# Patient Record
Sex: MALE | Race: WHITE | NOT HISPANIC OR LATINO
[De-identification: names, ages, dates, MRNs, and addresses within clinical notes are randomized per-mention and may not be internally consistent; named-entity substitution may affect disease eponyms.]

---

## 2021-09-08 ENCOUNTER — APPOINTMENT (OUTPATIENT)
Dept: INTERNAL MEDICINE | Facility: CLINIC | Age: 81
End: 2021-09-08
Payer: MEDICARE

## 2021-09-08 VITALS — HEIGHT: 67 IN | WEIGHT: 190 LBS | BODY MASS INDEX: 29.82 KG/M2

## 2021-09-08 VITALS — SYSTOLIC BLOOD PRESSURE: 122 MMHG | RESPIRATION RATE: 14 BRPM | HEART RATE: 72 BPM | DIASTOLIC BLOOD PRESSURE: 76 MMHG

## 2021-09-08 DIAGNOSIS — F32.9 ANXIETY DISORDER, UNSPECIFIED: ICD-10-CM

## 2021-09-08 DIAGNOSIS — Z87.820 PERSONAL HISTORY OF TRAUMATIC BRAIN INJURY: ICD-10-CM

## 2021-09-08 DIAGNOSIS — Z98.61 CORONARY ANGIOPLASTY STATUS: ICD-10-CM

## 2021-09-08 DIAGNOSIS — I25.2 OLD MYOCARDIAL INFARCTION: ICD-10-CM

## 2021-09-08 DIAGNOSIS — K59.09 OTHER CONSTIPATION: ICD-10-CM

## 2021-09-08 DIAGNOSIS — F41.9 ANXIETY DISORDER, UNSPECIFIED: ICD-10-CM

## 2021-09-08 PROBLEM — Z00.00 ENCOUNTER FOR PREVENTIVE HEALTH EXAMINATION: Status: ACTIVE | Noted: 2021-09-08

## 2021-09-08 PROCEDURE — 99204 OFFICE O/P NEW MOD 45 MIN: CPT | Mod: 25

## 2021-09-08 PROCEDURE — 36415 COLL VENOUS BLD VENIPUNCTURE: CPT

## 2021-09-08 RX ORDER — ASPIRIN ENTERIC COATED TABLETS 81 MG 81 MG/1
81 TABLET, DELAYED RELEASE ORAL DAILY
Qty: 30 | Refills: 2 | Status: ACTIVE | COMMUNITY
Start: 2021-09-08

## 2021-09-10 LAB
25(OH)D3 SERPL-MCNC: 26.3 NG/ML
ALBUMIN SERPL ELPH-MCNC: 4.4 G/DL
ALP BLD-CCNC: 79 U/L
ALT SERPL-CCNC: 12 U/L
ANION GAP SERPL CALC-SCNC: 13 MMOL/L
APPEARANCE: CLEAR
AST SERPL-CCNC: 17 U/L
BASOPHILS # BLD AUTO: 0.03 K/UL
BASOPHILS NFR BLD AUTO: 0.7 %
BILIRUB SERPL-MCNC: 0.8 MG/DL
BILIRUBIN URINE: NEGATIVE
BLOOD URINE: NEGATIVE
BUN SERPL-MCNC: 23 MG/DL
CALCIUM SERPL-MCNC: 9.3 MG/DL
CHLORIDE SERPL-SCNC: 106 MMOL/L
CHOLEST SERPL-MCNC: 168 MG/DL
CO2 SERPL-SCNC: 24 MMOL/L
COLOR: YELLOW
COVID-19 SPIKE DOMAIN ANTIBODY INTERPRETATION: POSITIVE
CREAT SERPL-MCNC: 0.93 MG/DL
CREAT SPEC-SCNC: 164 MG/DL
EOSINOPHIL # BLD AUTO: 0.2 K/UL
EOSINOPHIL NFR BLD AUTO: 4.4 %
ESTIMATED AVERAGE GLUCOSE: 100 MG/DL
FOLATE SERPL-MCNC: 8.6 NG/ML
GLUCOSE QUALITATIVE U: NEGATIVE
GLUCOSE SERPL-MCNC: 100 MG/DL
HBA1C MFR BLD HPLC: 5.1 %
HCT VFR BLD CALC: 44 %
HDLC SERPL-MCNC: 41 MG/DL
HGB BLD-MCNC: 15.5 G/DL
IMM GRANULOCYTES NFR BLD AUTO: 0.2 %
KETONES URINE: NEGATIVE
LDLC SERPL CALC-MCNC: 107 MG/DL
LEUKOCYTE ESTERASE URINE: NEGATIVE
LYMPHOCYTES # BLD AUTO: 1.37 K/UL
LYMPHOCYTES NFR BLD AUTO: 29.8 %
MAGNESIUM SERPL-MCNC: 2.1 MG/DL
MAN DIFF?: NORMAL
MCHC RBC-ENTMCNC: 32.4 PG
MCHC RBC-ENTMCNC: 35.2 GM/DL
MCV RBC AUTO: 91.9 FL
MICROALBUMIN 24H UR DL<=1MG/L-MCNC: <1.2 MG/DL
MICROALBUMIN/CREAT 24H UR-RTO: NORMAL MG/G
MONOCYTES # BLD AUTO: 0.42 K/UL
MONOCYTES NFR BLD AUTO: 9.2 %
NEUTROPHILS # BLD AUTO: 2.56 K/UL
NEUTROPHILS NFR BLD AUTO: 55.7 %
NITRITE URINE: NEGATIVE
NONHDLC SERPL-MCNC: 128 MG/DL
PH URINE: 6
PLATELET # BLD AUTO: 177 K/UL
POTASSIUM SERPL-SCNC: 4.6 MMOL/L
PROT SERPL-MCNC: 6.8 G/DL
PROTEIN URINE: NORMAL
PSA SERPL-MCNC: 1.05 NG/ML
RBC # BLD: 4.79 M/UL
RBC # FLD: 12.4 %
SARS-COV-2 AB SERPL IA-ACNC: 97.2 U/ML
SODIUM SERPL-SCNC: 143 MMOL/L
SPECIFIC GRAVITY URINE: >=1.03
T4 FREE SERPL-MCNC: 1.1 NG/DL
T4 SERPL-MCNC: 6.7 UG/DL
TRIGL SERPL-MCNC: 104 MG/DL
TSH SERPL-ACNC: 2.47 UIU/ML
URATE SERPL-MCNC: 4.6 MG/DL
UROBILINOGEN URINE: NORMAL
VIT B12 SERPL-MCNC: 250 PG/ML
WBC # FLD AUTO: 4.59 K/UL

## 2021-09-12 NOTE — ASSESSMENT
[FreeTextEntry1] : Blood work was drawn and sent to the lab today. The patient has been instructed to call the office next week to discuss today's lab work.\par \par Obtain old records\par \par Begin Lexapro 5 mg daily\par Consider therapist / psychologist\par \par Celebrex PRN for shoulder pain\par Orthopedic evaluation\par \par Consider Neurology evaluation for neuropathy / ?early cognitive issues\par \par Cardiology evaluation due\par \par Dermatology eval due\par \par f/u one month
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
## 2021-09-13 ENCOUNTER — APPOINTMENT (OUTPATIENT)
Dept: INTERNAL MEDICINE | Facility: CLINIC | Age: 81
End: 2021-09-13

## 2021-09-14 ENCOUNTER — LABORATORY RESULT (OUTPATIENT)
Age: 81
End: 2021-09-14

## 2021-09-29 ENCOUNTER — APPOINTMENT (OUTPATIENT)
Dept: INTERNAL MEDICINE | Facility: CLINIC | Age: 81
End: 2021-09-29
Payer: MEDICARE

## 2021-09-29 ENCOUNTER — APPOINTMENT (OUTPATIENT)
Dept: ORTHOPEDIC SURGERY | Facility: CLINIC | Age: 81
End: 2021-09-29
Payer: MEDICARE

## 2021-09-29 VITALS
BODY MASS INDEX: 29.82 KG/M2 | DIASTOLIC BLOOD PRESSURE: 80 MMHG | SYSTOLIC BLOOD PRESSURE: 140 MMHG | WEIGHT: 190 LBS | HEIGHT: 67 IN | HEART RATE: 66 BPM | RESPIRATION RATE: 14 BRPM

## 2021-09-29 VITALS
BODY MASS INDEX: 29.19 KG/M2 | DIASTOLIC BLOOD PRESSURE: 79 MMHG | HEART RATE: 60 BPM | SYSTOLIC BLOOD PRESSURE: 138 MMHG | WEIGHT: 186 LBS | HEIGHT: 67 IN

## 2021-09-29 DIAGNOSIS — Z23 ENCOUNTER FOR IMMUNIZATION: ICD-10-CM

## 2021-09-29 PROCEDURE — 73030 X-RAY EXAM OF SHOULDER: CPT | Mod: 50

## 2021-09-29 PROCEDURE — 99214 OFFICE O/P EST MOD 30 MIN: CPT | Mod: 25

## 2021-09-29 PROCEDURE — G0008: CPT

## 2021-09-29 PROCEDURE — 90662 IIV NO PRSV INCREASED AG IM: CPT

## 2021-09-29 PROCEDURE — 99203 OFFICE O/P NEW LOW 30 MIN: CPT

## 2021-09-29 RX ORDER — ESCITALOPRAM OXALATE 5 MG/1
5 TABLET ORAL DAILY
Qty: 30 | Refills: 1 | Status: DISCONTINUED | COMMUNITY
Start: 2021-09-08 | End: 2021-09-29

## 2021-09-29 RX ORDER — CELECOXIB 100 MG/1
100 CAPSULE ORAL
Qty: 60 | Refills: 1 | Status: DISCONTINUED | COMMUNITY
Start: 2021-09-08 | End: 2021-09-29

## 2021-09-30 NOTE — HISTORY OF PRESENT ILLNESS
[de-identified] : Pt presents for evaluation of HTN, CAD, DM, and neuropathy.\par He has been on lexapro with mild improvement in mood - no side effects of medication.\par Saw orthopedics -

## 2021-09-30 NOTE — ASSESSMENT
[FreeTextEntry1] : f.u with neurology\par may need PT - declined today\par meloxicam as needed\par \par Flu vaccine given\par \par Increase lexapro 10mg daily\par \par cardiology evaluation next month \par awaiting old records\par \par F/U 4-6 weeks for CC

## 2021-10-04 ENCOUNTER — NON-APPOINTMENT (OUTPATIENT)
Age: 81
End: 2021-10-04

## 2021-10-15 ENCOUNTER — APPOINTMENT (OUTPATIENT)
Dept: CARDIOLOGY | Facility: CLINIC | Age: 81
End: 2021-10-15
Payer: MEDICARE

## 2021-10-15 ENCOUNTER — NON-APPOINTMENT (OUTPATIENT)
Age: 81
End: 2021-10-15

## 2021-10-15 VITALS
SYSTOLIC BLOOD PRESSURE: 148 MMHG | BODY MASS INDEX: 28.88 KG/M2 | HEART RATE: 98 BPM | DIASTOLIC BLOOD PRESSURE: 80 MMHG | HEIGHT: 67 IN | OXYGEN SATURATION: 96 % | WEIGHT: 184 LBS

## 2021-10-15 VITALS — DIASTOLIC BLOOD PRESSURE: 80 MMHG | SYSTOLIC BLOOD PRESSURE: 140 MMHG

## 2021-10-15 DIAGNOSIS — I20.9 ANGINA PECTORIS, UNSPECIFIED: ICD-10-CM

## 2021-10-15 DIAGNOSIS — Z87.891 PERSONAL HISTORY OF NICOTINE DEPENDENCE: ICD-10-CM

## 2021-10-15 PROCEDURE — 99205 OFFICE O/P NEW HI 60 MIN: CPT

## 2021-10-15 PROCEDURE — 93000 ELECTROCARDIOGRAM COMPLETE: CPT

## 2021-10-15 NOTE — REVIEW OF SYSTEMS
[Feeling Fatigued] : feeling fatigued [Blurry Vision] : blurred vision [Dyspnea on exertion] : dyspnea during exertion [Chest Discomfort] : chest discomfort [Lower Ext Edema] : no extremity edema [Palpitations] : no palpitations [Syncope] : no syncope [Cough] : no cough [Constipation] : constipation [Urinary Frequency] : urinary frequency [Joint Pain] : joint pain [Rash] : no rash [Dizziness] : dizziness [Memory Lapses Or Loss] : memory lapses or loss [Easy Bruising] : a tendency for easy bruising

## 2021-10-15 NOTE — DISCUSSION/SUMMARY
[FreeTextEntry1] : He is a 81-year-old with a history of coronary artery disease status post coronary stenting and myocardial infarction's years ago who now presents with consistent anginal symptoms.  He has no rest chest pain.\par No acute ECG changes are seen, however his blood pressure control is not optimal.\par I have suggested that we exchange amlodipine 5 mg daily for his isosorbide as a better antianginal and antihypertensive medication.\par His metoprolol dose is somewhat lower and I will consider uptitrating this as time goes on.\par Continue lisinopril for presumed LV dysfunction, though I have scheduled him for an echocardiogram to define the extent of his LV dysfunction.\par His exertional angina is worrisome and I have suggested that he undergo nuclear stress testing pharmacologically given his inability to walk on a treadmill.  Further recommendations will be made about reviewing his medications once I have seen all of these test results.\par In the meantime, statin therapy is certainly indicated and I have started him on Crestor 10 mg daily.\par I reviewed all this with his wife.

## 2021-10-15 NOTE — PHYSICAL EXAM
[Normal Venous Pressure] : normal venous pressure [Normal S1, S2] : normal S1, S2 [Clear Lung Fields] : clear lung fields [Good Air Entry] : good air entry [Soft] : abdomen soft [Normal Bowel Sounds] : normal bowel sounds [No Edema] : no edema [No Rash] : no rash [Moves all extremities] : moves all extremities [de-identified] : Older gentleman in no acute distress [de-identified] : No murmurs are heard [de-identified] : A shuffling unsteady gait is noted [de-identified] : Some reduced memory and increased access time.

## 2021-10-15 NOTE — HISTORY OF PRESENT ILLNESS
[FreeTextEntry1] : Dear [place],\lauren Thank you for referring him for cardiovascular evaluation and management.  He is a 81-year-old with a history of coronary artery disease status post coronary stenting as well as myocardial infarctions over 10 years ago.  He is accompanied by his wife.  Mr. Esteves reports some memory deficits and difficulty walking of late including poor balance as well as significant hand and shoulder pains.\par He is unclear regarding the extent of his LV dysfunction, if any.\par He denies any recent cardiovascular work-up.\par He is taking his medications as directed.\par He has a history of marijuana smoking for many years and diabetes mellitus which seems to have resolved.\par He has no significant complaint is exertional chest tightness that is somewhat reminiscent of his anginal pain when he walks his dog.\par This happens after about a quarter of a mile and resolve spontaneously with rest.\par

## 2021-11-01 ENCOUNTER — RX RENEWAL (OUTPATIENT)
Age: 81
End: 2021-11-01

## 2021-11-23 ENCOUNTER — APPOINTMENT (OUTPATIENT)
Dept: CARDIOLOGY | Facility: CLINIC | Age: 81
End: 2021-11-23
Payer: MEDICARE

## 2021-11-23 PROCEDURE — 93306 TTE W/DOPPLER COMPLETE: CPT

## 2021-11-29 ENCOUNTER — APPOINTMENT (OUTPATIENT)
Dept: ORTHOPEDIC SURGERY | Facility: CLINIC | Age: 81
End: 2021-11-29
Payer: MEDICARE

## 2021-11-29 DIAGNOSIS — M19.012 PRIMARY OSTEOARTHRITIS, RIGHT SHOULDER: ICD-10-CM

## 2021-11-29 DIAGNOSIS — M19.011 PRIMARY OSTEOARTHRITIS, RIGHT SHOULDER: ICD-10-CM

## 2021-11-29 PROCEDURE — 99213 OFFICE O/P EST LOW 20 MIN: CPT | Mod: 25

## 2021-11-29 PROCEDURE — 20610 DRAIN/INJ JOINT/BURSA W/O US: CPT | Mod: LT

## 2021-12-06 ENCOUNTER — APPOINTMENT (OUTPATIENT)
Dept: CARDIOLOGY | Facility: CLINIC | Age: 81
End: 2021-12-06
Payer: MEDICARE

## 2021-12-06 PROCEDURE — 78452 HT MUSCLE IMAGE SPECT MULT: CPT

## 2021-12-06 PROCEDURE — 93015 CV STRESS TEST SUPVJ I&R: CPT

## 2021-12-06 PROCEDURE — A9500: CPT

## 2021-12-13 ENCOUNTER — APPOINTMENT (OUTPATIENT)
Dept: CARDIOLOGY | Facility: CLINIC | Age: 81
End: 2021-12-13

## 2021-12-28 ENCOUNTER — APPOINTMENT (OUTPATIENT)
Dept: INTERNAL MEDICINE | Facility: CLINIC | Age: 81
End: 2021-12-28
Payer: MEDICARE

## 2021-12-28 PROCEDURE — 86580 TB INTRADERMAL TEST: CPT

## 2021-12-29 ENCOUNTER — APPOINTMENT (OUTPATIENT)
Dept: INTERNAL MEDICINE | Facility: CLINIC | Age: 81
End: 2021-12-29
Payer: MEDICARE

## 2021-12-29 ENCOUNTER — APPOINTMENT (OUTPATIENT)
Dept: CARDIOLOGY | Facility: CLINIC | Age: 81
End: 2021-12-29

## 2021-12-29 VITALS — SYSTOLIC BLOOD PRESSURE: 132 MMHG | RESPIRATION RATE: 14 BRPM | HEART RATE: 84 BPM | DIASTOLIC BLOOD PRESSURE: 80 MMHG

## 2021-12-29 VITALS — WEIGHT: 190 LBS | HEIGHT: 67 IN | BODY MASS INDEX: 29.82 KG/M2

## 2021-12-29 DIAGNOSIS — G62.9 POLYNEUROPATHY, UNSPECIFIED: ICD-10-CM

## 2021-12-29 DIAGNOSIS — Z23 ENCOUNTER FOR IMMUNIZATION: ICD-10-CM

## 2021-12-29 PROCEDURE — 86580 TB INTRADERMAL TEST: CPT

## 2021-12-29 PROCEDURE — 99214 OFFICE O/P EST MOD 30 MIN: CPT

## 2021-12-29 RX ORDER — ISOSORBIDE MONONITRATE 30 MG/1
30 TABLET, EXTENDED RELEASE ORAL DAILY
Refills: 0 | Status: DISCONTINUED | COMMUNITY
Start: 2021-09-08 | End: 2021-12-29

## 2021-12-30 ENCOUNTER — RX RENEWAL (OUTPATIENT)
Age: 81
End: 2021-12-30

## 2021-12-30 PROBLEM — G62.9 NEUROPATHY: Status: ACTIVE | Noted: 2021-09-08

## 2021-12-30 NOTE — HISTORY OF PRESENT ILLNESS
[de-identified] : Pt presents for evaluation of HTN, CAD, DM, and neuropathy.\par He has been on lexapro with mild improvement in mood - no side effects of medication.\par Saw orthopedics -

## 2022-01-03 RX ORDER — ROSUVASTATIN CALCIUM 10 MG/1
10 TABLET, FILM COATED ORAL DAILY
Qty: 90 | Refills: 0 | Status: ACTIVE | COMMUNITY
Start: 2021-10-15 | End: 1900-01-01

## 2022-01-27 ENCOUNTER — NON-APPOINTMENT (OUTPATIENT)
Age: 82
End: 2022-01-27

## 2022-02-24 ENCOUNTER — RX RENEWAL (OUTPATIENT)
Age: 82
End: 2022-02-24

## 2022-02-24 RX ORDER — MELOXICAM 15 MG/1
15 TABLET ORAL DAILY
Qty: 30 | Refills: 0 | Status: ACTIVE | COMMUNITY
Start: 2021-09-29 | End: 1900-01-01

## 2022-04-21 ENCOUNTER — RX RENEWAL (OUTPATIENT)
Age: 82
End: 2022-04-21

## 2022-07-19 ENCOUNTER — NON-APPOINTMENT (OUTPATIENT)
Age: 82
End: 2022-07-19

## 2022-07-19 ENCOUNTER — APPOINTMENT (OUTPATIENT)
Dept: CARDIOLOGY | Facility: CLINIC | Age: 82
End: 2022-07-19

## 2022-07-19 VITALS
WEIGHT: 215 LBS | HEART RATE: 87 BPM | DIASTOLIC BLOOD PRESSURE: 80 MMHG | OXYGEN SATURATION: 94 % | HEIGHT: 67 IN | SYSTOLIC BLOOD PRESSURE: 120 MMHG | BODY MASS INDEX: 33.74 KG/M2

## 2022-07-19 PROCEDURE — 99214 OFFICE O/P EST MOD 30 MIN: CPT | Mod: 25

## 2022-07-19 PROCEDURE — 93000 ELECTROCARDIOGRAM COMPLETE: CPT

## 2022-07-19 RX ORDER — METOPROLOL SUCCINATE 25 MG/1
25 TABLET, EXTENDED RELEASE ORAL
Qty: 90 | Refills: 1 | Status: ACTIVE | OUTPATIENT
Start: 2021-09-08

## 2022-07-19 RX ORDER — CLOPIDOGREL BISULFATE 75 MG/1
75 TABLET, FILM COATED ORAL
Qty: 30 | Refills: 0 | Status: ACTIVE | COMMUNITY
Start: 2022-07-13

## 2022-07-19 NOTE — HISTORY OF PRESENT ILLNESS
[FreeTextEntry1] : Ate an MJ laced cookie one day prior. dizzy, slurred words. Went to ER. No new issues on workup there.\par Some bradycardia at night. Metoprolol stopped.\par No new symptoms. now.\par \par \par Prior:\par Dear [place],\par Thank you for referring him for cardiovascular evaluation and management.  He is a 81-year-old with a history of coronary artery disease status post coronary stenting as well as myocardial infarctions over 10 years ago.  He is accompanied by his wife.  Mr. Esteves reports some memory deficits and difficulty walking of late including poor balance as well as significant hand and shoulder pains.\par He is unclear regarding the extent of his LV dysfunction, if any.\par He denies any recent cardiovascular work-up.\par He is taking his medications as directed.\par He has a history of marijuana smoking for many years and diabetes mellitus which seems to have resolved.\par He has no significant complaint is exertional chest tightness that is somewhat reminiscent of his anginal pain when he walks his dog.\par This happens after about a quarter of a mile and resolve spontaneously with rest.\par

## 2022-07-19 NOTE — PHYSICAL EXAM
[Normal Venous Pressure] : normal venous pressure [Normal S1, S2] : normal S1, S2 [Clear Lung Fields] : clear lung fields [Good Air Entry] : good air entry [Soft] : abdomen soft [Normal Bowel Sounds] : normal bowel sounds [No Edema] : no edema [No Rash] : no rash [Moves all extremities] : moves all extremities [de-identified] : Older gentleman in no acute distress [de-identified] : No murmurs are heard [de-identified] : A shuffling unsteady gait is noted [de-identified] : Some reduced memory and increased access time.

## 2022-07-19 NOTE — DISCUSSION/SUMMARY
[FreeTextEntry1] : He is a 82-year-old with a history of coronary artery disease status post coronary stenting and myocardial infarction's years ago who now presents with consistent anginal symptoms.   \par No acute ECG changes are seen, however his HR and blood pressure control are not optimal.\par Restart Metoprolol 25 mg qd and continue amlodipine 5 mg daily.\par CAD/LV dysfunction: Continue lisinopril for presumed LV dysfunction. Continue Crestor 10 mg daily.\par TIA symptoms likely related to cookie laced with Marajuana.\par I reviewed all this with his wife.

## 2022-07-20 ENCOUNTER — NON-APPOINTMENT (OUTPATIENT)
Age: 82
End: 2022-07-20

## 2022-09-27 ENCOUNTER — RX RENEWAL (OUTPATIENT)
Age: 82
End: 2022-09-27

## 2022-09-27 RX ORDER — LISINOPRIL 5 MG/1
5 TABLET ORAL
Qty: 1 | Refills: 2 | Status: ACTIVE | COMMUNITY
Start: 2021-09-08 | End: 1900-01-01

## 2023-01-17 ENCOUNTER — APPOINTMENT (OUTPATIENT)
Dept: CARDIOLOGY | Facility: CLINIC | Age: 83
End: 2023-01-17
Payer: MEDICARE

## 2023-01-17 VITALS
WEIGHT: 218 LBS | OXYGEN SATURATION: 96 % | HEIGHT: 67 IN | BODY MASS INDEX: 34.21 KG/M2 | HEART RATE: 62 BPM | DIASTOLIC BLOOD PRESSURE: 78 MMHG | RESPIRATION RATE: 16 BRPM | SYSTOLIC BLOOD PRESSURE: 122 MMHG

## 2023-01-17 DIAGNOSIS — G45.9 TRANSIENT CEREBRAL ISCHEMIC ATTACK, UNSPECIFIED: ICD-10-CM

## 2023-01-17 PROCEDURE — 99214 OFFICE O/P EST MOD 30 MIN: CPT | Mod: 25

## 2023-01-17 PROCEDURE — 93000 ELECTROCARDIOGRAM COMPLETE: CPT

## 2023-01-17 NOTE — DISCUSSION/SUMMARY
[FreeTextEntry1] : He is a 82-year-old with a history of coronary artery disease status post coronary stenting and myocardial infarction's years ago who now presents with stable symptoms but weight gain.\par No acute ECG changes are seen.\par HTN: Optimized. Continue amlodipine 5 mg daily, continue metoprolol and lisinopril.\par CAD: continue crestor. \par Exercise and lose weight.\par I reviewed all this with his wife. [EKG obtained to assist in diagnosis and management of assessed problem(s)] : EKG obtained to assist in diagnosis and management of assessed problem(s)

## 2023-01-17 NOTE — HISTORY OF PRESENT ILLNESS
[FreeTextEntry1] : Accompanied by his wife.\par He has been feeling okay, notes some occasional wheezing. \par Not much exercise. No change in medications.\par \par pRIOR:\par Dear [place],\par Thank you for referring him for cardiovascular evaluation and management.  He is a 81-year-old with a history of coronary artery disease status post coronary stenting as well as myocardial infarctions over 10 years ago.  He is accompanied by his wife.  Mr. Esteves reports some memory deficits and difficulty walking of late including poor balance as well as significant hand and shoulder pains.\par He is unclear regarding the extent of his LV dysfunction, if any.\par He denies any recent cardiovascular work-up.\par He is taking his medications as directed.\par He has a history of marijuana smoking for many years and diabetes mellitus which seems to have resolved.\par He has no significant complaint is exertional chest tightness that is somewhat reminiscent of his anginal pain when he walks his dog.\par This happens after about a quarter of a mile and resolve spontaneously with rest.\par

## 2023-01-17 NOTE — PHYSICAL EXAM
[Normal Venous Pressure] : normal venous pressure [Normal S1, S2] : normal S1, S2 [Clear Lung Fields] : clear lung fields [Good Air Entry] : good air entry [Soft] : abdomen soft [Normal Bowel Sounds] : normal bowel sounds [No Edema] : no edema [No Rash] : no rash [Moves all extremities] : moves all extremities [de-identified] : Older gentleman in no acute distress [de-identified] : No murmurs are heard [de-identified] : A shuffling unsteady gait is noted [de-identified] : Some reduced memory and increased access time.

## 2023-02-02 ENCOUNTER — RX RENEWAL (OUTPATIENT)
Age: 83
End: 2023-02-02

## 2023-02-02 RX ORDER — AMLODIPINE BESYLATE 5 MG/1
5 TABLET ORAL DAILY
Qty: 90 | Refills: 0 | Status: ACTIVE | COMMUNITY
Start: 2021-10-15 | End: 1900-01-01

## 2023-02-28 ENCOUNTER — OFFICE (OUTPATIENT)
Dept: URBAN - METROPOLITAN AREA CLINIC 34 | Facility: CLINIC | Age: 83
Setting detail: OPHTHALMOLOGY
End: 2023-02-28
Payer: MEDICARE

## 2023-02-28 DIAGNOSIS — H35.40: ICD-10-CM

## 2023-02-28 DIAGNOSIS — E11.9: ICD-10-CM

## 2023-02-28 DIAGNOSIS — H25.13: ICD-10-CM

## 2023-02-28 DIAGNOSIS — H43.811: ICD-10-CM

## 2023-02-28 DIAGNOSIS — H01.001: ICD-10-CM

## 2023-02-28 DIAGNOSIS — H01.004: ICD-10-CM

## 2023-02-28 PROCEDURE — 92004 COMPRE OPH EXAM NEW PT 1/>: CPT | Performed by: OPHTHALMOLOGY

## 2023-02-28 ASSESSMENT — LID EXAM ASSESSMENTS
OD_BLEPHARITIS: T
OS_BLEPHARITIS: T

## 2023-02-28 ASSESSMENT — REFRACTION_AUTOREFRACTION
OD_AXIS: 159
OD_CYLINDER: +0.75
OS_CYLINDER: +1.50
OS_AXIS: 170
OS_SPHERE: -1.25
OD_SPHERE: -0.50

## 2023-02-28 ASSESSMENT — REFRACTION_CURRENTRX
OD_CYLINDER: SPH
OS_OVR_VA: 20/
OD_OVR_VA: 20/
OD_ADD: +2.50
OS_SPHERE: +1.00
OS_VPRISM_DIRECTION: BF
OD_VPRISM_DIRECTION: BF
OS_CYLINDER: SPH
OD_SPHERE: +1.00
OS_ADD: +2.50

## 2023-02-28 ASSESSMENT — SPHEQUIV_DERIVED
OD_SPHEQUIV: -0.125
OS_SPHEQUIV: -0.5

## 2023-02-28 ASSESSMENT — VISUAL ACUITY
OD_BCVA: 20/40-1
OS_BCVA: 20/30-1

## 2023-02-28 ASSESSMENT — CONFRONTATIONAL VISUAL FIELD TEST (CVF)
OS_FINDINGS: FULL
OD_FINDINGS: FULL

## 2023-03-07 ENCOUNTER — OFFICE (OUTPATIENT)
Dept: URBAN - METROPOLITAN AREA CLINIC 34 | Facility: CLINIC | Age: 83
Setting detail: OPHTHALMOLOGY
End: 2023-03-07
Payer: MEDICARE

## 2023-03-07 DIAGNOSIS — H52.223: ICD-10-CM

## 2023-03-07 DIAGNOSIS — H52.4: ICD-10-CM

## 2023-03-07 PROBLEM — H43.811 POSTERIOR VITREOUS DETACHMENT; RIGHT EYE: Status: ACTIVE | Noted: 2023-02-28

## 2023-03-07 PROBLEM — H01.004 BLEPHARITIS; RIGHT UPPER LID, LEFT UPPER LID: Status: ACTIVE | Noted: 2023-02-28

## 2023-03-07 PROBLEM — E11.9 DIABETES TYPE 2 NO RETINOPATHY ; BOTH EYES: Status: ACTIVE | Noted: 2023-02-28

## 2023-03-07 PROBLEM — H01.001 BLEPHARITIS; RIGHT UPPER LID, LEFT UPPER LID: Status: ACTIVE | Noted: 2023-02-28

## 2023-03-07 PROBLEM — H35.40 PERIPAPILLARY ATROPHY ; LEFT EYE: Status: ACTIVE | Noted: 2023-02-28

## 2023-03-07 PROBLEM — H25.13 CATARACT SENILE NUCLEAR SCLEROSIS; BOTH EYES: Status: ACTIVE | Noted: 2023-02-28

## 2023-03-07 PROCEDURE — 92015 DETERMINE REFRACTIVE STATE: CPT | Performed by: OPHTHALMOLOGY

## 2023-03-07 PROCEDURE — RX/CHECK RX/CHECK: Performed by: OPHTHALMOLOGY

## 2023-03-07 ASSESSMENT — REFRACTION_MANIFEST
OD_AXIS: 120
OS_AXIS: 170
OS_CYLINDER: +1.00
OS_VA1: 20/25-1
OD_SPHERE: PLANO
OD_VA1: 20/25
OS_SPHERE: -0.75
OS_VA1: 20/20
OS_CYLINDER: +1.75
OD_VA1: 20/25
OD_CYLINDER: +0.75
OD_CYLINDER: +0.75
OD_SPHERE: -0.50
OS_AXIS: 175
OS_SPHERE: -1.50
OS_ADD: +2.50
OD_AXIS: 165
OD_ADD: +2.50

## 2023-03-07 ASSESSMENT — REFRACTION_AUTOREFRACTION
OD_SPHERE: -0.50
OS_SPHERE: -1.50
OD_AXIS: 164
OD_CYLINDER: +0.75
OS_AXIS: 168
OS_CYLINDER: +1.75

## 2023-03-07 ASSESSMENT — REFRACTION_CURRENTRX
OS_VPRISM_DIRECTION: BF
OS_CYLINDER: SPH
OD_SPHERE: +1.00
OS_OVR_VA: 20/
OS_ADD: +2.50
OS_SPHERE: +1.00
OD_OVR_VA: 20/
OD_CYLINDER: SPH
OD_VPRISM_DIRECTION: BF
OD_ADD: +2.50

## 2023-03-07 ASSESSMENT — SPHEQUIV_DERIVED
OS_SPHEQUIV: -0.625
OD_SPHEQUIV: -0.125
OS_SPHEQUIV: -0.625
OD_SPHEQUIV: -0.125
OS_SPHEQUIV: -0.25

## 2023-03-07 ASSESSMENT — AXIALLENGTH_DERIVED
OS_AL: 23.7446
OD_AL: 23.5489
OS_AL: 23.5975
OD_AL: 23.5489
OS_AL: 23.7446

## 2023-03-07 ASSESSMENT — KERATOMETRY
OS_AXISANGLE_DEGREES: 094
OD_K1POWER_DIOPTERS: 43.75
OD_AXISANGLE_DEGREES: 000
OS_K2POWER_DIOPTERS: 43.75
OS_K1POWER_DIOPTERS: 43.75
OD_K2POWER_DIOPTERS: 43.75

## 2023-03-07 ASSESSMENT — CONFRONTATIONAL VISUAL FIELD TEST (CVF)
OD_FINDINGS: FULL
OS_FINDINGS: FULL

## 2023-03-07 ASSESSMENT — VISUAL ACUITY
OD_BCVA: 20/25
OS_BCVA: 20/20

## 2023-07-20 ENCOUNTER — APPOINTMENT (OUTPATIENT)
Dept: CARDIOLOGY | Facility: CLINIC | Age: 83
End: 2023-07-20
Payer: MEDICARE

## 2023-07-20 ENCOUNTER — NON-APPOINTMENT (OUTPATIENT)
Age: 83
End: 2023-07-20

## 2023-07-20 VITALS
DIASTOLIC BLOOD PRESSURE: 78 MMHG | HEIGHT: 67 IN | HEART RATE: 63 BPM | OXYGEN SATURATION: 95 % | WEIGHT: 221 LBS | SYSTOLIC BLOOD PRESSURE: 132 MMHG | BODY MASS INDEX: 34.61 KG/M2

## 2023-07-20 PROCEDURE — 99214 OFFICE O/P EST MOD 30 MIN: CPT | Mod: 25

## 2023-07-20 PROCEDURE — 93000 ELECTROCARDIOGRAM COMPLETE: CPT

## 2023-07-20 RX ORDER — ESCITALOPRAM OXALATE 10 MG/1
10 TABLET ORAL
Qty: 30 | Refills: 1 | Status: DISCONTINUED | COMMUNITY
Start: 2021-09-29 | End: 2023-07-20

## 2023-07-20 NOTE — PHYSICAL EXAM
[Normal Venous Pressure] : normal venous pressure [Normal S1, S2] : normal S1, S2 [Clear Lung Fields] : clear lung fields [Good Air Entry] : good air entry [Soft] : abdomen soft [Normal Bowel Sounds] : normal bowel sounds [No Edema] : no edema [No Rash] : no rash [Moves all extremities] : moves all extremities [de-identified] : Older gentleman in no acute distress [de-identified] : No murmurs are heard [de-identified] : A shuffling unsteady gait is noted [de-identified] : Some reduced memory and increased access time.

## 2023-07-20 NOTE — HISTORY OF PRESENT ILLNESS
[FreeTextEntry1] : Accompanied by his wife.\par nerve pain in his feet and poor balance are his biggest concern.\par No orthopnea or PND.\par Walking is limited by neuropathy.\par \par \par Prior:\par Accompanied by his wife.\par He has been feeling okay, notes some occasional wheezing. \par Not much exercise. No change in medications.\par \par pRIOR:\par Dear [place],\par Thank you for referring him for cardiovascular evaluation and management.  He is a 81-year-old with a history of coronary artery disease status post coronary stenting as well as myocardial infarctions over 10 years ago.  He is accompanied by his wife.  Mr. Esteves reports some memory deficits and difficulty walking of late including poor balance as well as significant hand and shoulder pains.\par He is unclear regarding the extent of his LV dysfunction, if any.\par He denies any recent cardiovascular work-up.\par He is taking his medications as directed.\par He has a history of marijuana smoking for many years and diabetes mellitus which seems to have resolved.\par He has no significant complaint is exertional chest tightness that is somewhat reminiscent of his anginal pain when he walks his dog.\par This happens after about a quarter of a mile and resolve spontaneously with rest.\par

## 2023-07-20 NOTE — DISCUSSION/SUMMARY
[FreeTextEntry1] : He is a 83-year-old with a history of coronary artery disease status post coronary stenting and myocardial infarction's years ago who now presents with stable symptoms but weight gain.\par No acute ECG changes are seen.\par HTN: Optimized now. Continue amlodipine 5 mg daily, continue metoprolol and lisinopril.\par CAD: continue crestor. \par Weight has increased and I suggested that he Exercise more, possibly on a stationary bicycle and lose weight.\par I reviewed all this with his wife.\par semiannual followup. [EKG obtained to assist in diagnosis and management of assessed problem(s)] : EKG obtained to assist in diagnosis and management of assessed problem(s)

## 2024-02-07 ENCOUNTER — APPOINTMENT (OUTPATIENT)
Dept: CARDIOLOGY | Facility: CLINIC | Age: 84
End: 2024-02-07
Payer: MEDICARE

## 2024-02-07 ENCOUNTER — NON-APPOINTMENT (OUTPATIENT)
Age: 84
End: 2024-02-07

## 2024-02-07 VITALS
DIASTOLIC BLOOD PRESSURE: 70 MMHG | WEIGHT: 226 LBS | SYSTOLIC BLOOD PRESSURE: 100 MMHG | OXYGEN SATURATION: 94 % | HEART RATE: 69 BPM | BODY MASS INDEX: 35.4 KG/M2

## 2024-02-07 DIAGNOSIS — I10 ESSENTIAL (PRIMARY) HYPERTENSION: ICD-10-CM

## 2024-02-07 DIAGNOSIS — R06.09 OTHER FORMS OF DYSPNEA: ICD-10-CM

## 2024-02-07 DIAGNOSIS — E11.21 TYPE 2 DIABETES MELLITUS WITH DIABETIC NEPHROPATHY: ICD-10-CM

## 2024-02-07 DIAGNOSIS — I25.10 ATHEROSCLEROTIC HEART DISEASE OF NATIVE CORONARY ARTERY W/OUT ANGINA PECTORIS: ICD-10-CM

## 2024-02-07 PROCEDURE — 93000 ELECTROCARDIOGRAM COMPLETE: CPT

## 2024-02-07 PROCEDURE — G2211 COMPLEX E/M VISIT ADD ON: CPT

## 2024-02-07 PROCEDURE — 99214 OFFICE O/P EST MOD 30 MIN: CPT

## 2024-02-07 NOTE — DISCUSSION/SUMMARY
[FreeTextEntry1] : He is a 83-year-old with a history of coronary artery disease status post coronary stenting and myocardial infarction's years ago who now presents with stable symptoms but weight gain. No acute ECG changes are seen. HTN: Optimized now. Continue amlodipine 5 mg daily, continue metoprolol and lisinopril. CAD: Down anginal symptoms. I have scheduled him for an echocardiogram to exclude worsening LV function or valvular disease as a source of his dyspnea. continue crestor.  Weight has increased and I suggested that he Exercise more, possibly on a stationary bicycle and lose weight. I reviewed all this with his wife. semiannual followup.

## 2024-02-07 NOTE — REVIEW OF SYSTEMS
[Feeling Fatigued] : feeling fatigued [Blurry Vision] : blurred vision [Dyspnea on exertion] : dyspnea during exertion [Chest Discomfort] : chest discomfort [Constipation] : constipation [Urinary Frequency] : urinary frequency [Joint Pain] : joint pain [Dizziness] : dizziness [Memory Lapses Or Loss] : memory lapses or loss [Easy Bruising] : a tendency for easy bruising [Lower Ext Edema] : no extremity edema [Palpitations] : no palpitations [Syncope] : no syncope [Cough] : no cough [Rash] : no rash

## 2024-02-07 NOTE — HISTORY OF PRESENT ILLNESS
[FreeTextEntry1] : Noted MATTA with exertion. Accompanied by his wife. Possible COVID over the November December time. He denies any orthopnea, PND but does get short of breath after walking about 100 feet. Edema. He is take his medications as directed.ing Prior: Accompanied by his wife. nerve pain in his feet and poor balance are his biggest concern. No orthopnea or PND. Walking is limited by neuropathy.  Prior: Accompanied by his wife. He has been feeling okay, notes some occasional wheezing.  Not much exercise. No change in medications.  pRIOR: Dear [place], Thank you for referring him for cardiovascular evaluation and management.  He is a 81-year-old with a history of coronary artery disease status post coronary stenting as well as myocardial infarctions over 10 years ago.  He is accompanied by his wife.  Mr. Esteves reports some memory deficits and difficulty walking of late including poor balance as well as significant hand and shoulder pains. He is unclear regarding the extent of his LV dysfunction, if any. He denies any recent cardiovascular work-up. He is taking his medications as directed. He has a history of marijuana smoking for many years and diabetes mellitus which seems to have resolved. He has no significant complaint is exertional chest tightness that is somewhat reminiscent of his anginal pain when he walks his dog. This happens after about a quarter of a mile and resolve spontaneously with rest.

## 2024-02-07 NOTE — PHYSICAL EXAM
[Normal Venous Pressure] : normal venous pressure [Normal S1, S2] : normal S1, S2 [Clear Lung Fields] : clear lung fields [Good Air Entry] : good air entry [Soft] : abdomen soft [Normal Bowel Sounds] : normal bowel sounds [No Edema] : no edema [No Rash] : no rash [Moves all extremities] : moves all extremities [de-identified] : Older gentleman in no acute distress [de-identified] : No murmurs are heard [de-identified] : A shuffling unsteady gait is noted [de-identified] : Some reduced memory and increased access time.

## 2024-02-12 LAB
ALBUMIN SERPL ELPH-MCNC: 4.6 G/DL
ALP BLD-CCNC: 72 U/L
ALT SERPL-CCNC: 24 U/L
ANION GAP SERPL CALC-SCNC: 14 MMOL/L
AST SERPL-CCNC: 23 U/L
BILIRUB SERPL-MCNC: 0.4 MG/DL
BUN SERPL-MCNC: 21 MG/DL
CALCIUM SERPL-MCNC: 9.4 MG/DL
CHLORIDE SERPL-SCNC: 95 MMOL/L
CHOLEST SERPL-MCNC: 148 MG/DL
CO2 SERPL-SCNC: 28 MMOL/L
CREAT SERPL-MCNC: 1.07 MG/DL
EGFR: 70 ML/MIN/1.73M2
ESTIMATED AVERAGE GLUCOSE: 120 MG/DL
GLUCOSE SERPL-MCNC: 96 MG/DL
HBA1C MFR BLD HPLC: 5.8 %
HCT VFR BLD CALC: 45.2 %
HDLC SERPL-MCNC: 43 MG/DL
HGB BLD-MCNC: 15.6 G/DL
LDLC SERPL CALC-MCNC: 72 MG/DL
MCHC RBC-ENTMCNC: 31.8 PG
MCHC RBC-ENTMCNC: 34.5 GM/DL
MCV RBC AUTO: 92.1 FL
NONHDLC SERPL-MCNC: 106 MG/DL
NT-PROBNP SERPL-MCNC: 169 PG/ML
PLATELET # BLD AUTO: 184 K/UL
POTASSIUM SERPL-SCNC: 3.5 MMOL/L
PROT SERPL-MCNC: 7.4 G/DL
RBC # BLD: 4.91 M/UL
RBC # FLD: 13 %
SODIUM SERPL-SCNC: 138 MMOL/L
TRIGL SERPL-MCNC: 204 MG/DL
TSH SERPL-ACNC: 1.89 UIU/ML
WBC # FLD AUTO: 6.21 K/UL

## 2024-03-05 ENCOUNTER — APPOINTMENT (OUTPATIENT)
Dept: CARDIOLOGY | Facility: CLINIC | Age: 84
End: 2024-03-05

## 2024-06-27 ENCOUNTER — NON-APPOINTMENT (OUTPATIENT)
Age: 84
End: 2024-06-27

## 2024-06-27 ENCOUNTER — APPOINTMENT (OUTPATIENT)
Dept: CARDIOLOGY | Facility: CLINIC | Age: 84
End: 2024-06-27
Payer: MEDICARE

## 2024-06-27 VITALS
BODY MASS INDEX: 39.09 KG/M2 | SYSTOLIC BLOOD PRESSURE: 110 MMHG | HEART RATE: 67 BPM | HEIGHT: 64 IN | DIASTOLIC BLOOD PRESSURE: 70 MMHG | WEIGHT: 229 LBS | OXYGEN SATURATION: 94 %

## 2024-06-27 DIAGNOSIS — R60.0 LOCALIZED EDEMA: ICD-10-CM

## 2024-06-27 PROCEDURE — 99214 OFFICE O/P EST MOD 30 MIN: CPT | Mod: 25

## 2024-06-27 PROCEDURE — 93000 ELECTROCARDIOGRAM COMPLETE: CPT

## 2024-07-29 ENCOUNTER — APPOINTMENT (OUTPATIENT)
Dept: CARDIOLOGY | Facility: CLINIC | Age: 84
End: 2024-07-29
Payer: MEDICARE

## 2024-07-29 PROCEDURE — 93306 TTE W/DOPPLER COMPLETE: CPT

## 2024-09-18 ENCOUNTER — APPOINTMENT (OUTPATIENT)
Dept: CARDIOLOGY | Facility: CLINIC | Age: 84
End: 2024-09-18

## 2024-09-18 ENCOUNTER — APPOINTMENT (OUTPATIENT)
Dept: CARDIOLOGY | Facility: CLINIC | Age: 84
End: 2024-09-18
Payer: MEDICARE

## 2024-09-18 ENCOUNTER — NON-APPOINTMENT (OUTPATIENT)
Age: 84
End: 2024-09-18

## 2024-09-18 VITALS
OXYGEN SATURATION: 93 % | DIASTOLIC BLOOD PRESSURE: 72 MMHG | BODY MASS INDEX: 40.29 KG/M2 | HEIGHT: 64 IN | SYSTOLIC BLOOD PRESSURE: 110 MMHG | HEART RATE: 72 BPM | WEIGHT: 236 LBS

## 2024-09-18 PROCEDURE — 99215 OFFICE O/P EST HI 40 MIN: CPT

## 2024-09-18 PROCEDURE — 93000 ELECTROCARDIOGRAM COMPLETE: CPT

## 2024-09-18 PROCEDURE — G2211 COMPLEX E/M VISIT ADD ON: CPT

## 2024-09-18 RX ORDER — POTASSIUM CHLORIDE 10 MEQ
10 CAPSULE, EXTENDED RELEASE ORAL
Refills: 0 | Status: ACTIVE | COMMUNITY

## 2024-09-18 RX ORDER — TORSEMIDE 20 MG/1
20 TABLET ORAL
Qty: 60 | Refills: 5 | Status: ACTIVE | COMMUNITY

## 2024-09-18 RX ORDER — SPIRONOLACTONE 25 MG/1
25 TABLET ORAL
Refills: 0 | Status: ACTIVE | COMMUNITY

## 2024-09-18 NOTE — HISTORY OF PRESENT ILLNESS
[FreeTextEntry1] : Furred back today for evaluation of his right leg which has been swelling more and has been losing as well from a local site.  He denies any shortness of breath when lying down and he does continue to get short of breath and fatigued with minimal walking. He is take his medications including diuretics. He is followed by an internist and has blood work done fairly consistently. We reviewed his medications in detail.  Prior: leg edema for a month. R>L deaf daughter with him. started on torsemide yesterday. And went ultrasound of his leg which showed no evidence of DVT. He has no orthopnea, PND or unusual shortness of breath.  He is fairly sedentary.  Prior: Noted MATTA with exertion. Accompanied by his wife. Possible COVID over the November December time. He denies any orthopnea, PND but does get short of breath after walking about 100 feet. Edema. He is take his medications as directed.ing Prior: Accompanied by his wife. nerve pain in his feet and poor balance are his biggest concern. No orthopnea or PND. Walking is limited by neuropathy.  Prior: Accompanied by his wife. He has been feeling okay, notes some occasional wheezing.  Not much exercise. No change in medications.  pRIOR: Dear [place], Thank you for referring him for cardiovascular evaluation and management.  He is a 81-year-old with a history of coronary artery disease status post coronary stenting as well as myocardial infarctions over 10 years ago.  He is accompanied by his wife.  Mr. Esteves reports some memory deficits and difficulty walking of late including poor balance as well as significant hand and shoulder pains. He is unclear regarding the extent of his LV dysfunction, if any. He denies any recent cardiovascular work-up. He is taking his medications as directed. He has a history of marijuana smoking for many years and diabetes mellitus which seems to have resolved. He has no significant complaint is exertional chest tightness that is somewhat reminiscent of his anginal pain when he walks his dog. This happens after about a quarter of a mile and resolve spontaneously with rest.

## 2024-09-18 NOTE — PHYSICAL EXAM
[Normal Venous Pressure] : normal venous pressure [Normal S1, S2] : normal S1, S2 [Clear Lung Fields] : clear lung fields [Good Air Entry] : good air entry [Soft] : abdomen soft [Normal Bowel Sounds] : normal bowel sounds [No Edema] : no edema [No Rash] : no rash [Moves all extremities] : moves all extremities [de-identified] : Older gentleman in no acute distress [de-identified] : No murmurs are heard [de-identified] : A shuffling unsteady gait is noted [de-identified] : Some reduced memory and increased access time.

## 2024-09-18 NOTE — DISCUSSION/SUMMARY
[EKG obtained to assist in diagnosis and management of assessed problem(s)] : EKG obtained to assist in diagnosis and management of assessed problem(s) [FreeTextEntry1] : He is a 84-year-old with a history of coronary artery disease status post coronary stenting and myocardial infarction's years ago who now presents with worsening leg edema without any other signs of heart failure. ECG: unchanged. SR. Leg edema: I agree with diuretic therapy and have encouraged him to continue the torsemide 40 mg daily and spirinolactone. BP is low. would stop Norvasc. Okay with compression stockings. HTN: His blood pressure is low. okay to stop norvasc. continue metoprolol   CAD: No anginal symptoms. continue Crestor. Continue DAPT. CHF: no sign of CHF by history except leg edema.  Plan reviewed with his wife. I have given him a copy to bring to the wellness center at the [facility].

## 2024-11-19 ENCOUNTER — APPOINTMENT (OUTPATIENT)
Dept: CARDIOLOGY | Facility: CLINIC | Age: 84
End: 2024-11-19
Payer: MEDICARE

## 2024-11-19 ENCOUNTER — NON-APPOINTMENT (OUTPATIENT)
Age: 84
End: 2024-11-19

## 2024-11-19 VITALS
BODY MASS INDEX: 39.82 KG/M2 | HEART RATE: 84 BPM | OXYGEN SATURATION: 93 % | SYSTOLIC BLOOD PRESSURE: 136 MMHG | WEIGHT: 232 LBS | DIASTOLIC BLOOD PRESSURE: 88 MMHG

## 2024-11-19 DIAGNOSIS — I10 ESSENTIAL (PRIMARY) HYPERTENSION: ICD-10-CM

## 2024-11-19 DIAGNOSIS — R60.0 LOCALIZED EDEMA: ICD-10-CM

## 2024-11-19 DIAGNOSIS — I25.10 ATHEROSCLEROTIC HEART DISEASE OF NATIVE CORONARY ARTERY W/OUT ANGINA PECTORIS: ICD-10-CM

## 2024-11-19 PROCEDURE — G2211 COMPLEX E/M VISIT ADD ON: CPT

## 2024-11-19 PROCEDURE — 99214 OFFICE O/P EST MOD 30 MIN: CPT

## 2024-11-19 PROCEDURE — 93000 ELECTROCARDIOGRAM COMPLETE: CPT

## 2024-11-20 LAB
ALBUMIN SERPL ELPH-MCNC: 4.6 G/DL
ALP BLD-CCNC: 73 U/L
ALT SERPL-CCNC: 26 U/L
ANION GAP SERPL CALC-SCNC: 16 MMOL/L
AST SERPL-CCNC: 25 U/L
BILIRUB SERPL-MCNC: 0.7 MG/DL
BUN SERPL-MCNC: 27 MG/DL
CALCIUM SERPL-MCNC: 9.7 MG/DL
CHLORIDE SERPL-SCNC: 96 MMOL/L
CHOLEST SERPL-MCNC: 166 MG/DL
CO2 SERPL-SCNC: 26 MMOL/L
CREAT SERPL-MCNC: 1.21 MG/DL
EGFR: 60 ML/MIN/1.73M2
GLUCOSE SERPL-MCNC: 94 MG/DL
HCT VFR BLD CALC: 48.2 %
HDLC SERPL-MCNC: 43 MG/DL
HGB BLD-MCNC: 16.3 G/DL
LDLC SERPL CALC-MCNC: 86 MG/DL
MCHC RBC-ENTMCNC: 33.3 PG
MCHC RBC-ENTMCNC: 33.8 G/DL
MCV RBC AUTO: 98.6 FL
NONHDLC SERPL-MCNC: 123 MG/DL
PLATELET # BLD AUTO: 189 K/UL
POTASSIUM SERPL-SCNC: 3.8 MMOL/L
PROT SERPL-MCNC: 7.8 G/DL
RBC # BLD: 4.89 M/UL
RBC # FLD: 13.1 %
SODIUM SERPL-SCNC: 138 MMOL/L
TRIGL SERPL-MCNC: 220 MG/DL
WBC # FLD AUTO: 6.53 K/UL

## 2024-12-13 ENCOUNTER — NON-APPOINTMENT (OUTPATIENT)
Age: 84
End: 2024-12-13

## 2025-05-20 ENCOUNTER — APPOINTMENT (OUTPATIENT)
Dept: CARDIOLOGY | Facility: CLINIC | Age: 85
End: 2025-05-20
Payer: MEDICARE

## 2025-05-20 ENCOUNTER — NON-APPOINTMENT (OUTPATIENT)
Age: 85
End: 2025-05-20

## 2025-05-20 VITALS — HEART RATE: 81 BPM | DIASTOLIC BLOOD PRESSURE: 72 MMHG | SYSTOLIC BLOOD PRESSURE: 110 MMHG | OXYGEN SATURATION: 94 %

## 2025-05-20 DIAGNOSIS — I25.10 ATHEROSCLEROTIC HEART DISEASE OF NATIVE CORONARY ARTERY W/OUT ANGINA PECTORIS: ICD-10-CM

## 2025-05-20 DIAGNOSIS — E11.21 TYPE 2 DIABETES MELLITUS WITH DIABETIC NEPHROPATHY: ICD-10-CM

## 2025-05-20 DIAGNOSIS — I10 ESSENTIAL (PRIMARY) HYPERTENSION: ICD-10-CM

## 2025-05-20 PROCEDURE — 93000 ELECTROCARDIOGRAM COMPLETE: CPT

## 2025-05-20 PROCEDURE — G2211 COMPLEX E/M VISIT ADD ON: CPT

## 2025-05-20 PROCEDURE — 99214 OFFICE O/P EST MOD 30 MIN: CPT

## 2025-05-30 ENCOUNTER — INPATIENT (INPATIENT)
Facility: HOSPITAL | Age: 85
LOS: 19 days | Discharge: NURSING, REHAB OR LONG-TERM CARE | DRG: 299 | End: 2025-06-19
Attending: STUDENT IN AN ORGANIZED HEALTH CARE EDUCATION/TRAINING PROGRAM | Admitting: HOSPITALIST
Payer: MEDICARE

## 2025-05-30 VITALS
WEIGHT: 229.94 LBS | RESPIRATION RATE: 76 BRPM | OXYGEN SATURATION: 90 % | TEMPERATURE: 98 F | HEIGHT: 69 IN | DIASTOLIC BLOOD PRESSURE: 70 MMHG | SYSTOLIC BLOOD PRESSURE: 120 MMHG | HEART RATE: 90 BPM

## 2025-05-30 PROCEDURE — 99285 EMERGENCY DEPT VISIT HI MDM: CPT | Mod: GC

## 2025-05-30 NOTE — ED ADULT TRIAGE NOTE - NS ED TRIAGE AVPU SCALE
Alert-The patient is alert, awake and responds to voice. The patient is oriented to time, place, and person. The triage nurse is able to obtain subjective information. Shortness of breath with minimal ambulation

## 2025-05-30 NOTE — ED ADULT NURSE NOTE - OBJECTIVE STATEMENT
Pt is an 83 yo male coming in from the [facility] by EMS for a fall. Pt had a mechanical fall while ambulating. Pt denies LOC or hitting his head from what he remembers. Upon assessment, pt noted to have tiny laceration on back of head. Pt breathing comfortably on RA. Pt appears hemodynamically stable, no signs of cardiac distress. Skin normal for race, no diaphoresis. 3+ pitting edema noted on lower extremities. Patient denies headache, dizziness, chest pain, palpitations, cough, SOB, abdominal pain, n/v/d, urinary symptoms, fevers, chills, at this time. Safety maintained. Pt is an 83 yo male coming in from the [facility] by EMS for a fall. Pt had multiple mechanical falls while ambulating. Pt denies LOC or hitting his head from what he remembers. Patient A&Ox2, has history of dementia. Upon assessment, pt noted to have tiny laceration on back of head. Pt breathing comfortably on RA. Pt appears hemodynamically stable, no signs of cardiac distress. Skin normal for race, no diaphoresis. 3+ pitting edema noted on right lower extremity. Patient denies headache, dizziness, chest pain, palpitations, cough, SOB, abdominal pain, n/v/d, urinary symptoms, fevers, chills, at this time. Safety maintained.

## 2025-05-30 NOTE — ED ADULT NURSE NOTE - NSFALLRISKINTERV_ED_ALL_ED

## 2025-05-30 NOTE — ED ADULT TRIAGE NOTE - PAIN RATING/NUMBER SCALE (0-10): ACTIVITY
[facility] ED Provider Note   Patient: Valarie Talavera  MRN #:  [id number]  Date of Visit: December 11, 2024    CC:     Chief Complaint   Patient presents with    Abdominal Pain    Nausea & Vomiting     HPI:  Valarei Talavera is a 35 year old female who presented to the emergency department with approximately 8-day history of left upper quadrant abdominal pain with a sharp quality.  Patient reports onset of a respiratory and flulike illness about 2 weeks ago.  She had 2 ER visits, and chest x-ray, and multiplex PCR tests were negative.  She continues to have some intermittent coughing but her fevers have subsided.  Patient still has a cough that is aggravating her left sided pain.  However she is having more left upper quadrant abdominal pain, triggered by eating.  She is still throwing up occasionally but without any hematemesis or coffee-ground emesis.  She is having some ongoing watery stools.  She thinks that she is lost some weight during this time.  Patient had been taking regular doses of Tylenol, ibuprofen and oxycodone.  She is concerned about the possibility of gastritis.  She has no previous abdominal surgeries, and does not recall ever being diagnosed with peptic ulcer disease, gallbladder disease, or pancreatitis.  No new or recent medications.  Patient works at a , no previous upper endoscopies or colonoscopies.    Problem List:  Patient Active Problem List    Diagnosis Date Noted    Hirsutism 02/19/2024     Priority: Medium    Moderate episode of recurrent major depressive disorder (H) 02/19/2024     Priority: Medium    Tenosynovitis, de Quervain (right hand) 06/12/2019     Priority: Medium    Class 3 severe obesity due to excess calories without serious comorbidity with body mass index (BMI) of 40.0 to 44.9 in adult (H) 03/15/2019     Priority: Medium    Hyperlipidemia LDL goal <160 03/15/2019     Priority: Medium    Bilateral  carpal tunnel syndrome 06/15/2018     Priority: Medium    Cubital tunnel syndrome of both upper extremities 06/15/2018     Priority: Medium    Term pregnancy 10/14/2017     Priority: Medium    Gastroesophageal reflux disease with esophagitis 10/04/2017     Priority: Medium    Back pain 08/23/2017     Priority: Medium    Hyperglycemia 07/13/2017     Priority: Medium    Nuchal cord, 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 Plan colp  2/11/16 [facility].  HSIL. Age 26  Plan: per tele enc on 2/18/16 [facility] and cytology in August.  Will have pathologist differentiate between WESLY 2 and 3 if possible.  If lesion is worse, then LEEP.  If it is the same or better, then colp and cytology in 6 months.    8/30/16 HSIL pap/+ HR HPV (not 16 or 18). [facility] not completed.  Per Dr. Luna, have pt. Schedule colp bef 11/30/16.  9/9/16 Pt. Advised.  9/22/16 [facility]. NO SHOW.  10/6/16 [facility] visit. NO SHOW.  1/11/17 [facility] not done. Tracking updated for 6 mo colp/pap.   3/10/17 ASCUS pap/+ HR HPV (not 16 or 18). approx 9 weeks pregnant. Plan: colp around 20 weeks gestation, approx 5/26/17  10/14/17 delivered baby. (6 wk pp due 11/25/17)  3/25/19 [facility] bx: normal, per provider. Plan: Cotest in 1 due 3/25/20    3/3/21 Reminder letter  4/5/21 Reminder call - spoke to pt.   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 2/2/2016    Low blood pressure     Vitamin D deficiency        MEDS: cefdinir (OMNICEF) 300 MG capsule  omeprazole (PRILOSEC) 20 MG DR capsule  ondansetron (ZOFRAN ODT) 4 MG ODT tab  oxyCODONE (ROXICODONE) 5 MG  "tablet        ALLERGIES:    Allergies   Allergen Reactions    Other Drug Allergy (See Comments) Hives     Essential Oils    Zithromax [Azithromycin] Other (See Comments)     hives       Past Surgical History:   Procedure Laterality Date    NO HISTORY OF SURGERY         Social History     Tobacco Use    Smoking status: Former     Types: Cigarettes    Smokeless tobacco: Never    Tobacco comments:     very rare   Substance Use Topics    Alcohol use: No     Alcohol/week: 0.0 standard drinks of alcohol    Drug use: No         Review of Systems   Except as noted in HPI, all other systems were reviewed and are negative    Physical Exam   Vitals were reviewed  Patient Vitals for the past 12 hrs:   BP Temp Temp src Pulse Resp SpO2 Height Weight   12/11/24 1738 (!) 143/83 97.9  F (36.6  C) Temporal 96 24 95 % 1.727 m (5' 8\") 115.6 kg (254 lb 12.8 oz)     GENERAL APPEARANCE: Alert and oriented x 3, intermittent coughing  FACE: normal facies  EYES: Pupils are equal; sclera no scleral icterus  HENT: normal external exam; oropharynx is noninjected, mucous membranes are moist  NECK: no adenopathy or asymmetry  RESP: normal respiratory effort; clear breath sounds bilaterally  CV: regular rate and rhythm; no significant murmurs, gallops or rubs  ABD: soft, obese, left upper quadrant tenderness; no rebound or guarding; bowel sounds are normal  MS: no gross deformities noted; normal muscle tone.  EXT: No calf tenderness or pitting edema  SKIN: no worrisome rash  NEURO: no facial droop; no focal deficits, speech is normal  PSYCH: normal mood and affect      Available Lab/Imaging Results     Results for orders placed or performed during the hospital encounter of 12/11/24 (from the past 24 hours)   CBC with platelets differential    Narrative    The following orders were created for panel order CBC with platelets differential.  Procedure                               Abnormality         Status                     ---------                "                -----------         ------                     CBC with platelets and d...[[id number]]  Abnormal            Final result                 Please view results for these tests on the individual orders.   Comprehensive metabolic panel   Result Value Ref Range    Sodium 138 135 - 145 mmol/L    Potassium 3.7 3.4 - 5.3 mmol/L    Carbon Dioxide (CO2) 22 22 - 29 mmol/L    Anion Gap 16 (H) 7 - 15 mmol/L    Urea Nitrogen 10.3 6.0 - 20.0 mg/dL    Creatinine 0.62 0.51 - 0.95 mg/dL    GFR Estimate >90 >60 mL/min/1.73m2    Calcium 9.7 8.8 - 10.4 mg/dL    Chloride 100 98 - 107 mmol/L    Glucose 88 70 - 99 mg/dL    Alkaline Phosphatase 80 40 - 150 U/L    AST 31 0 - 45 U/L    ALT 46 0 - 50 U/L    Protein Total 8.1 6.4 - 8.3 g/dL    Albumin 4.0 3.5 - 5.2 g/dL    Bilirubin Total 0.4 <=1.2 mg/dL   Lipase   Result Value Ref Range    Lipase 45 13 - 60 U/L   CBC with platelets and differential   Result Value Ref Range    WBC Count 10.2 4.0 - 11.0 10e3/uL    RBC Count 4.35 3.80 - 5.20 10e6/uL    Hemoglobin 13.4 11.7 - 15.7 g/dL    Hematocrit 37.4 35.0 - 47.0 %    MCV 86 78 - 100 fL    MCH 30.8 26.5 - 33.0 pg    MCHC 35.8 31.5 - 36.5 g/dL    RDW 13.1 10.0 - 15.0 %    Platelet Count 478 (H) 150 - 450 10e3/uL    % Neutrophils 66 %    % Lymphocytes 23 %    % Monocytes 7 %    % Eosinophils 3 %    % Basophils 1 %    % Immature Granulocytes 1 %    NRBCs per 100 WBC 0 <1 /100    Absolute Neutrophils 6.8 1.6 - 8.3 10e3/uL    Absolute Lymphocytes 2.3 0.8 - 5.3 10e3/uL    Absolute Monocytes 0.7 0.0 - 1.3 10e3/uL    Absolute Eosinophils 0.3 0.0 - 0.7 10e3/uL    Absolute Basophils 0.1 0.0 - 0.2 10e3/uL    Absolute Immature Granulocytes 0.1 <=0.4 10e3/uL    Absolute NRBCs 0.0 10e3/uL   UA with Microscopic reflex to Culture    Specimen: Urine, Clean Catch   Result Value Ref Range    Color Urine Yellow Colorless, Straw, Light Yellow, Yellow    Appearance Urine Clear Clear    Glucose Urine Negative Negative mg/dL    Bilirubin Urine Negative  Negative    Ketones Urine Negative Negative mg/dL    Specific Gravity Urine 1.029 1.003 - 1.035    Blood Urine Negative Negative    pH Urine 5.5 5.0 - 7.0    Protein Albumin Urine 10 (A) Negative mg/dL    Urobilinogen Urine Normal Normal, 2.0 mg/dL    Nitrite Urine Negative Negative    Leukocyte Esterase Urine Negative Negative    Mucus Urine Present (A) None Seen /LPF    RBC Urine 2 <=2 /HPF    WBC Urine 2 <=5 /HPF    Squamous Epithelials Urine 2 (H) <=1 /HPF    Narrative    Urine Culture not indicated   XR Chest 2 Views    Narrative    EXAM: XR CHEST 2 VIEWS  LOCATION: [facility]  DATE: 12/11/2024    INDICATION: Persistent cough, left upper quadrant abdominal and lower left lower quadrant chest pain.  COMPARISON: 12/2/2024      Impression    IMPRESSION: Decreasing small left lower lobe consolidation which most likely represents pneumonia. Small pleural effusions. The cardiac silhouette and pulmonary vasculature are normal.              Impression     Final diagnoses:   Abdominal pain, left upper quadrant   Pneumonia of left lower lobe due to infectious organism         ED Course & Medical Decision Making   Valarie Talavera is a 35 year old female who presented to the emergency department with approximately 8-day history of left upper quadrant abdominal pain in the setting of recent febrile respiratory and GI illness.  She developed a cough, body aches, and fever, was seen a couple of times in the ED.  Her workup including a multiplex PCR panel and chest x-rays did not reveal any specific cause for her illness.  She was subsequently diagnosed as acute viral syndrome, and upper respiratory infection.  Since then, she has developed more constant left upper quadrant abdominal pain, worsened by eating.  Pain is worse in the afternoon and into the evenings.  She is having some vomiting but this may be triggered by coughing.  She reports that after eating or drinking, she has abdominal pain  that comes on about 15 minutes later.  There has been no melena or bloody emesis.    Vital signs reveal a temp of 97.9, blood pressure 143/83, heart rate of 96, respiration 24, 95% oxygen saturation.  Patient is in no acute respiratory distress.  She has some intermittent coughing.  This seems to be triggering some left-sided chest discomfort.  She is also has tenderness in the left upper quadrant of the abdomen.  No obvious hepatosplenomegaly although exam is limited due to body habitus.  No rebound or guarding in the lower abdomen and no localized tenderness to right upper quadrant.    Patient's workup reveals a normal white blood count and hemoglobin, comprehensive metabolic panel was completely normal, lipase levels 45.  Urinalysis reveals 2 red blood cells, 2 white blood cells and 2 squamous epithelial cells but otherwise negative.  Chest x-ray reveals a decreasing small left lower lobe consolidation which most likely represents pneumonia.  Small pleural effusions.  Cardiac silhouette and pulmonary vasculature are normal.  I reviewed the x-ray personally and compared it with previous x-ray from December 2.  The left lateral diaphragm is obscured on today's chest x-ray, and this consolidation could not be clearly seen on the previous x-ray.  Patient was informed of her workup.  It is possible that she may be developing gastritis from taking NSAIDs for 2 weeks, and that she actually had a left lower lobe pneumonia..  She still has a cough but no reported fever.  We will cover her with an antibiotic to see if this will help clear her residual infection.  Patient will also begin omeprazole for the next 2 to 4 weeks.  Patient expressed understanding and agreement with discharge instructions below.        Written after-visit summary and instructions were given at the time of discharge.    Follow up Plan:   Luis Crisostomo MD  [address] [place] [id number]  [phone]    In 1 week  if not  improving      Discharge Instructions:   Your blood work were reassuring.  The x-ray of your chest reveals a resolving left lower lobe pneumonia.  Take Omnicef 300 mg twice a day for 7-10 days to treat the pneumonia.  Add omeprazole 20 mg up to twice a day for 2 weeks.  Avoid ibuprofen medications for now.  Follow-up with your primary care provider in 7-10 days if not improving.       Disclaimer: This note consists of words and symbols derived from keyboarding and dictation using voice recognition software.  As a result, there may be errors that have gone undetected.  Please consider this when interpreting information found in this note.       Harini Alexander MD  12/12/24 0036     0 (no pain/absence of nonverbal indicators of pain)
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
## 2025-05-31 DIAGNOSIS — I25.10 ATHEROSCLEROTIC HEART DISEASE OF NATIVE CORONARY ARTERY WITHOUT ANGINA PECTORIS: ICD-10-CM

## 2025-05-31 DIAGNOSIS — I82.409 ACUTE EMBOLISM AND THROMBOSIS OF UNSPECIFIED DEEP VEINS OF UNSPECIFIED LOWER EXTREMITY: ICD-10-CM

## 2025-05-31 DIAGNOSIS — N17.9 ACUTE KIDNEY FAILURE, UNSPECIFIED: ICD-10-CM

## 2025-05-31 DIAGNOSIS — I50.32 CHRONIC DIASTOLIC (CONGESTIVE) HEART FAILURE: ICD-10-CM

## 2025-05-31 DIAGNOSIS — Z29.9 ENCOUNTER FOR PROPHYLACTIC MEASURES, UNSPECIFIED: ICD-10-CM

## 2025-05-31 DIAGNOSIS — I82.401 ACUTE EMBOLISM AND THROMBOSIS OF UNSPECIFIED DEEP VEINS OF RIGHT LOWER EXTREMITY: ICD-10-CM

## 2025-05-31 LAB
ADD ON TEST-SPECIMEN IN LAB: SIGNIFICANT CHANGE UP
ALBUMIN SERPL ELPH-MCNC: 3.9 G/DL — SIGNIFICANT CHANGE UP (ref 3.3–5)
ALP SERPL-CCNC: 71 U/L — SIGNIFICANT CHANGE UP (ref 40–120)
ALT FLD-CCNC: 14 U/L — SIGNIFICANT CHANGE UP (ref 10–45)
ANION GAP SERPL CALC-SCNC: 16 MMOL/L — SIGNIFICANT CHANGE UP (ref 5–17)
APPEARANCE UR: CLEAR — SIGNIFICANT CHANGE UP
APTT BLD: 147.3 SEC — CRITICAL HIGH (ref 26.1–36.8)
APTT BLD: 25.1 SEC — LOW (ref 26.1–36.8)
APTT BLD: 65.3 SEC — HIGH (ref 26.1–36.8)
AST SERPL-CCNC: 25 U/L — SIGNIFICANT CHANGE UP (ref 10–40)
BACTERIA # UR AUTO: NEGATIVE /HPF — SIGNIFICANT CHANGE UP
BASOPHILS # BLD AUTO: 0 K/UL — SIGNIFICANT CHANGE UP (ref 0–0.2)
BASOPHILS NFR BLD AUTO: 0 % — SIGNIFICANT CHANGE UP (ref 0–2)
BILIRUB SERPL-MCNC: 0.8 MG/DL — SIGNIFICANT CHANGE UP (ref 0.2–1.2)
BILIRUB UR-MCNC: NEGATIVE — SIGNIFICANT CHANGE UP
BUN SERPL-MCNC: 34 MG/DL — HIGH (ref 7–23)
CALCIUM SERPL-MCNC: 9.3 MG/DL — SIGNIFICANT CHANGE UP (ref 8.4–10.5)
CAST: 27 /LPF — HIGH (ref 0–4)
CHLORIDE SERPL-SCNC: 97 MMOL/L — SIGNIFICANT CHANGE UP (ref 96–108)
CO2 SERPL-SCNC: 23 MMOL/L — SIGNIFICANT CHANGE UP (ref 22–31)
COLOR SPEC: YELLOW — SIGNIFICANT CHANGE UP
CREAT SERPL-MCNC: 1.81 MG/DL — HIGH (ref 0.5–1.3)
DIFF PNL FLD: NEGATIVE — SIGNIFICANT CHANGE UP
EGFR: 37 ML/MIN/1.73M2 — LOW
EGFR: 37 ML/MIN/1.73M2 — LOW
EOSINOPHIL # BLD AUTO: 0.08 K/UL — SIGNIFICANT CHANGE UP (ref 0–0.5)
EOSINOPHIL NFR BLD AUTO: 0.9 % — SIGNIFICANT CHANGE UP (ref 0–6)
FLUAV AG NPH QL: SIGNIFICANT CHANGE UP
FLUBV AG NPH QL: SIGNIFICANT CHANGE UP
GAS PNL BLDA: SIGNIFICANT CHANGE UP
GLUCOSE SERPL-MCNC: 112 MG/DL — HIGH (ref 70–99)
GLUCOSE UR QL: NEGATIVE MG/DL — SIGNIFICANT CHANGE UP
HCT VFR BLD CALC: 41.4 % — SIGNIFICANT CHANGE UP (ref 39–50)
HCT VFR BLD CALC: 41.5 % — SIGNIFICANT CHANGE UP (ref 39–50)
HGB BLD-MCNC: 14.1 G/DL — SIGNIFICANT CHANGE UP (ref 13–17)
HGB BLD-MCNC: 14.3 G/DL — SIGNIFICANT CHANGE UP (ref 13–17)
HYALINE CASTS # UR AUTO: PRESENT
INR BLD: 1.12 RATIO — SIGNIFICANT CHANGE UP (ref 0.85–1.16)
KETONES UR QL: NEGATIVE MG/DL — SIGNIFICANT CHANGE UP
LEUKOCYTE ESTERASE UR-ACNC: ABNORMAL
LYMPHOCYTES # BLD AUTO: 1.77 K/UL — SIGNIFICANT CHANGE UP (ref 1–3.3)
LYMPHOCYTES # BLD AUTO: 18.8 % — SIGNIFICANT CHANGE UP (ref 13–44)
MANUAL SMEAR VERIFICATION: SIGNIFICANT CHANGE UP
MCHC RBC-ENTMCNC: 32.5 PG — SIGNIFICANT CHANGE UP (ref 27–34)
MCHC RBC-ENTMCNC: 32.7 PG — SIGNIFICANT CHANGE UP (ref 27–34)
MCHC RBC-ENTMCNC: 34.1 G/DL — SIGNIFICANT CHANGE UP (ref 32–36)
MCHC RBC-ENTMCNC: 34.5 G/DL — SIGNIFICANT CHANGE UP (ref 32–36)
MCV RBC AUTO: 95 FL — SIGNIFICANT CHANGE UP (ref 80–100)
MCV RBC AUTO: 95.4 FL — SIGNIFICANT CHANGE UP (ref 80–100)
MONOCYTES # BLD AUTO: 0.48 K/UL — SIGNIFICANT CHANGE UP (ref 0–0.9)
MONOCYTES NFR BLD AUTO: 5.1 % — SIGNIFICANT CHANGE UP (ref 2–14)
NEUTROPHILS # BLD AUTO: 6.99 K/UL — SIGNIFICANT CHANGE UP (ref 1.8–7.4)
NEUTROPHILS NFR BLD AUTO: 67.5 % — SIGNIFICANT CHANGE UP (ref 43–77)
NEUTS BAND # BLD: 6.8 % — SIGNIFICANT CHANGE UP (ref 0–8)
NEUTS BAND NFR BLD: 6.8 % — SIGNIFICANT CHANGE UP (ref 0–8)
NITRITE UR-MCNC: NEGATIVE — SIGNIFICANT CHANGE UP
NRBC BLD AUTO-RTO: 0 /100 WBCS — SIGNIFICANT CHANGE UP (ref 0–0)
PH UR: 5 — SIGNIFICANT CHANGE UP (ref 5–8)
PLAT MORPH BLD: NORMAL — SIGNIFICANT CHANGE UP
PLATELET # BLD AUTO: 127 K/UL — LOW (ref 150–400)
PLATELET # BLD AUTO: 145 K/UL — LOW (ref 150–400)
POTASSIUM SERPL-MCNC: 3.7 MMOL/L — SIGNIFICANT CHANGE UP (ref 3.5–5.3)
POTASSIUM SERPL-SCNC: 3.7 MMOL/L — SIGNIFICANT CHANGE UP (ref 3.5–5.3)
PROT SERPL-MCNC: 6.9 G/DL — SIGNIFICANT CHANGE UP (ref 6–8.3)
PROT UR-MCNC: NEGATIVE MG/DL — SIGNIFICANT CHANGE UP
PROTHROM AB SERPL-ACNC: 12.8 SEC — SIGNIFICANT CHANGE UP (ref 9.9–13.4)
RBC # BLD: 4.34 M/UL — SIGNIFICANT CHANGE UP (ref 4.2–5.8)
RBC # BLD: 4.37 M/UL — SIGNIFICANT CHANGE UP (ref 4.2–5.8)
RBC # FLD: 12.9 % — SIGNIFICANT CHANGE UP (ref 10.3–14.5)
RBC # FLD: 13 % — SIGNIFICANT CHANGE UP (ref 10.3–14.5)
RBC BLD AUTO: SIGNIFICANT CHANGE UP
RBC CASTS # UR COMP ASSIST: 2 /HPF — SIGNIFICANT CHANGE UP (ref 0–4)
REVIEW: SIGNIFICANT CHANGE UP
RSV RNA NPH QL NAA+NON-PROBE: SIGNIFICANT CHANGE UP
SARS-COV-2 RNA SPEC QL NAA+PROBE: SIGNIFICANT CHANGE UP
SODIUM SERPL-SCNC: 136 MMOL/L — SIGNIFICANT CHANGE UP (ref 135–145)
SOURCE RESPIRATORY: SIGNIFICANT CHANGE UP
SP GR SPEC: 1.01 — SIGNIFICANT CHANGE UP (ref 1–1.03)
SQUAMOUS # UR AUTO: 2 /HPF — SIGNIFICANT CHANGE UP (ref 0–5)
TROPONIN T, HIGH SENSITIVITY RESULT: 96 NG/L — HIGH (ref 0–51)
UROBILINOGEN FLD QL: 1 MG/DL — SIGNIFICANT CHANGE UP (ref 0.2–1)
VARIANT LYMPHS # BLD: 0.9 % — SIGNIFICANT CHANGE UP (ref 0–6)
VARIANT LYMPHS NFR BLD MANUAL: 0.9 % — SIGNIFICANT CHANGE UP (ref 0–6)
WBC # BLD: 7.05 K/UL — SIGNIFICANT CHANGE UP (ref 3.8–10.5)
WBC # BLD: 9.41 K/UL — SIGNIFICANT CHANGE UP (ref 3.8–10.5)
WBC # FLD AUTO: 7.05 K/UL — SIGNIFICANT CHANGE UP (ref 3.8–10.5)
WBC # FLD AUTO: 9.41 K/UL — SIGNIFICANT CHANGE UP (ref 3.8–10.5)
WBC UR QL: 1 /HPF — SIGNIFICANT CHANGE UP (ref 0–5)

## 2025-05-31 PROCEDURE — 70450 CT HEAD/BRAIN W/O DYE: CPT | Mod: 26

## 2025-05-31 PROCEDURE — 99223 1ST HOSP IP/OBS HIGH 75: CPT

## 2025-05-31 PROCEDURE — 93971 EXTREMITY STUDY: CPT | Mod: 26,RT

## 2025-05-31 PROCEDURE — 99233 SBSQ HOSP IP/OBS HIGH 50: CPT

## 2025-05-31 PROCEDURE — 93010 ELECTROCARDIOGRAM REPORT: CPT | Mod: 77

## 2025-05-31 PROCEDURE — 71045 X-RAY EXAM CHEST 1 VIEW: CPT | Mod: 26

## 2025-05-31 PROCEDURE — 72170 X-RAY EXAM OF PELVIS: CPT | Mod: 26

## 2025-05-31 PROCEDURE — 93010 ELECTROCARDIOGRAM REPORT: CPT

## 2025-05-31 PROCEDURE — 72125 CT NECK SPINE W/O DYE: CPT | Mod: 26

## 2025-05-31 RX ORDER — CEFTRIAXONE 500 MG/1
INJECTION, POWDER, FOR SOLUTION INTRAMUSCULAR; INTRAVENOUS
Refills: 0 | Status: DISCONTINUED | OUTPATIENT
Start: 2025-05-31 | End: 2025-06-03

## 2025-05-31 RX ORDER — CEFTRIAXONE 500 MG/1
1000 INJECTION, POWDER, FOR SOLUTION INTRAMUSCULAR; INTRAVENOUS EVERY 24 HOURS
Refills: 0 | Status: DISCONTINUED | OUTPATIENT
Start: 2025-06-01 | End: 2025-06-03

## 2025-05-31 RX ORDER — ACETAMINOPHEN 500 MG/5ML
1000 LIQUID (ML) ORAL ONCE
Refills: 0 | Status: COMPLETED | OUTPATIENT
Start: 2025-05-31 | End: 2025-05-31

## 2025-05-31 RX ORDER — AZITHROMYCIN 250 MG
500 CAPSULE ORAL ONCE
Refills: 0 | Status: DISCONTINUED | OUTPATIENT
Start: 2025-05-31 | End: 2025-06-01

## 2025-05-31 RX ORDER — AZITHROMYCIN 250 MG
CAPSULE ORAL
Refills: 0 | Status: DISCONTINUED | OUTPATIENT
Start: 2025-05-31 | End: 2025-06-01

## 2025-05-31 RX ORDER — HEPARIN SODIUM 1000 [USP'U]/ML
INJECTION INTRAVENOUS; SUBCUTANEOUS
Qty: 25000 | Refills: 0 | Status: DISCONTINUED | OUTPATIENT
Start: 2025-05-31 | End: 2025-06-06

## 2025-05-31 RX ORDER — LINACLOTIDE 290 UG/1
72 CAPSULE, GELATIN COATED ORAL
Refills: 0 | Status: DISCONTINUED | OUTPATIENT
Start: 2025-06-01 | End: 2025-06-19

## 2025-05-31 RX ORDER — CLOPIDOGREL BISULFATE 75 MG/1
75 TABLET, FILM COATED ORAL DAILY
Refills: 0 | Status: DISCONTINUED | OUTPATIENT
Start: 2025-05-31 | End: 2025-06-06

## 2025-05-31 RX ORDER — CEFTRIAXONE 500 MG/1
1000 INJECTION, POWDER, FOR SOLUTION INTRAMUSCULAR; INTRAVENOUS ONCE
Refills: 0 | Status: COMPLETED | OUTPATIENT
Start: 2025-05-31 | End: 2025-05-31

## 2025-05-31 RX ORDER — FUROSEMIDE 10 MG/ML
40 INJECTION INTRAMUSCULAR; INTRAVENOUS ONCE
Refills: 0 | Status: COMPLETED | OUTPATIENT
Start: 2025-05-31 | End: 2025-05-31

## 2025-05-31 RX ORDER — AZITHROMYCIN 250 MG
500 CAPSULE ORAL EVERY 24 HOURS
Refills: 0 | Status: DISCONTINUED | OUTPATIENT
Start: 2025-06-01 | End: 2025-06-01

## 2025-05-31 RX ORDER — ROSUVASTATIN CALCIUM 20 MG/1
10 TABLET, FILM COATED ORAL AT BEDTIME
Refills: 0 | Status: DISCONTINUED | OUTPATIENT
Start: 2025-05-31 | End: 2025-06-19

## 2025-05-31 RX ORDER — HEPARIN SODIUM 1000 [USP'U]/ML
8000 INJECTION INTRAVENOUS; SUBCUTANEOUS EVERY 6 HOURS
Refills: 0 | Status: DISCONTINUED | OUTPATIENT
Start: 2025-05-31 | End: 2025-06-06

## 2025-05-31 RX ORDER — METOPROLOL SUCCINATE 50 MG/1
25 TABLET, EXTENDED RELEASE ORAL DAILY
Refills: 0 | Status: DISCONTINUED | OUTPATIENT
Start: 2025-05-31 | End: 2025-06-19

## 2025-05-31 RX ORDER — LEVALBUTEROL HYDROCHLORIDE 1.25 MG/3ML
0.63 SOLUTION RESPIRATORY (INHALATION) ONCE
Refills: 0 | Status: COMPLETED | OUTPATIENT
Start: 2025-05-31 | End: 2025-05-31

## 2025-05-31 RX ORDER — LEVALBUTEROL HYDROCHLORIDE 1.25 MG/3ML
0.63 SOLUTION RESPIRATORY (INHALATION) EVERY 6 HOURS
Refills: 0 | Status: DISCONTINUED | OUTPATIENT
Start: 2025-05-31 | End: 2025-06-04

## 2025-05-31 RX ORDER — SPIRONOLACTONE 25 MG
25 TABLET ORAL DAILY
Refills: 0 | Status: DISCONTINUED | OUTPATIENT
Start: 2025-05-31 | End: 2025-06-19

## 2025-05-31 RX ORDER — IPRATROPIUM BROMIDE AND ALBUTEROL SULFATE .5; 2.5 MG/3ML; MG/3ML
3 SOLUTION RESPIRATORY (INHALATION) ONCE
Refills: 0 | Status: COMPLETED | OUTPATIENT
Start: 2025-05-31 | End: 2025-05-31

## 2025-05-31 RX ORDER — HEPARIN SODIUM 1000 [USP'U]/ML
4000 INJECTION INTRAVENOUS; SUBCUTANEOUS EVERY 6 HOURS
Refills: 0 | Status: DISCONTINUED | OUTPATIENT
Start: 2025-05-31 | End: 2025-06-06

## 2025-05-31 RX ORDER — ESCITALOPRAM OXALATE 20 MG/1
10 TABLET ORAL DAILY
Refills: 0 | Status: DISCONTINUED | OUTPATIENT
Start: 2025-05-31 | End: 2025-06-19

## 2025-05-31 RX ORDER — MIRABEGRON 50 MG/1
25 TABLET, FILM COATED, EXTENDED RELEASE ORAL DAILY
Refills: 0 | Status: DISCONTINUED | OUTPATIENT
Start: 2025-06-01 | End: 2025-06-19

## 2025-05-31 RX ORDER — HEPARIN SODIUM 1000 [USP'U]/ML
8000 INJECTION INTRAVENOUS; SUBCUTANEOUS ONCE
Refills: 0 | Status: COMPLETED | OUTPATIENT
Start: 2025-05-31 | End: 2025-05-31

## 2025-05-31 RX ADMIN — Medication 400 MILLIGRAM(S): at 23:02

## 2025-05-31 RX ADMIN — HEPARIN SODIUM 1800 UNIT(S)/HR: 1000 INJECTION INTRAVENOUS; SUBCUTANEOUS at 05:01

## 2025-05-31 RX ADMIN — HEPARIN SODIUM 1500 UNIT(S)/HR: 1000 INJECTION INTRAVENOUS; SUBCUTANEOUS at 20:32

## 2025-05-31 RX ADMIN — HEPARIN SODIUM 8000 UNIT(S): 1000 INJECTION INTRAVENOUS; SUBCUTANEOUS at 05:01

## 2025-05-31 RX ADMIN — IPRATROPIUM BROMIDE AND ALBUTEROL SULFATE 3 MILLILITER(S): .5; 2.5 SOLUTION RESPIRATORY (INHALATION) at 21:18

## 2025-05-31 RX ADMIN — METOPROLOL SUCCINATE 25 MILLIGRAM(S): 50 TABLET, EXTENDED RELEASE ORAL at 17:27

## 2025-05-31 RX ADMIN — ESCITALOPRAM OXALATE 10 MILLIGRAM(S): 20 TABLET ORAL at 17:28

## 2025-05-31 RX ADMIN — Medication 400 MILLIGRAM(S): at 05:02

## 2025-05-31 RX ADMIN — HEPARIN SODIUM 1800 UNIT(S)/HR: 1000 INJECTION INTRAVENOUS; SUBCUTANEOUS at 06:19

## 2025-05-31 RX ADMIN — HEPARIN SODIUM 1500 UNIT(S)/HR: 1000 INJECTION INTRAVENOUS; SUBCUTANEOUS at 19:15

## 2025-05-31 RX ADMIN — Medication 1000 MILLIGRAM(S): at 07:32

## 2025-05-31 RX ADMIN — Medication 100 MILLIGRAM(S): at 17:28

## 2025-05-31 RX ADMIN — HEPARIN SODIUM 0 UNIT(S)/HR: 1000 INJECTION INTRAVENOUS; SUBCUTANEOUS at 13:01

## 2025-05-31 RX ADMIN — HEPARIN SODIUM 1500 UNIT(S)/HR: 1000 INJECTION INTRAVENOUS; SUBCUTANEOUS at 14:00

## 2025-05-31 RX ADMIN — CEFTRIAXONE 100 MILLIGRAM(S): 500 INJECTION, POWDER, FOR SOLUTION INTRAMUSCULAR; INTRAVENOUS at 23:53

## 2025-05-31 RX ADMIN — CLOPIDOGREL BISULFATE 75 MILLIGRAM(S): 75 TABLET, FILM COATED ORAL at 17:27

## 2025-05-31 RX ADMIN — ROSUVASTATIN CALCIUM 10 MILLIGRAM(S): 20 TABLET, FILM COATED ORAL at 21:17

## 2025-05-31 RX ADMIN — LEVALBUTEROL HYDROCHLORIDE 0.63 MILLIGRAM(S): 1.25 SOLUTION RESPIRATORY (INHALATION) at 23:00

## 2025-05-31 RX ADMIN — Medication 25 MILLIGRAM(S): at 17:27

## 2025-05-31 RX ADMIN — FUROSEMIDE 40 MILLIGRAM(S): 10 INJECTION INTRAMUSCULAR; INTRAVENOUS at 17:28

## 2025-05-31 NOTE — PHYSICAL THERAPY INITIAL EVALUATION ADULT - NSPTDISCHREC_GEN_A_CORE
If pt d/c home would require home PT, assist with all mobility, & DME: RW, polyfly w/c, & 3:1 commode./[facility]

## 2025-05-31 NOTE — PHYSICAL THERAPY INITIAL EVALUATION ADULT - ADDITIONAL COMMENTS
PTA pt required some assist with functional mobility and ADLs with RW. pt lives at the [facility]

## 2025-05-31 NOTE — ED PROVIDER NOTE - CLINICAL SUMMARY MEDICAL DECISION MAKING FREE TEXT BOX
ANDREE Frausto PGY2- patient here with 3 days generalized weakness, unable to walk. today with 3 falls due to weakness, no obvious injury, with old appearing laceration to posterior scalp. denies pain. afebrile here. 3+ pitting edema bilateral, RLE appears larger than LLE. will obtain labs, ekg, cxr, UA, RLE US to eval for DVT. ANDREE Frausto PGY2- patient here with 3 days generalized weakness, unable to walk. today with 3 falls due to weakness, no obvious injury, with old appearing laceration to posterior scalp. denies pain. afebrile here. 3+ pitting edema bilateral, RLE appears larger than LLE. will obtain labs, ekg, cxr, UA, RLE US to eval for DVT.    DANIEL Galvez MD: Agree with resident/ACP MDM, assessment and plan as above.

## 2025-05-31 NOTE — CHART NOTE - NSCHARTNOTEFT_GEN_A_CORE
MEDICINE NP    JASMEET VALLADARES  84y Male    Patient is a 84y old  Male who presents with a chief complaint of weakness and DVT (31 May 2025 12:52)       > Event Summary:  Notified by RN, Patient with         -Vital Signs Last 24 Hrs  T(C): 38.6 (31 May 2025 22:51), Max: 38.6 (31 May 2025 22:51)  T(F): 101.5 (31 May 2025 22:51), Max: 101.5 (31 May 2025 22:51)  HR: 123 (31 May 2025 22:10) (56 - 123)  BP: 116/72 (31 May 2025 22:10) (96/54 - 163/76)  BP(mean): 71 (31 May 2025 03:38) (71 - 71)  RR: 24 (31 May 2025 22:10) (18 - 24)  SpO2: 94% (31 May 2025 22:10) (92% - 95%)    Parameters below as of 31 May 2025 22:10  Patient On (Oxygen Delivery Method): nasal cannula  O2 Flow (L/min): 6      > PHYSICAL EXAM:   General: Awake, A&Ox3, nonfocal  CV: +S1S2, RRR.  +Tachycardia.  + 2 Peripheral edema  Respiratory: Even, unlabored.  +Tachypnea RR 24.  Lungs diminished right side.    Abdomen:  +BS.  Soft, NT, ND.  No palpable mass  MSK: BALLESTEROS x4.   Skin: Face flushed.  +Feverish to touch.             > Assessment & Plan:  HPI:  83 y/o M with past medical history of HTN, HLD, CAD with Stents on DAPT, CHF, BIB EMS from the [facility] for evaluation s/p fall.  Per the granddaughter, patient has had multiple falls over the past few months (usually from sliding out of bed). Over the course of the past week, patient has had a new cough and resultant weakness and unsteadiness. The day prior to presentation, patient had a fall while ambulating with a walker. Family saw the patient that day and noted no new symptoms (f/chills/SOB/dysuria/worsened LE edema). After they left he had 2 more falls so presented to the ED.   In the ED afeb hds, labs notable for Cr 1.8 (unclear baseline) proBNP 2933. UA negative. CXR without infiltrate. Duplex with RLE DVT. started on hep gtt.  (31 May 2025 12:52).  Admitted with RLE DVT on Heparin gtt.  Now with Fever, Hypoxia      1.  AHRF - r/o Infection given fever vs PE +/- HF  -Duonebs x1 with some improvement of sob.    -f/u STAT CXR  -O2 NC - Keep SpO2 > 92 %  -F/u ABG  -c/w Heparin gtt  -Patient s/p Lasix 40mg iv @ 5:28PM - voiding.  Monitor I&O   -TTE - Pending     2.  Fever - r/o Infection  -Tylenol iv now and Cooling measures prn  -BCX x2 ordered  -Rpt CXR  -Trend Wbc and fever curve  -FluA/FluB/RSV/COVID PCR (05.31.25 @ 14:15) NotDetec          3. Tachycardia - Likely iso Fever vs  -ECG :  bpm.    -STAT BMP / Lytes   -Telemetry  -Will treat fever and monitor         DAJUAN Guerrero-BC  Medicine Department MEDICINE NP    JASMEET VALLADARES  84y Male    Patient is a 84y old  Male who presents with a chief complaint of weakness and DVT (31 May 2025 12:52)       > Event Summary:  Notified by RN, Patient with c/o SOB, SpO2 88% on RA and placed on 2LNC.  Patient seen at bedside, reports SOB, denies chest pain, palpitations, abdominal pain, fever or chills.  Resp tachypnic - RR 24, SpO2 92%.  O2 inc to 4LNC   Face appears flushed and skin feverish to touch -  Rectal temp 101.5       -Vital Signs Last 24 Hrs  T(C): 38.6 (31 May 2025 22:51), Max: 38.6 (31 May 2025 22:51)  T(F): 101.5 (31 May 2025 22:51), Max: 101.5 (31 May 2025 22:51)  HR: 123 (31 May 2025 22:10) (56 - 123)  BP: 116/72 (31 May 2025 22:10) (96/54 - 163/76)  BP(mean): 71 (31 May 2025 03:38) (71 - 71)  RR: 24 (31 May 2025 22:10) (18 - 24)  SpO2: 94% (31 May 2025 22:10) (92% - 95%)    Parameters below as of 31 May 2025 22:10  Patient On (Oxygen Delivery Method): nasal cannula  O2 Flow (L/min): 6      > PHYSICAL EXAM:   General: Awake, A&Ox3, nonfocal  CV: +S1S2, RRR.  +Tachycardia.  + 2 Peripheral edema  Respiratory: Even, unlabored.  +Tachypnea RR 24.  Lungs diminished right side.    Abdomen:  +BS.  Soft, NT, ND.  No palpable mass  MSK: BALLESTEROS x4.   Skin: Face flushed.  +Feverish to touch.             > Assessment & Plan:  HPI:  85 y/o M with past medical history of HTN, HLD, CAD with Stents on DAPT, CHF, BIB EMS from the [facility] for evaluation s/p fall.  Per the granddaughter, patient has had multiple falls over the past few months (usually from sliding out of bed). Over the course of the past week, patient has had a new cough and resultant weakness and unsteadiness. The day prior to presentation, patient had a fall while ambulating with a walker. Family saw the patient that day and noted no new symptoms (f/chills/SOB/dysuria/worsened LE edema). After they left he had 2 more falls so presented to the ED.   In the ED afeb hds, labs notable for Cr 1.8 (unclear baseline) proBNP 2933. UA negative. CXR without infiltrate. Duplex with RLE DVT. started on hep gtt.  (31 May 2025 12:52).  Admitted with RLE DVT on Heparin gtt.  Now with Fever, Hypoxia, and Tachycardia.        1.  AHRF - r/o Infection given fever vs PE +/- HF  -Duonebs x1 with some improvement of sob.  Will c/w Xopenex given Tachycardia  -f/u STAT CXR  -O2 NC - Keep SpO2 > 92 %. Continuous pulse ox.   -F/u ABG  -c/w Heparin gtt for RLE DVT  -Patient s/p Lasix 40mg iv @ 5:28PM - voiding.  Monitor I&O   -TTE - Pending     2.  Fever - r/o Infection  -Tylenol iv now and Cooling measures prn  -BCX x2 ordered  -Rpt CXR as above  -Trend Wbc and fever curve  -FluA/FluB/RSV/COVID PCR (05.31.25 @ 14:15) NotDetec          3. Tachycardia - Likely iso Fever vs ?PE   -ECG :  bpm.    -STAT BMP / Lytes   -Telemetry  -Will treat fever and monitor     -D/w [facility], Dr. Quezada, CXR reviewed, concern for RLL consolidation, recommends CTX and Azithromycin, c/w Hep gtt, VQ Scan r/o PE given MATILDA, and c/w Heparin gtt.  PERT Team cs if decompensate.      Sylvie Woods, DAJUAN-BC  Medicine Department  #[id number]

## 2025-05-31 NOTE — ED PROVIDER NOTE - PHYSICAL EXAMINATION
General: no acute distress  Psych: mood appropriate  Head: normocephalic; small superficial laceration to posterior scalp, appears old and healing  Eyes: conjunctivae clear bilaterally, sclerae anicteric  ENT: no nasal flaring, patent nares  Cardio: regular rate and rhythm; normal heart sounds  Resp: clear to auscultation bilaterally  GI: abdomen soft, nontender, nondistended  Neuro: strength equal in all extremities, sensation intact  Skin: no rashes or bruising noted  MSK: bilateral LE swelling, RLE>LLE  Lymph/Vasc: 3+ bilateral pitting edema

## 2025-05-31 NOTE — PROVIDER CONTACT NOTE (SEPSIS SCREENING) - SEPSIS CRITERIA TO COINCIDE WITH MEWS
Heart Rate greater than 90/Respiratory Rate greater than 20/Temperature less than 96.8 or greater than 100.4

## 2025-05-31 NOTE — H&P ADULT - PROBLEM SELECTOR PLAN 3
acute popliteal, started on hep gtt  -continue hep for now, once Cr stable, will try to transition to DOAC, discussed risk/benefits with family  -aspirin d/gloria

## 2025-05-31 NOTE — PATIENT PROFILE ADULT - FALL HARM RISK - HARM RISK INTERVENTIONS

## 2025-05-31 NOTE — H&P ADULT - NSICDXPASTMEDICALHX_GEN_ALL_CORE_FT
PAST MEDICAL HISTORY:  CAD (coronary artery disease)     Chronic diastolic congestive heart failure

## 2025-05-31 NOTE — H&P ADULT - PROBLEM SELECTOR PLAN 1
Recurrent over the past few months, although appears to have worsened this week. c/f infectious process. UA clean. Has had ongoing cough c/f viral illness  -f/u RVP  -PT  -holding fadi gabapentin (600mg TID), pending mental status tomorrow consider restarting at lower dose Recurrent over the past few months, although appears to have worsened this week. c/f infectious process. UA clean. Has had ongoing cough c/f viral illness  -f/u RVP  -PT  -holding home gabapentin (600mg TID), pending mental status tomorrow consider restarting at lower dose

## 2025-05-31 NOTE — H&P ADULT - HISTORY OF PRESENT ILLNESS
85 y/o M with past medical history of HTN, HLD, CAD with stents on DAPT, CHF presenting from the [facility] with falls. Per the granddaughter, patient has had multiple falls over the past few months (usually from sliding out of bed). Over the course of the past week, patient has had a new cough and resultant weakness and unsteadiness. The day prior to presentation, patient had a fall while ambulating with a walker. Family saw the patient that day and noted no new symptoms (f/chills/SOB/dysuria/worsened LE edema). After they left he had 2 more falls so presented to the ED.     In the ED afeb hds, labs notable for Cr 1.8 (unclear baseline) proBNP 2933. UA negative. CXR 83 y/o M with past medical history of HTN, HLD, CAD with stents on DAPT, CHF presenting from the [facility] with falls. Per the granddaughter, patient has had multiple falls over the past few months (usually from sliding out of bed). Over the course of the past week, patient has had a new cough and resultant weakness and unsteadiness. The day prior to presentation, patient had a fall while ambulating with a walker. Family saw the patient that day and noted no new symptoms (f/chills/SOB/dysuria/worsened LE edema). After they left he had 2 more falls so presented to the ED.     In the ED afeb hds, labs notable for Cr 1.8 (unclear baseline) proBNP 2933. UA negative. CXR without infiltrate. Duplex with RLE DVT. started on hep gtt.

## 2025-05-31 NOTE — PHYSICAL THERAPY INITIAL EVALUATION ADULT - PERTINENT HX OF CURRENT PROBLEM, REHAB EVAL
83 y/o M with past medical history of HTN, HLD, CAD with stents on DAPT, CHF, BIB EMS from the [facility] for evaluation s/p fall. Per EMS, staff at [facility] said patient fell three times today, was more confused than usual, though still A&Ox2-3 which is baseline. Patient endorses falling 3 times today, states the first time he fell out of bed, the other two times tried to stand up but fell onto his buttocks because he was too weak. Notes has not been able to walk as normal for the past 3 days because he has felt weak. Admit  Dx  Left  Leg  DVT  - Started  on  heparin  gtt  S/P  Fall  : CTH- Neg

## 2025-05-31 NOTE — ED PROVIDER NOTE - DIFFERENTIAL DIAGNOSIS
Differential Diagnosis Ddx includes, however, is not limited to: ICH, infectious process, metabolic process, other

## 2025-05-31 NOTE — H&P ADULT - PROBLEM SELECTOR PLAN 4
f/u with [facility] outpatient. Unclear if reduced or preserved EF. Has struggled with chronic LE edema, unclear how much is 2/2 to HF vs Chronic venous insufficieny (per outpatient notes appears more likely the later)   -Lasix 40 IV *1 today  -on torsemide 40mg outpatient, pending cr tomorrow, will restart  -continue spironolactone 25mg daily  -continue metop 25mg daily  -previously on ace, off currently  -TTE

## 2025-05-31 NOTE — ED PROVIDER NOTE - OBJECTIVE STATEMENT
The patient is an 85 y/o M with past medical history of HTN, HLD, CAD with stents on DAPT, CHF, BIB EMS from the [facility] for evaluation s/p fall. Per EMS, staff at [facility] said patient fell three times today, was more confused than usual, though still A&Ox2-3 which is baseline. Patient endorses falling 3 times today, states the first time he fell out of bed, the other two times tried to stand up but fell onto his buttocks because he was too weak. Notes has not been able to walk as normal for the past 3 days because he has felt weak. Denies any head strike or LOC. No fever, chest pain, shortness of breath, abd pain, nausea, vomiting, diarrhea, dysuria, or any other symptoms.

## 2025-06-01 DIAGNOSIS — J96.01 ACUTE RESPIRATORY FAILURE WITH HYPOXIA: ICD-10-CM

## 2025-06-01 DIAGNOSIS — R50.9 FEVER, UNSPECIFIED: ICD-10-CM

## 2025-06-01 LAB
ADD ON TEST-SPECIMEN IN LAB: SIGNIFICANT CHANGE UP
ANION GAP SERPL CALC-SCNC: 14 MMOL/L — SIGNIFICANT CHANGE UP (ref 5–17)
ANION GAP SERPL CALC-SCNC: 19 MMOL/L — HIGH (ref 5–17)
APTT BLD: 60.2 SEC — HIGH (ref 26.1–36.8)
BASE EXCESS BLDA CALC-SCNC: 6.2 MMOL/L — HIGH (ref -2–3)
BUN SERPL-MCNC: 29 MG/DL — HIGH (ref 7–23)
BUN SERPL-MCNC: 30 MG/DL — HIGH (ref 7–23)
CALCIUM SERPL-MCNC: 9.1 MG/DL — SIGNIFICANT CHANGE UP (ref 8.4–10.5)
CALCIUM SERPL-MCNC: 9.4 MG/DL — SIGNIFICANT CHANGE UP (ref 8.4–10.5)
CHLORIDE SERPL-SCNC: 94 MMOL/L — LOW (ref 96–108)
CHLORIDE SERPL-SCNC: 97 MMOL/L — SIGNIFICANT CHANGE UP (ref 96–108)
CO2 BLDA-SCNC: 31 MMOL/L — HIGH (ref 19–24)
CO2 SERPL-SCNC: 22 MMOL/L — SIGNIFICANT CHANGE UP (ref 22–31)
CO2 SERPL-SCNC: 26 MMOL/L — SIGNIFICANT CHANGE UP (ref 22–31)
CREAT SERPL-MCNC: 1.26 MG/DL — SIGNIFICANT CHANGE UP (ref 0.5–1.3)
CREAT SERPL-MCNC: 1.33 MG/DL — HIGH (ref 0.5–1.3)
CULTURE RESULTS: SIGNIFICANT CHANGE UP
EGFR: 53 ML/MIN/1.73M2 — LOW
EGFR: 53 ML/MIN/1.73M2 — LOW
EGFR: 57 ML/MIN/1.73M2 — LOW
EGFR: 57 ML/MIN/1.73M2 — LOW
GAS PNL BLDA: SIGNIFICANT CHANGE UP
GLUCOSE SERPL-MCNC: 153 MG/DL — HIGH (ref 70–99)
GLUCOSE SERPL-MCNC: 182 MG/DL — HIGH (ref 70–99)
HCO3 BLDA-SCNC: 30 MMOL/L — HIGH (ref 21–28)
HCT VFR BLD CALC: 41.4 % — SIGNIFICANT CHANGE UP (ref 39–50)
HCT VFR BLD CALC: 43.4 % — SIGNIFICANT CHANGE UP (ref 39–50)
HGB BLD-MCNC: 14.2 G/DL — SIGNIFICANT CHANGE UP (ref 13–17)
HGB BLD-MCNC: 14.5 G/DL — SIGNIFICANT CHANGE UP (ref 13–17)
HOROWITZ INDEX BLDA+IHG-RTO: 45 — SIGNIFICANT CHANGE UP
MAGNESIUM SERPL-MCNC: 2.1 MG/DL — SIGNIFICANT CHANGE UP (ref 1.6–2.6)
MCHC RBC-ENTMCNC: 31.9 PG — SIGNIFICANT CHANGE UP (ref 27–34)
MCHC RBC-ENTMCNC: 32.6 PG — SIGNIFICANT CHANGE UP (ref 27–34)
MCHC RBC-ENTMCNC: 33.4 G/DL — SIGNIFICANT CHANGE UP (ref 32–36)
MCHC RBC-ENTMCNC: 34.3 G/DL — SIGNIFICANT CHANGE UP (ref 32–36)
MCV RBC AUTO: 95 FL — SIGNIFICANT CHANGE UP (ref 80–100)
MCV RBC AUTO: 95.4 FL — SIGNIFICANT CHANGE UP (ref 80–100)
NRBC BLD AUTO-RTO: 0 /100 WBCS — SIGNIFICANT CHANGE UP (ref 0–0)
NRBC BLD AUTO-RTO: 0 /100 WBCS — SIGNIFICANT CHANGE UP (ref 0–0)
PCO2 BLDA: 37 MMHG — SIGNIFICANT CHANGE UP (ref 35–48)
PH BLDA: 7.51 — HIGH (ref 7.35–7.45)
PHOSPHATE SERPL-MCNC: 2.9 MG/DL — SIGNIFICANT CHANGE UP (ref 2.5–4.5)
PLATELET # BLD AUTO: 119 K/UL — LOW (ref 150–400)
PLATELET # BLD AUTO: 167 K/UL — SIGNIFICANT CHANGE UP (ref 150–400)
PO2 BLDA: 94 MMHG — SIGNIFICANT CHANGE UP (ref 83–108)
POTASSIUM SERPL-MCNC: 3.4 MMOL/L — LOW (ref 3.5–5.3)
POTASSIUM SERPL-MCNC: 3.4 MMOL/L — LOW (ref 3.5–5.3)
POTASSIUM SERPL-SCNC: 3.4 MMOL/L — LOW (ref 3.5–5.3)
POTASSIUM SERPL-SCNC: 3.4 MMOL/L — LOW (ref 3.5–5.3)
RBC # BLD: 4.36 M/UL — SIGNIFICANT CHANGE UP (ref 4.2–5.8)
RBC # BLD: 4.55 M/UL — SIGNIFICANT CHANGE UP (ref 4.2–5.8)
RBC # FLD: 12.9 % — SIGNIFICANT CHANGE UP (ref 10.3–14.5)
RBC # FLD: 12.9 % — SIGNIFICANT CHANGE UP (ref 10.3–14.5)
SAO2 % BLDA: 98.8 % — HIGH (ref 94–98)
SODIUM SERPL-SCNC: 135 MMOL/L — SIGNIFICANT CHANGE UP (ref 135–145)
SODIUM SERPL-SCNC: 137 MMOL/L — SIGNIFICANT CHANGE UP (ref 135–145)
SPECIMEN SOURCE: SIGNIFICANT CHANGE UP
WBC # BLD: 4.69 K/UL — SIGNIFICANT CHANGE UP (ref 3.8–10.5)
WBC # BLD: 5.97 K/UL — SIGNIFICANT CHANGE UP (ref 3.8–10.5)
WBC # FLD AUTO: 4.69 K/UL — SIGNIFICANT CHANGE UP (ref 3.8–10.5)
WBC # FLD AUTO: 5.97 K/UL — SIGNIFICANT CHANGE UP (ref 3.8–10.5)

## 2025-06-01 PROCEDURE — 99233 SBSQ HOSP IP/OBS HIGH 50: CPT

## 2025-06-01 RX ORDER — AZITHROMYCIN 250 MG
500 CAPSULE ORAL
Refills: 0 | Status: DISCONTINUED | OUTPATIENT
Start: 2025-06-01 | End: 2025-06-03

## 2025-06-01 RX ORDER — TORSEMIDE 10 MG
40 TABLET ORAL DAILY
Refills: 0 | Status: DISCONTINUED | OUTPATIENT
Start: 2025-06-01 | End: 2025-06-06

## 2025-06-01 RX ORDER — DEXTROMETHORPHAN HBR, GUAIFENESIN 200 MG/10ML
200 LIQUID ORAL ONCE
Refills: 0 | Status: COMPLETED | OUTPATIENT
Start: 2025-06-01 | End: 2025-06-01

## 2025-06-01 RX ORDER — AZITHROMYCIN 250 MG
500 CAPSULE ORAL ONCE
Refills: 0 | Status: COMPLETED | OUTPATIENT
Start: 2025-06-01 | End: 2025-06-01

## 2025-06-01 RX ORDER — AZITHROMYCIN 250 MG
500 CAPSULE ORAL EVERY 24 HOURS
Refills: 0 | Status: DISCONTINUED | OUTPATIENT
Start: 2025-06-02 | End: 2025-06-03

## 2025-06-01 RX ADMIN — ESCITALOPRAM OXALATE 10 MILLIGRAM(S): 20 TABLET ORAL at 13:09

## 2025-06-01 RX ADMIN — CEFTRIAXONE 100 MILLIGRAM(S): 500 INJECTION, POWDER, FOR SOLUTION INTRAMUSCULAR; INTRAVENOUS at 22:55

## 2025-06-01 RX ADMIN — MIRABEGRON 25 MILLIGRAM(S): 50 TABLET, FILM COATED, EXTENDED RELEASE ORAL at 18:33

## 2025-06-01 RX ADMIN — LEVALBUTEROL HYDROCHLORIDE 0.63 MILLIGRAM(S): 1.25 SOLUTION RESPIRATORY (INHALATION) at 05:26

## 2025-06-01 RX ADMIN — Medication 250 MILLIGRAM(S): at 04:24

## 2025-06-01 RX ADMIN — Medication 25 MILLIGRAM(S): at 05:26

## 2025-06-01 RX ADMIN — ROSUVASTATIN CALCIUM 10 MILLIGRAM(S): 20 TABLET, FILM COATED ORAL at 22:55

## 2025-06-01 RX ADMIN — Medication 100 MILLIGRAM(S): at 13:09

## 2025-06-01 RX ADMIN — HEPARIN SODIUM 1500 UNIT(S)/HR: 1000 INJECTION INTRAVENOUS; SUBCUTANEOUS at 19:32

## 2025-06-01 RX ADMIN — HEPARIN SODIUM 1500 UNIT(S)/HR: 1000 INJECTION INTRAVENOUS; SUBCUTANEOUS at 10:48

## 2025-06-01 RX ADMIN — LEVALBUTEROL HYDROCHLORIDE 0.63 MILLIGRAM(S): 1.25 SOLUTION RESPIRATORY (INHALATION) at 18:33

## 2025-06-01 RX ADMIN — HEPARIN SODIUM 1500 UNIT(S)/HR: 1000 INJECTION INTRAVENOUS; SUBCUTANEOUS at 07:19

## 2025-06-01 RX ADMIN — LEVALBUTEROL HYDROCHLORIDE 0.63 MILLIGRAM(S): 1.25 SOLUTION RESPIRATORY (INHALATION) at 13:10

## 2025-06-01 RX ADMIN — Medication 20 MILLIEQUIVALENT(S): at 02:44

## 2025-06-01 RX ADMIN — DEXTROMETHORPHAN HBR, GUAIFENESIN 200 MILLIGRAM(S): 200 LIQUID ORAL at 05:26

## 2025-06-01 RX ADMIN — METOPROLOL SUCCINATE 25 MILLIGRAM(S): 50 TABLET, EXTENDED RELEASE ORAL at 05:26

## 2025-06-01 RX ADMIN — CLOPIDOGREL BISULFATE 75 MILLIGRAM(S): 75 TABLET, FILM COATED ORAL at 13:09

## 2025-06-01 RX ADMIN — Medication 20 MILLIEQUIVALENT(S): at 04:37

## 2025-06-01 RX ADMIN — LINACLOTIDE 72 MICROGRAM(S): 290 CAPSULE, GELATIN COATED ORAL at 05:26

## 2025-06-01 RX ADMIN — Medication 40 MILLIGRAM(S): at 09:33

## 2025-06-01 RX ADMIN — HEPARIN SODIUM 1500 UNIT(S)/HR: 1000 INJECTION INTRAVENOUS; SUBCUTANEOUS at 04:41

## 2025-06-01 NOTE — PROGRESS NOTE ADULT - SUBJECTIVE AND OBJECTIVE BOX
[facility] Division of Hospital Medicine  Ingrid Bone DO  Available on Teams Tyesha    Patient is a 84y old  Male who presents with a chief complaint of weakness and DVT (31 May 2025 12:52)      SUBJECTIVE / OVERNIGHT EVENTS: Febrile overnight. Denies shortness of breath but was hypoxic and is currently requiring 6L NC. Denies cough, phlegm production. Wants to go home.  ADDITIONAL REVIEW OF SYSTEMS: negative    MEDICATIONS  (STANDING):  allopurinol 100 milliGRAM(s) Oral daily  azithromycin  IVPB 500     cefTRIAXone   IVPB      cefTRIAXone   IVPB 1000 milliGRAM(s) IV Intermittent every 24 hours  clopidogrel Tablet 75 milliGRAM(s) Oral daily  escitalopram 10 milliGRAM(s) Oral daily  heparin  Infusion.  Unit(s)/Hr (18 mL/Hr) IV Continuous <Continuous>  levalbuterol Inhalation 0.63 milliGRAM(s) Inhalation every 6 hours  linaclotide 72 MICROGram(s) Oral before breakfast  metoprolol succinate ER 25 milliGRAM(s) Oral daily  mirabegron ER 25 milliGRAM(s) Oral daily  rosuvastatin 10 milliGRAM(s) Oral at bedtime  spironolactone 25 milliGRAM(s) Oral daily  torsemide 40 milliGRAM(s) Oral daily    MEDICATIONS  (PRN):  heparin   Injectable 8000 Unit(s) IV Push every 6 hours PRN For aPTT less than 40  heparin   Injectable 4000 Unit(s) IV Push every 6 hours PRN For aPTT between 40 - 57      CAPILLARY BLOOD GLUCOSE        I&O's Summary    31 May 2025 07:01  -  01 Jun 2025 07:00  --------------------------------------------------------  IN: 0 mL / OUT: 1650 mL / NET: -1650 mL        PHYSICAL EXAM:  Vital Signs Last 24 Hrs  T(C): 37.2 (01 Jun 2025 11:53), Max: 38.6 (31 May 2025 22:51)  T(F): 98.9 (01 Jun 2025 11:53), Max: 101.5 (31 May 2025 22:51)  HR: 80 (01 Jun 2025 11:53) (63 - 123)  BP: 138/82 (01 Jun 2025 11:53) (101/65 - 163/76)  BP(mean): --  RR: 16 (01 Jun 2025 11:53) (16 - 24)  SpO2: 94% (01 Jun 2025 11:53) (91% - 95%)    Parameters below as of 01 Jun 2025 11:53  Patient On (Oxygen Delivery Method): nasal cannula        CONSTITUTIONAL: Well-groomed, in no apparent distress, elderly  EYES: No conjunctival or scleral injection, non-icteric; PERRLA and symmetric  ENMT: No external nasal lesions; no pharyngeal injection or exudates, oral mucosa with moist membranes  RESPIRATORY: Breathing comfortably; lungs w/decreased breath sounds  CARDIOVASCULAR: +S1S2, RRR, no M/G/R; pedal pulses full and symmetric; no lower extremity edema  GASTROINTESTINAL: No palpable masses or tenderness, +BS throughout, no rebound/guarding  NEUROLOGIC: CN II-XII intact; sensation intact in LEs b/l to light touch  PSYCHIATRIC: A+O x 2-3; mood and affect appropriate; appropriate insight and judgment. Knows that it's 2025, lives at [facility].     LABS:                        14.2   4.69  )-----------( 119      ( 01 Jun 2025 04:24 )             41.4     06-01    137  |  97  |  29[H]  ----------------------------<  153[H]  3.4[L]   |  26  |  1.26    Ca    9.1      01 Jun 2025 04:24  Phos  2.9     06-01  Mg     2.1     06-01    TPro  6.9  /  Alb  3.9  /  TBili  0.8  /  DBili  x   /  AST  25  /  ALT  14  /  AlkPhos  71  05-30    PT/INR - ( 31 May 2025 04:57 )   PT: 12.8 sec;   INR: 1.12 ratio         PTT - ( 01 Jun 2025 04:24 )  PTT:60.2 sec      Urinalysis Basic - ( 01 Jun 2025 04:24 )    Color: x / Appearance: x / SG: x / pH: x  Gluc: 153 mg/dL / Ketone: x  / Bili: x / Urobili: x   Blood: x / Protein: x / Nitrite: x   Leuk Esterase: x / RBC: x / WBC x   Sq Epi: x / Non Sq Epi: x / Bacteria: x   [facility] Division of Hospital Medicine  Ingrid Bone DO  Available on Teams Tyesha    Patient is a 84y old  Male who presents with a chief complaint of weakness and DVT (31 May 2025 12:52)    telemetry - no events    SUBJECTIVE / OVERNIGHT EVENTS: Febrile overnight. Denies shortness of breath but was hypoxic and is currently requiring 6L NC. Denies cough, phlegm production. Wants to go home.  ADDITIONAL REVIEW OF SYSTEMS: negative    MEDICATIONS  (STANDING):  allopurinol 100 milliGRAM(s) Oral daily  azithromycin  IVPB 500     cefTRIAXone   IVPB      cefTRIAXone   IVPB 1000 milliGRAM(s) IV Intermittent every 24 hours  clopidogrel Tablet 75 milliGRAM(s) Oral daily  escitalopram 10 milliGRAM(s) Oral daily  heparin  Infusion.  Unit(s)/Hr (18 mL/Hr) IV Continuous <Continuous>  levalbuterol Inhalation 0.63 milliGRAM(s) Inhalation every 6 hours  linaclotide 72 MICROGram(s) Oral before breakfast  metoprolol succinate ER 25 milliGRAM(s) Oral daily  mirabegron ER 25 milliGRAM(s) Oral daily  rosuvastatin 10 milliGRAM(s) Oral at bedtime  spironolactone 25 milliGRAM(s) Oral daily  torsemide 40 milliGRAM(s) Oral daily    MEDICATIONS  (PRN):  heparin   Injectable 8000 Unit(s) IV Push every 6 hours PRN For aPTT less than 40  heparin   Injectable 4000 Unit(s) IV Push every 6 hours PRN For aPTT between 40 - 57      CAPILLARY BLOOD GLUCOSE        I&O's Summary    31 May 2025 07:01  -  01 Jun 2025 07:00  --------------------------------------------------------  IN: 0 mL / OUT: 1650 mL / NET: -1650 mL        PHYSICAL EXAM:  Vital Signs Last 24 Hrs  T(C): 37.2 (01 Jun 2025 11:53), Max: 38.6 (31 May 2025 22:51)  T(F): 98.9 (01 Jun 2025 11:53), Max: 101.5 (31 May 2025 22:51)  HR: 80 (01 Jun 2025 11:53) (63 - 123)  BP: 138/82 (01 Jun 2025 11:53) (101/65 - 163/76)  BP(mean): --  RR: 16 (01 Jun 2025 11:53) (16 - 24)  SpO2: 94% (01 Jun 2025 11:53) (91% - 95%)    Parameters below as of 01 Jun 2025 11:53  Patient On (Oxygen Delivery Method): nasal cannula        CONSTITUTIONAL: Well-groomed, in no apparent distress, elderly  EYES: No conjunctival or scleral injection, non-icteric; PERRLA and symmetric  ENMT: No external nasal lesions; no pharyngeal injection or exudates, oral mucosa with moist membranes  RESPIRATORY: Breathing comfortably; lungs w/decreased breath sounds  CARDIOVASCULAR: +S1S2, RRR, no M/G/R; pedal pulses full and symmetric; no lower extremity edema  GASTROINTESTINAL: No palpable masses or tenderness, +BS throughout, no rebound/guarding  NEUROLOGIC: CN II-XII intact; sensation intact in LEs b/l to light touch  PSYCHIATRIC: A+O x 2-3; mood and affect appropriate; appropriate insight and judgment. Knows that it's 2025, lives at [facility].     LABS:                        14.2   4.69  )-----------( 119      ( 01 Jun 2025 04:24 )             41.4     06-01    137  |  97  |  29[H]  ----------------------------<  153[H]  3.4[L]   |  26  |  1.26    Ca    9.1      01 Jun 2025 04:24  Phos  2.9     06-01  Mg     2.1     06-01    TPro  6.9  /  Alb  3.9  /  TBili  0.8  /  DBili  x   /  AST  25  /  ALT  14  /  AlkPhos  71  05-30    PT/INR - ( 31 May 2025 04:57 )   PT: 12.8 sec;   INR: 1.12 ratio         PTT - ( 01 Jun 2025 04:24 )  PTT:60.2 sec      Urinalysis Basic - ( 01 Jun 2025 04:24 )    Color: x / Appearance: x / SG: x / pH: x  Gluc: 153 mg/dL / Ketone: x  / Bili: x / Urobili: x   Blood: x / Protein: x / Nitrite: x   Leuk Esterase: x / RBC: x / WBC x   Sq Epi: x / Non Sq Epi: x / Bacteria: x

## 2025-06-02 LAB
ANION GAP SERPL CALC-SCNC: 15 MMOL/L — SIGNIFICANT CHANGE UP (ref 5–17)
APTT BLD: 51.3 SEC — HIGH (ref 26.1–36.8)
APTT BLD: 61.8 SEC — HIGH (ref 26.1–36.8)
APTT BLD: 64.8 SEC — HIGH (ref 26.1–36.8)
BUN SERPL-MCNC: 29 MG/DL — HIGH (ref 7–23)
CALCIUM SERPL-MCNC: 9.2 MG/DL — SIGNIFICANT CHANGE UP (ref 8.4–10.5)
CHLORIDE SERPL-SCNC: 96 MMOL/L — SIGNIFICANT CHANGE UP (ref 96–108)
CO2 SERPL-SCNC: 27 MMOL/L — SIGNIFICANT CHANGE UP (ref 22–31)
CREAT SERPL-MCNC: 1.16 MG/DL — SIGNIFICANT CHANGE UP (ref 0.5–1.3)
EGFR: 63 ML/MIN/1.73M2 — SIGNIFICANT CHANGE UP
EGFR: 63 ML/MIN/1.73M2 — SIGNIFICANT CHANGE UP
GLUCOSE SERPL-MCNC: 134 MG/DL — HIGH (ref 70–99)
HCT VFR BLD CALC: 40.2 % — SIGNIFICANT CHANGE UP (ref 39–50)
HGB BLD-MCNC: 13.5 G/DL — SIGNIFICANT CHANGE UP (ref 13–17)
MAGNESIUM SERPL-MCNC: 2.3 MG/DL — SIGNIFICANT CHANGE UP (ref 1.6–2.6)
MCHC RBC-ENTMCNC: 32.3 PG — SIGNIFICANT CHANGE UP (ref 27–34)
MCHC RBC-ENTMCNC: 33.6 G/DL — SIGNIFICANT CHANGE UP (ref 32–36)
MCV RBC AUTO: 96.2 FL — SIGNIFICANT CHANGE UP (ref 80–100)
NRBC BLD AUTO-RTO: 0 /100 WBCS — SIGNIFICANT CHANGE UP (ref 0–0)
PHOSPHATE SERPL-MCNC: 2.4 MG/DL — LOW (ref 2.5–4.5)
PLATELET # BLD AUTO: 162 K/UL — SIGNIFICANT CHANGE UP (ref 150–400)
POTASSIUM SERPL-MCNC: 3.6 MMOL/L — SIGNIFICANT CHANGE UP (ref 3.5–5.3)
POTASSIUM SERPL-SCNC: 3.6 MMOL/L — SIGNIFICANT CHANGE UP (ref 3.5–5.3)
RBC # BLD: 4.18 M/UL — LOW (ref 4.2–5.8)
RBC # FLD: 12.9 % — SIGNIFICANT CHANGE UP (ref 10.3–14.5)
SODIUM SERPL-SCNC: 138 MMOL/L — SIGNIFICANT CHANGE UP (ref 135–145)
WBC # BLD: 5.87 K/UL — SIGNIFICANT CHANGE UP (ref 3.8–10.5)
WBC # FLD AUTO: 5.87 K/UL — SIGNIFICANT CHANGE UP (ref 3.8–10.5)

## 2025-06-02 PROCEDURE — 99233 SBSQ HOSP IP/OBS HIGH 50: CPT

## 2025-06-02 PROCEDURE — 71275 CT ANGIOGRAPHY CHEST: CPT | Mod: 26

## 2025-06-02 RX ORDER — SOD PHOS DI, MONO/K PHOS MONO 250 MG
1 TABLET ORAL EVERY 6 HOURS
Refills: 0 | Status: COMPLETED | OUTPATIENT
Start: 2025-06-02 | End: 2025-06-02

## 2025-06-02 RX ADMIN — ROSUVASTATIN CALCIUM 10 MILLIGRAM(S): 20 TABLET, FILM COATED ORAL at 23:27

## 2025-06-02 RX ADMIN — Medication 4 MILLILITER(S): at 19:11

## 2025-06-02 RX ADMIN — HEPARIN SODIUM 1700 UNIT(S)/HR: 1000 INJECTION INTRAVENOUS; SUBCUTANEOUS at 19:38

## 2025-06-02 RX ADMIN — LEVALBUTEROL HYDROCHLORIDE 0.63 MILLIGRAM(S): 1.25 SOLUTION RESPIRATORY (INHALATION) at 23:26

## 2025-06-02 RX ADMIN — LEVALBUTEROL HYDROCHLORIDE 0.63 MILLIGRAM(S): 1.25 SOLUTION RESPIRATORY (INHALATION) at 01:27

## 2025-06-02 RX ADMIN — CEFTRIAXONE 100 MILLIGRAM(S): 500 INJECTION, POWDER, FOR SOLUTION INTRAMUSCULAR; INTRAVENOUS at 23:26

## 2025-06-02 RX ADMIN — LEVALBUTEROL HYDROCHLORIDE 0.63 MILLIGRAM(S): 1.25 SOLUTION RESPIRATORY (INHALATION) at 17:24

## 2025-06-02 RX ADMIN — ESCITALOPRAM OXALATE 10 MILLIGRAM(S): 20 TABLET ORAL at 11:35

## 2025-06-02 RX ADMIN — Medication 40 MILLIEQUIVALENT(S): at 19:34

## 2025-06-02 RX ADMIN — LEVALBUTEROL HYDROCHLORIDE 0.63 MILLIGRAM(S): 1.25 SOLUTION RESPIRATORY (INHALATION) at 06:48

## 2025-06-02 RX ADMIN — Medication 25 MILLIGRAM(S): at 06:47

## 2025-06-02 RX ADMIN — HEPARIN SODIUM 1500 UNIT(S)/HR: 1000 INJECTION INTRAVENOUS; SUBCUTANEOUS at 07:14

## 2025-06-02 RX ADMIN — CLOPIDOGREL BISULFATE 75 MILLIGRAM(S): 75 TABLET, FILM COATED ORAL at 11:35

## 2025-06-02 RX ADMIN — HEPARIN SODIUM 1700 UNIT(S)/HR: 1000 INJECTION INTRAVENOUS; SUBCUTANEOUS at 17:24

## 2025-06-02 RX ADMIN — LINACLOTIDE 72 MICROGRAM(S): 290 CAPSULE, GELATIN COATED ORAL at 06:47

## 2025-06-02 RX ADMIN — HEPARIN SODIUM 1700 UNIT(S)/HR: 1000 INJECTION INTRAVENOUS; SUBCUTANEOUS at 22:27

## 2025-06-02 RX ADMIN — Medication 1 PACKET(S): at 17:24

## 2025-06-02 RX ADMIN — HEPARIN SODIUM 1700 UNIT(S)/HR: 1000 INJECTION INTRAVENOUS; SUBCUTANEOUS at 14:18

## 2025-06-02 RX ADMIN — HEPARIN SODIUM 1700 UNIT(S)/HR: 1000 INJECTION INTRAVENOUS; SUBCUTANEOUS at 19:35

## 2025-06-02 RX ADMIN — HEPARIN SODIUM 1500 UNIT(S)/HR: 1000 INJECTION INTRAVENOUS; SUBCUTANEOUS at 03:34

## 2025-06-02 RX ADMIN — Medication 250 MILLIGRAM(S): at 03:18

## 2025-06-02 RX ADMIN — Medication 500 MICROGRAM(S): at 19:11

## 2025-06-02 RX ADMIN — Medication 500 MICROGRAM(S): at 23:26

## 2025-06-02 RX ADMIN — LEVALBUTEROL HYDROCHLORIDE 0.63 MILLIGRAM(S): 1.25 SOLUTION RESPIRATORY (INHALATION) at 11:35

## 2025-06-02 RX ADMIN — Medication 40 MILLIGRAM(S): at 06:48

## 2025-06-02 RX ADMIN — METOPROLOL SUCCINATE 25 MILLIGRAM(S): 50 TABLET, EXTENDED RELEASE ORAL at 06:48

## 2025-06-02 RX ADMIN — MIRABEGRON 25 MILLIGRAM(S): 50 TABLET, FILM COATED, EXTENDED RELEASE ORAL at 11:35

## 2025-06-02 RX ADMIN — Medication 100 MILLIGRAM(S): at 11:35

## 2025-06-02 RX ADMIN — Medication 1 PACKET(S): at 11:35

## 2025-06-02 NOTE — PROGRESS NOTE ADULT - SUBJECTIVE AND OBJECTIVE BOX
[facility] Division of Hospital Medicine  Marizol Acosta MD  Available via MS Teams    SUBJECTIVE / OVERNIGHT EVENTS:  No acute events overnight. +Cough. No CP.     MEDICATIONS  (STANDING):  allopurinol 100 milliGRAM(s) Oral daily  azithromycin  IVPB 500 milliGRAM(s) IV Intermittent every 24 hours  azithromycin  IVPB 500     cefTRIAXone   IVPB      cefTRIAXone   IVPB 1000 milliGRAM(s) IV Intermittent every 24 hours  clopidogrel Tablet 75 milliGRAM(s) Oral daily  escitalopram 10 milliGRAM(s) Oral daily  heparin  Infusion.  Unit(s)/Hr (18 mL/Hr) IV Continuous <Continuous>  ipratropium    for Nebulization 500 MICROGram(s) Nebulizer every 6 hours  levalbuterol Inhalation 0.63 milliGRAM(s) Inhalation every 6 hours  linaclotide 72 MICROGram(s) Oral before breakfast  metoprolol succinate ER 25 milliGRAM(s) Oral daily  mirabegron ER 25 milliGRAM(s) Oral daily  rosuvastatin 10 milliGRAM(s) Oral at bedtime  sodium chloride 3%  Inhalation 4 milliLiter(s) Inhalation every 12 hours  spironolactone 25 milliGRAM(s) Oral daily  torsemide 40 milliGRAM(s) Oral daily    MEDICATIONS  (PRN):  heparin   Injectable 8000 Unit(s) IV Push every 6 hours PRN For aPTT less than 40  heparin   Injectable 4000 Unit(s) IV Push every 6 hours PRN For aPTT between 40 - 57      I&O's Summary    02 Jun 2025 07:01  -  02 Jun 2025 18:41  --------------------------------------------------------  IN: 440 mL / OUT: 300 mL / NET: 140 mL        PHYSICAL EXAM:  Vital Signs Last 24 Hrs  T(C): 36.7 (02 Jun 2025 15:43), Max: 37.1 (01 Jun 2025 20:58)  T(F): 98 (02 Jun 2025 15:43), Max: 98.8 (01 Jun 2025 20:58)  HR: 87 (02 Jun 2025 15:43) (87 - 99)  BP: 128/73 (02 Jun 2025 15:43) (112/77 - 128/73)  BP(mean): --  RR: 18 (02 Jun 2025 15:43) (18 - 18)  SpO2: 91% (02 Jun 2025 15:43) (90% - 93%)    Parameters below as of 02 Jun 2025 15:43  Patient On (Oxygen Delivery Method): nasal cannula      CONSTITUTIONAL: uncomfortable appearing, on NC  EYES: EOMI; conjunctiva and sclera clear  ENMT: Moist oral mucosa  RESPIRATORY: Mild inc WOB, scattered rhonchi, no wheezing  CARDIOVASCULAR: RRR, no murmurs, RLE 1+ edema  ABDOMEN: Nontender to palpation,no rebound/guarding  PSYCH: A+O to person, place, and time  NEUROLOGY: no FND    LABS:                        13.5   5.87  )-----------( 162      ( 02 Jun 2025 07:08 )             40.2     06-02    138  |  96  |  29[H]  ----------------------------<  134[H]  3.6   |  27  |  1.16    Ca    9.2      02 Jun 2025 07:08  Phos  2.4     06-02  Mg     2.3     06-02      PTT - ( 02 Jun 2025 16:08 )  PTT:61.8 sec      Urinalysis Basic - ( 02 Jun 2025 07:08 )    Color: x / Appearance: x / SG: x / pH: x  Gluc: 134 mg/dL / Ketone: x  / Bili: x / Urobili: x   Blood: x / Protein: x / Nitrite: x   Leuk Esterase: x / RBC: x / WBC x   Sq Epi: x / Non Sq Epi: x / Bacteria: x        Culture - Blood (collected 01 Jun 2025 00:56)  Source: Blood Blood-Peripheral  Preliminary Report (02 Jun 2025 04:01):    No growth at 24 hours    Culture - Blood (collected 01 Jun 2025 00:56)  Source: Blood Blood-Peripheral  Preliminary Report (02 Jun 2025 04:01):    No growth at 24 hours    Culture - Urine (collected 30 May 2025 23:54)  Source: Clean Catch Clean Catch (Midstream)  Final Report (01 Jun 2025 16:34):    >=3 organisms. Probable collection contamination.          RADIOLOGY & ADDITIONAL TESTS:  New Imaging Personally Reviewed Today:  New Electrocardiogram Personally Reviewed Today:  Other Results Reviewed Today:   Prior or Outpatient Records Reviewed Today with Summary:    COORDINATION OF CARE:  Consultant Communication and Details of Discussion (where applicable):

## 2025-06-03 DIAGNOSIS — I26.99 OTHER PULMONARY EMBOLISM WITHOUT ACUTE COR PULMONALE: ICD-10-CM

## 2025-06-03 LAB
ANION GAP SERPL CALC-SCNC: 15 MMOL/L — SIGNIFICANT CHANGE UP (ref 5–17)
APTT BLD: 39 SEC — HIGH (ref 26.1–36.8)
APTT BLD: >200 SEC — CRITICAL HIGH (ref 26.1–36.8)
BUN SERPL-MCNC: 30 MG/DL — HIGH (ref 7–23)
CALCIUM SERPL-MCNC: 9.1 MG/DL — SIGNIFICANT CHANGE UP (ref 8.4–10.5)
CHLORIDE SERPL-SCNC: 95 MMOL/L — LOW (ref 96–108)
CO2 SERPL-SCNC: 26 MMOL/L — SIGNIFICANT CHANGE UP (ref 22–31)
CREAT SERPL-MCNC: 1.12 MG/DL — SIGNIFICANT CHANGE UP (ref 0.5–1.3)
EGFR: 66 ML/MIN/1.73M2 — SIGNIFICANT CHANGE UP
EGFR: 66 ML/MIN/1.73M2 — SIGNIFICANT CHANGE UP
GAS PNL BLDV: SIGNIFICANT CHANGE UP
GLUCOSE SERPL-MCNC: 163 MG/DL — HIGH (ref 70–99)
HCT VFR BLD CALC: 40 % — SIGNIFICANT CHANGE UP (ref 39–50)
HGB BLD-MCNC: 13.6 G/DL — SIGNIFICANT CHANGE UP (ref 13–17)
MAGNESIUM SERPL-MCNC: 2.3 MG/DL — SIGNIFICANT CHANGE UP (ref 1.6–2.6)
MCHC RBC-ENTMCNC: 32.7 PG — SIGNIFICANT CHANGE UP (ref 27–34)
MCHC RBC-ENTMCNC: 34 G/DL — SIGNIFICANT CHANGE UP (ref 32–36)
MCV RBC AUTO: 96.2 FL — SIGNIFICANT CHANGE UP (ref 80–100)
NRBC BLD AUTO-RTO: 0 /100 WBCS — SIGNIFICANT CHANGE UP (ref 0–0)
PHOSPHATE SERPL-MCNC: 3.6 MG/DL — SIGNIFICANT CHANGE UP (ref 2.5–4.5)
PLATELET # BLD AUTO: 175 K/UL — SIGNIFICANT CHANGE UP (ref 150–400)
POTASSIUM SERPL-MCNC: 3.8 MMOL/L — SIGNIFICANT CHANGE UP (ref 3.5–5.3)
POTASSIUM SERPL-SCNC: 3.8 MMOL/L — SIGNIFICANT CHANGE UP (ref 3.5–5.3)
RBC # BLD: 4.16 M/UL — LOW (ref 4.2–5.8)
RBC # FLD: 12.8 % — SIGNIFICANT CHANGE UP (ref 10.3–14.5)
SODIUM SERPL-SCNC: 136 MMOL/L — SIGNIFICANT CHANGE UP (ref 135–145)
WBC # BLD: 7.47 K/UL — SIGNIFICANT CHANGE UP (ref 3.8–10.5)
WBC # FLD AUTO: 7.47 K/UL — SIGNIFICANT CHANGE UP (ref 3.8–10.5)

## 2025-06-03 PROCEDURE — 93971 EXTREMITY STUDY: CPT | Mod: 26,LT

## 2025-06-03 PROCEDURE — 99223 1ST HOSP IP/OBS HIGH 75: CPT

## 2025-06-03 PROCEDURE — 99233 SBSQ HOSP IP/OBS HIGH 50: CPT

## 2025-06-03 PROCEDURE — 99222 1ST HOSP IP/OBS MODERATE 55: CPT

## 2025-06-03 RX ORDER — APIXABAN 2.5 MG/1
1 TABLET, FILM COATED ORAL
Qty: 72 | Refills: 0
Start: 2025-06-03

## 2025-06-03 RX ORDER — APIXABAN 2.5 MG/1
1 TABLET, FILM COATED ORAL
Refills: 0
Start: 2025-06-03

## 2025-06-03 RX ORDER — METOPROLOL SUCCINATE 50 MG/1
12.5 TABLET, EXTENDED RELEASE ORAL ONCE
Refills: 0 | Status: COMPLETED | OUTPATIENT
Start: 2025-06-03 | End: 2025-06-03

## 2025-06-03 RX ORDER — DEXTROMETHORPHAN HBR, GUAIFENESIN 200 MG/10ML
600 LIQUID ORAL EVERY 12 HOURS
Refills: 0 | Status: DISCONTINUED | OUTPATIENT
Start: 2025-06-03 | End: 2025-06-04

## 2025-06-03 RX ORDER — IPRATROPIUM BROMIDE AND ALBUTEROL SULFATE .5; 2.5 MG/3ML; MG/3ML
3 SOLUTION RESPIRATORY (INHALATION) ONCE
Refills: 0 | Status: DISCONTINUED | OUTPATIENT
Start: 2025-06-03 | End: 2025-06-04

## 2025-06-03 RX ADMIN — Medication 250 MILLIGRAM(S): at 04:22

## 2025-06-03 RX ADMIN — Medication 4 MILLILITER(S): at 06:29

## 2025-06-03 RX ADMIN — Medication 100 MILLIGRAM(S): at 11:52

## 2025-06-03 RX ADMIN — Medication 500 MICROGRAM(S): at 11:52

## 2025-06-03 RX ADMIN — MIRABEGRON 25 MILLIGRAM(S): 50 TABLET, FILM COATED, EXTENDED RELEASE ORAL at 11:52

## 2025-06-03 RX ADMIN — HEPARIN SODIUM 1800 UNIT(S)/HR: 1000 INJECTION INTRAVENOUS; SUBCUTANEOUS at 19:53

## 2025-06-03 RX ADMIN — HEPARIN SODIUM 2100 UNIT(S)/HR: 1000 INJECTION INTRAVENOUS; SUBCUTANEOUS at 08:55

## 2025-06-03 RX ADMIN — DEXTROMETHORPHAN HBR, GUAIFENESIN 600 MILLIGRAM(S): 200 LIQUID ORAL at 17:58

## 2025-06-03 RX ADMIN — HEPARIN SODIUM 1800 UNIT(S)/HR: 1000 INJECTION INTRAVENOUS; SUBCUTANEOUS at 17:37

## 2025-06-03 RX ADMIN — HEPARIN SODIUM 8000 UNIT(S): 1000 INJECTION INTRAVENOUS; SUBCUTANEOUS at 08:58

## 2025-06-03 RX ADMIN — Medication 500 MICROGRAM(S): at 17:39

## 2025-06-03 RX ADMIN — METOPROLOL SUCCINATE 12.5 MILLIGRAM(S): 50 TABLET, EXTENDED RELEASE ORAL at 23:32

## 2025-06-03 RX ADMIN — Medication 500 MICROGRAM(S): at 06:28

## 2025-06-03 RX ADMIN — METOPROLOL SUCCINATE 25 MILLIGRAM(S): 50 TABLET, EXTENDED RELEASE ORAL at 06:28

## 2025-06-03 RX ADMIN — Medication 25 MILLIGRAM(S): at 06:28

## 2025-06-03 RX ADMIN — ESCITALOPRAM OXALATE 10 MILLIGRAM(S): 20 TABLET ORAL at 11:52

## 2025-06-03 RX ADMIN — LINACLOTIDE 72 MICROGRAM(S): 290 CAPSULE, GELATIN COATED ORAL at 06:28

## 2025-06-03 RX ADMIN — LEVALBUTEROL HYDROCHLORIDE 0.63 MILLIGRAM(S): 1.25 SOLUTION RESPIRATORY (INHALATION) at 23:32

## 2025-06-03 RX ADMIN — Medication 4 MILLILITER(S): at 17:38

## 2025-06-03 RX ADMIN — HEPARIN SODIUM 1700 UNIT(S)/HR: 1000 INJECTION INTRAVENOUS; SUBCUTANEOUS at 07:43

## 2025-06-03 RX ADMIN — Medication 40 MILLIGRAM(S): at 06:28

## 2025-06-03 RX ADMIN — CLOPIDOGREL BISULFATE 75 MILLIGRAM(S): 75 TABLET, FILM COATED ORAL at 11:52

## 2025-06-03 RX ADMIN — HEPARIN SODIUM 0 UNIT(S)/HR: 1000 INJECTION INTRAVENOUS; SUBCUTANEOUS at 16:31

## 2025-06-03 RX ADMIN — LEVALBUTEROL HYDROCHLORIDE 0.63 MILLIGRAM(S): 1.25 SOLUTION RESPIRATORY (INHALATION) at 11:52

## 2025-06-03 RX ADMIN — HEPARIN SODIUM 2100 UNIT(S)/HR: 1000 INJECTION INTRAVENOUS; SUBCUTANEOUS at 10:36

## 2025-06-03 RX ADMIN — LEVALBUTEROL HYDROCHLORIDE 0.63 MILLIGRAM(S): 1.25 SOLUTION RESPIRATORY (INHALATION) at 17:38

## 2025-06-03 RX ADMIN — LEVALBUTEROL HYDROCHLORIDE 0.63 MILLIGRAM(S): 1.25 SOLUTION RESPIRATORY (INHALATION) at 06:28

## 2025-06-03 RX ADMIN — Medication 500 MICROGRAM(S): at 23:32

## 2025-06-03 RX ADMIN — ROSUVASTATIN CALCIUM 10 MILLIGRAM(S): 20 TABLET, FILM COATED ORAL at 23:32

## 2025-06-03 NOTE — CONSULT NOTE ADULT - SUBJECTIVE AND OBJECTIVE BOX
Vascular Cardiology Consult Note     DIRECT PROVIDER NUMBER: [phone]  / Available on TEAMS    CC:  weakness and DVT    HPI:  85 y/o M with past medical history of HTN, HLD, CAD s/p PCI, History of HFpEF, presented after multiple falls and general weakness, noted to have R LE DVT. CTA chest 6/2 showed bilateral pulmonary thromboemboli, most proximally in the right interlobar pulmonary artery. No evidence of right heart strain. R LE venous duplex 5/31 showed right popliteal vein DVT. Troponin T 140 -> 96. BNP 2900. Lactate normal. Patient noted to have coughing and congestion during bedside exam. No chest pain. Mild LE edema. Vascular Cardiology consulted.     Allergies  No Known Allergies    MEDICATIONS:  clopidogrel Tablet 75 milliGRAM(s) Oral daily  heparin   Injectable 8000 Unit(s) IV Push every 6 hours PRN  heparin   Injectable 4000 Unit(s) IV Push every 6 hours PRN  heparin  Infusion.  Unit(s)/Hr IV Continuous <Continuous>  metoprolol succinate ER 25 milliGRAM(s) Oral daily  spironolactone 25 milliGRAM(s) Oral daily  torsemide 40 milliGRAM(s) Oral daily  azithromycin  IVPB 500 milliGRAM(s) IV Intermittent every 24 hours  azithromycin  IVPB 500     cefTRIAXone   IVPB      cefTRIAXone   IVPB 1000 milliGRAM(s) IV Intermittent every 24 hours  guaiFENesin  milliGRAM(s) Oral every 12 hours PRN  ipratropium    for Nebulization 500 MICROGram(s) Nebulizer every 6 hours  levalbuterol Inhalation 0.63 milliGRAM(s) Inhalation every 6 hours  sodium chloride 3%  Inhalation 4 milliLiter(s) Inhalation every 12 hours  escitalopram 10 milliGRAM(s) Oral daily  linaclotide 72 MICROGram(s) Oral before breakfast  allopurinol 100 milliGRAM(s) Oral daily  rosuvastatin 10 milliGRAM(s) Oral at bedtime  mirabegron ER 25 milliGRAM(s) Oral daily    PAST MEDICAL & SURGICAL HISTORY:  Chronic diastolic congestive heart failure  CAD (coronary artery disease)  No significant past surgical history    FAMILY HISTORY:  No pertinent family history in first degree relatives    SOCIAL HISTORY:  unchanged    REVIEW OF SYSTEMS:  CONSTITUTIONAL: No fever  EYES: No eye pain  ENT:  No throat pain  NECK: No pain   RESPIRATORY: SOB on admission    CARDIOVASCULAR:  No C/P  GASTROINTESTINAL: No abdominal pain  GENITOURINARY: No hematuria  NEUROLOGICAL: No memory loss  SKIN: No LE wounds  LYMPH Nodes: No enlarged glands noted  ENDOCRINE: No heat or cold intolerance noted  MUSCULOSKELETAL:  LE edema  PSYCHIATRIC: No depression, anxiety  HEME/LYMPH: No bleeding gums  ALLERGY AND IMMUNOLOGIC: No hives    [ x] All others negative	    PHYSICAL EXAM:  T(C): 36.9 (06-03-25 @ 11:00), Max: 37 (06-03-25 @ 02:20)  HR: 88 (06-03-25 @ 11:00) (83 - 93)  BP: 145/89 (06-03-25 @ 11:00) (112/70 - 145/89)  RR: 18 (06-03-25 @ 11:00) (18 - 18)  SpO2: 91% (06-03-25 @ 11:00) (91% - 95%)  Wt(kg): --  I&O's Summary    02 Jun 2025 07:01  -  03 Jun 2025 07:00  --------------------------------------------------------  IN: 680 mL / OUT: 600 mL / NET: 80 mL    03 Jun 2025 07:01  -  03 Jun 2025 11:50  --------------------------------------------------------  IN: 180 mL / OUT: 700 mL / NET: -520 mL    Appearance: Coughing 	  Cardiovascular: RRR, S1 and S2  Respiratory: Decreased BS Bases  Psychiatry:  AAO x 3  Gastrointestinal:  Soft, Non-tender, + BS	  Skin: No cyanosis	  Extremities: Mild LE edema, bilateral calves soft   Foot Exam: No tissue loss    LABS:	 	    CBC Full  -  ( 03 Jun 2025 07:04 )  WBC Count : 7.47 K/uL  Hemoglobin : 13.6 g/dL  Hematocrit : 40.0 %  Platelet Count - Automated : 175 K/uL  Mean Cell Volume : 96.2 fl  Mean Cell Hemoglobin : 32.7 pg  Mean Cell Hemoglobin Concentration : 34.0 g/dL  Auto Neutrophil # : x  Auto Lymphocyte # : x  Auto Monocyte # : x  Auto Eosinophil # : x  Auto Basophil # : x  Auto Neutrophil % : x  Auto Lymphocyte % : x  Auto Monocyte % : x  Auto Eosinophil % : x  Auto Basophil % : x    06-03    136  |  95[L]  |  30[H]  ----------------------------<  163[H]  3.8   |  26  |  1.12  06-02    138  |  96  |  29[H]  ----------------------------<  134[H]  3.6   |  27  |  1.16    Ca    9.1      03 Jun 2025 07:02  Ca    9.2      02 Jun 2025 07:08  Phos  3.6     06-03  Phos  2.4     06-02  Mg     2.3     06-03  Mg     2.3     06-02      Assessment:  1. Bilateral PE  2. R LE DVT  3. HFpEF  4. Cough  5. CAD s/p PCI  6. HTN  7. History of Falls    Plan:  1. Recommend to continue Heparin gtt at this time. Maintain therapeutic PTT.  2. Follow-up TTE.  3. Troponin down-trending. Lactate normal.  4. Agree with diuresis.   5. Recommend Pulmonary follow-up for cough and atelectasis on CT. Pulmonary optimization.  6. No evidence of phlegmasia.   7. No catheter based procedures for PE planned at this time.  8. Fall risk precautions  9. Continue age appropriate malignancy screening. MATILDA noted this admission which has resolved.  As outpatient will recommend CT abdomen / pelvis with IV contrast for malignancy evaluation.   10. Will continue to follow.  11. Appreciate excellent care by Dr. Acosta and Medicine Team.       Thank you  STEVEN Bryant, MS, [facility]  Vascular Cardiology Service    Please call with any questions:   DIRECT SERVICE NUMBER: [phone] / Available on TEAMS

## 2025-06-03 NOTE — PROGRESS NOTE ADULT - SUBJECTIVE AND OBJECTIVE BOX
[facility] Division of Hospital Medicine  Marizol Acosta MD  Available via MS Teams    SUBJECTIVE / OVERNIGHT EVENTS:  CT PE with PE overnight. Pt still with sig cough this AM. Denies other new sx.     MEDICATIONS  (STANDING):  allopurinol 100 milliGRAM(s) Oral daily  clopidogrel Tablet 75 milliGRAM(s) Oral daily  escitalopram 10 milliGRAM(s) Oral daily  heparin  Infusion.  Unit(s)/Hr (18 mL/Hr) IV Continuous <Continuous>  ipratropium    for Nebulization 500 MICROGram(s) Nebulizer every 6 hours  levalbuterol Inhalation 0.63 milliGRAM(s) Inhalation every 6 hours  linaclotide 72 MICROGram(s) Oral before breakfast  metoprolol succinate ER 25 milliGRAM(s) Oral daily  mirabegron ER 25 milliGRAM(s) Oral daily  rosuvastatin 10 milliGRAM(s) Oral at bedtime  sodium chloride 3%  Inhalation 4 milliLiter(s) Inhalation every 12 hours  spironolactone 25 milliGRAM(s) Oral daily  torsemide 40 milliGRAM(s) Oral daily    MEDICATIONS  (PRN):  guaiFENesin  milliGRAM(s) Oral every 12 hours PRN Cough  heparin   Injectable 8000 Unit(s) IV Push every 6 hours PRN For aPTT less than 40  heparin   Injectable 4000 Unit(s) IV Push every 6 hours PRN For aPTT between 40 - 57      I&O's Summary    02 Jun 2025 07:01  -  03 Jun 2025 07:00  --------------------------------------------------------  IN: 680 mL / OUT: 600 mL / NET: 80 mL    03 Jun 2025 07:01  -  03 Jun 2025 15:09  --------------------------------------------------------  IN: 180 mL / OUT: 700 mL / NET: -520 mL        PHYSICAL EXAM:  Vital Signs Last 24 Hrs  T(C): 36.9 (03 Jun 2025 11:00), Max: 37 (03 Jun 2025 02:20)  T(F): 98.5 (03 Jun 2025 11:00), Max: 98.6 (03 Jun 2025 02:20)  HR: 88 (03 Jun 2025 11:00) (83 - 93)  BP: 145/89 (03 Jun 2025 11:00) (112/70 - 145/89)  BP(mean): --  RR: 18 (03 Jun 2025 11:00) (18 - 18)  SpO2: 91% (03 Jun 2025 11:00) (91% - 95%)    Parameters below as of 03 Jun 2025 11:00  Patient On (Oxygen Delivery Method): nasal cannula  O2 Flow (L/min): 6    CONSTITUTIONAL: uncomfortable appearing, on NC  EYES: EOMI; conjunctiva and sclera clear  ENMT: Moist oral mucosa  RESPIRATORY: Mild inc WOB, scattered rhonchi, no wheezing  CARDIOVASCULAR: RRR, no murmurs, no LE edema  ABDOMEN: Nontender to palpation,no rebound/guarding  PSYCH: A+O to person, place, and time  NEUROLOGY: no FND    LABS:                        13.6   7.47  )-----------( 175      ( 03 Jun 2025 07:04 )             40.0     06-03    136  |  95[L]  |  30[H]  ----------------------------<  163[H]  3.8   |  26  |  1.12    Ca    9.1      03 Jun 2025 07:02  Phos  3.6     06-03  Mg     2.3     06-03      PTT - ( 03 Jun 2025 07:04 )  PTT:39.0 sec      Urinalysis Basic - ( 03 Jun 2025 07:02 )    Color: x / Appearance: x / SG: x / pH: x  Gluc: 163 mg/dL / Ketone: x  / Bili: x / Urobili: x   Blood: x / Protein: x / Nitrite: x   Leuk Esterase: x / RBC: x / WBC x   Sq Epi: x / Non Sq Epi: x / Bacteria: x        Culture - Blood (collected 01 Jun 2025 00:56)  Source: Blood Blood-Peripheral  Preliminary Report (03 Jun 2025 04:01):    No growth at 48 Hours    Culture - Blood (collected 01 Jun 2025 00:56)  Source: Blood Blood-Peripheral  Preliminary Report (03 Jun 2025 04:01):    No growth at 48 Hours          RADIOLOGY & ADDITIONAL TESTS:  New Imaging Personally Reviewed Today:  New Electrocardiogram Personally Reviewed Today:  Other Results Reviewed Today:   Prior or Outpatient Records Reviewed Today with Summary:    COORDINATION OF CARE:  Consultant Communication and Details of Discussion (where applicable):

## 2025-06-03 NOTE — CONSULT NOTE ADULT - SUBJECTIVE AND OBJECTIVE BOX
CHIEF COMPLAINT: Fall    HPI: 85 y/o M w/CAD and CHF w/unknown EF admitted for multiple falls found to have DVT then noted to be hypoxemic with CTA chest showing acute pulmonary emboli and atelectasis. Patient currently reports feeling ok. No dyspnea, chest pain, fevers, chills, nausea, emesis, or diarrhea.     PAST MEDICAL & SURGICAL HISTORY:  Chronic diastolic congestive heart failure      CAD (coronary artery disease)      No significant past surgical history          FAMILY HISTORY:  No pertinent family history in first degree relatives        SOCIAL HISTORY:  No tobacco or drug use    Allergies    No Known Allergies    Intolerances        HOME MEDICATIONS:  Home Medications:  allopurinol 100 mg oral tablet: orally once a day (31 May 2025 13:13)  aspirin 81 mg oral tablet: orally once a day (31 May 2025 13:10)  clopidogrel 75 mg oral tablet: 1 tab(s) orally once a day (31 May 2025 13:10)  gabapentin 600 mg oral tablet: 1 tab(s) orally every 8 hours (31 May 2025 13:10)  Lexapro 10 mg oral tablet: 1 tab(s) orally once a day (31 May 2025 13:10)  Linzess 72 mcg oral capsule: 1 cap(s) orally once a day (31 May 2025 13:10)  Metoprolol Succinate ER 25 mg oral tablet, extended release: 1 tab(s) orally once a day (31 May 2025 13:10)  mirabegron 25 mg oral tablet, extended release: 1 tab(s) orally once a day (31 May 2025 13:10)  rosuvastatin 10 mg oral capsule: 1 cap(s) orally once a day (31 May 2025 13:10)  spironolactone 25 mg oral tablet: 1 tab(s) orally once a day (31 May 2025 13:10)  torsemide 40 mg oral tablet: 1 tab(s) orally once a day (31 May 2025 13:10)      REVIEW OF SYSTEMS:  See above. ROS otherwise negative.    OBJECTIVE:  ICU Vital Signs Last 24 Hrs  T(C): 36.9 (03 Jun 2025 11:00), Max: 37 (03 Jun 2025 02:20)  T(F): 98.5 (03 Jun 2025 11:00), Max: 98.6 (03 Jun 2025 02:20)  HR: 88 (03 Jun 2025 11:00) (83 - 93)  BP: 145/89 (03 Jun 2025 11:00) (112/70 - 145/89)  BP(mean): --  ABP: --  ABP(mean): --  RR: 18 (03 Jun 2025 11:00) (18 - 18)  SpO2: 91% (03 Jun 2025 11:00) (91% - 95%)    O2 Parameters below as of 03 Jun 2025 11:00  Patient On (Oxygen Delivery Method): nasal cannula  O2 Flow (L/min): 6            06-02 @ 07:01  -  06-03 @ 07:00  --------------------------------------------------------  IN: 680 mL / OUT: 600 mL / NET: 80 mL    06-03 @ 07:01  -  06-03 @ 14:56  --------------------------------------------------------  IN: 180 mL / OUT: 700 mL / NET: -520 mL      CAPILLARY BLOOD GLUCOSE          PHYSICAL EXAM:  General: Elderly male lying in bed, NAD  HEENT: NC/AT sclerae anicteric  Neck: Supple  Respiratory: Tachypneic, shallow respirations. Diminished breath sounds at bases b/l  Cardiovascular: S1, S2  Abdomen: Soft, + BS  Extremities: WWP  Neurological: Awake, alert, follows commands  Psychiatry: Appropriate affect    HOSPITAL MEDICATIONS:  Standing Meds:  allopurinol 100 milliGRAM(s) Oral daily  clopidogrel Tablet 75 milliGRAM(s) Oral daily  escitalopram 10 milliGRAM(s) Oral daily  heparin  Infusion.  Unit(s)/Hr IV Continuous <Continuous>  ipratropium    for Nebulization 500 MICROGram(s) Nebulizer every 6 hours  levalbuterol Inhalation 0.63 milliGRAM(s) Inhalation every 6 hours  linaclotide 72 MICROGram(s) Oral before breakfast  metoprolol succinate ER 25 milliGRAM(s) Oral daily  mirabegron ER 25 milliGRAM(s) Oral daily  rosuvastatin 10 milliGRAM(s) Oral at bedtime  sodium chloride 3%  Inhalation 4 milliLiter(s) Inhalation every 12 hours  spironolactone 25 milliGRAM(s) Oral daily  torsemide 40 milliGRAM(s) Oral daily      PRN Meds:  guaiFENesin  milliGRAM(s) Oral every 12 hours PRN  heparin   Injectable 8000 Unit(s) IV Push every 6 hours PRN  heparin   Injectable 4000 Unit(s) IV Push every 6 hours PRN      LABS:                        13.6   7.47  )-----------( 175      ( 03 Jun 2025 07:04 )             40.0     Hgb Trend: 13.6<--, 13.5<--, 14.2<--, 14.5<--, 14.1<--  06-03    136  |  95[L]  |  30[H]  ----------------------------<  163[H]  3.8   |  26  |  1.12    Ca    9.1      03 Jun 2025 07:02  Phos  3.6     06-03  Mg     2.3     06-03      Creatinine Trend: 1.12<--, 1.16<--, 1.26<--, 1.33<--, 1.81<--  PTT - ( 03 Jun 2025 07:04 )  PTT:39.0 sec  Urinalysis Basic - ( 03 Jun 2025 07:02 )    Color: x / Appearance: x / SG: x / pH: x  Gluc: 163 mg/dL / Ketone: x  / Bili: x / Urobili: x   Blood: x / Protein: x / Nitrite: x   Leuk Esterase: x / RBC: x / WBC x   Sq Epi: x / Non Sq Epi: x / Bacteria: x        Venous Blood Gas:  06-03 @ 07:05  7.43/47/62/31/92.0  VBG Lactate: 1.7      MICROBIOLOGY:     Culture - Blood (collected 01 Jun 2025 00:56)  Source: Blood Blood-Peripheral  Preliminary Report (03 Jun 2025 04:01):    No growth at 48 Hours    Culture - Blood (collected 01 Jun 2025 00:56)  Source: Blood Blood-Peripheral  Preliminary Report (03 Jun 2025 04:01):    No growth at 48 Hours        RADIOLOGY:  [x ] Reviewed and interpreted by me    PULMONARY FUNCTION TESTS:    EKG:

## 2025-06-03 NOTE — CONSULT NOTE ADULT - ASSESSMENT
83 y/o M w/CAD and CHF w/unknown EF admitted for multiple falls found to be hypoxemic with DVT/PE and bibasilar atelectasis. Hypoxemia likely secondary to a combination of PE and atelectasis.    # Hypoxemia  # Acute PE  # Atelectasis    - Supplemental O2 as needed goal O2 sat >= 90%  - Therapeutic AC, follow up TTE  - Incentive spirometry, OOB as tolerated  - Please recall with questions/concerns

## 2025-06-04 LAB
ANION GAP SERPL CALC-SCNC: 17 MMOL/L — SIGNIFICANT CHANGE UP (ref 5–17)
APTT 50/50 2HOUR INCUB: 40.3 SEC — HIGH (ref 26.1–37.8)
APTT BLD: 102.7 SEC — HIGH (ref 26.1–36.8)
APTT BLD: 120.1 SEC — CRITICAL HIGH (ref 26.1–36.8)
APTT BLD: 126.6 SEC — CRITICAL HIGH (ref 26.1–36.8)
APTT BLD: 37.8 SEC — SIGNIFICANT CHANGE UP (ref 26.1–37.8)
APTT BLD: 46.1 SEC — HIGH (ref 26.1–36.8)
APTT BLD: 52.7 SEC — HIGH (ref 26.1–36.8)
APTT BLD: 97.3 SEC — HIGH (ref 26.1–36.8)
B2 GLYCOPROT1 IGA SER QL: <2 U/ML — SIGNIFICANT CHANGE UP
B2 GLYCOPROT1 IGG SER-ACNC: <1.4 U/ML — SIGNIFICANT CHANGE UP
B2 GLYCOPROT1 IGM SER-ACNC: <1.5 U/ML — SIGNIFICANT CHANGE UP
BUN SERPL-MCNC: 33 MG/DL — HIGH (ref 7–23)
CALCIUM SERPL-MCNC: 9.5 MG/DL — SIGNIFICANT CHANGE UP (ref 8.4–10.5)
CARDIOLIPIN IGM SER-MCNC: <1.5 MPL U/ML — SIGNIFICANT CHANGE UP
CARDIOLIPIN IGM SER-MCNC: <1.6 GPL U/ML — SIGNIFICANT CHANGE UP
CHLORIDE SERPL-SCNC: 98 MMOL/L — SIGNIFICANT CHANGE UP (ref 96–108)
CO2 SERPL-SCNC: 25 MMOL/L — SIGNIFICANT CHANGE UP (ref 22–31)
CREAT SERPL-MCNC: 1.16 MG/DL — SIGNIFICANT CHANGE UP (ref 0.5–1.3)
DEPRECATED CARDIOLIPIN IGA SER: 2.3 APL U/ML — SIGNIFICANT CHANGE UP
EGFR: 63 ML/MIN/1.73M2 — SIGNIFICANT CHANGE UP
EGFR: 63 ML/MIN/1.73M2 — SIGNIFICANT CHANGE UP
GLUCOSE SERPL-MCNC: 131 MG/DL — HIGH (ref 70–99)
HCT VFR BLD CALC: 41.6 % — SIGNIFICANT CHANGE UP (ref 39–50)
HGB BLD-MCNC: 14.5 G/DL — SIGNIFICANT CHANGE UP (ref 13–17)
MCHC RBC-ENTMCNC: 33 PG — SIGNIFICANT CHANGE UP (ref 27–34)
MCHC RBC-ENTMCNC: 34.9 G/DL — SIGNIFICANT CHANGE UP (ref 32–36)
MCV RBC AUTO: 94.8 FL — SIGNIFICANT CHANGE UP (ref 80–100)
NRBC BLD AUTO-RTO: 0 /100 WBCS — SIGNIFICANT CHANGE UP (ref 0–0)
PAT CTL 2H: 41.8 SEC — HIGH (ref 26.1–37.8)
PLATELET # BLD AUTO: 203 K/UL — SIGNIFICANT CHANGE UP (ref 150–400)
POTASSIUM SERPL-MCNC: 3.9 MMOL/L — SIGNIFICANT CHANGE UP (ref 3.5–5.3)
POTASSIUM SERPL-SCNC: 3.9 MMOL/L — SIGNIFICANT CHANGE UP (ref 3.5–5.3)
RBC # BLD: 4.39 M/UL — SIGNIFICANT CHANGE UP (ref 4.2–5.8)
RBC # FLD: 12.7 % — SIGNIFICANT CHANGE UP (ref 10.3–14.5)
SODIUM SERPL-SCNC: 140 MMOL/L — SIGNIFICANT CHANGE UP (ref 135–145)
WBC # BLD: 7.4 K/UL — SIGNIFICANT CHANGE UP (ref 3.8–10.5)
WBC # FLD AUTO: 7.4 K/UL — SIGNIFICANT CHANGE UP (ref 3.8–10.5)

## 2025-06-04 PROCEDURE — 99221 1ST HOSP IP/OBS SF/LOW 40: CPT

## 2025-06-04 PROCEDURE — 99232 SBSQ HOSP IP/OBS MODERATE 35: CPT

## 2025-06-04 PROCEDURE — 99233 SBSQ HOSP IP/OBS HIGH 50: CPT

## 2025-06-04 RX ORDER — DEXTROMETHORPHAN HBR, GUAIFENESIN 200 MG/10ML
100 LIQUID ORAL EVERY 6 HOURS
Refills: 0 | Status: DISCONTINUED | OUTPATIENT
Start: 2025-06-04 | End: 2025-06-19

## 2025-06-04 RX ORDER — IPRATROPIUM BROMIDE AND ALBUTEROL SULFATE .5; 2.5 MG/3ML; MG/3ML
3 SOLUTION RESPIRATORY (INHALATION) EVERY 6 HOURS
Refills: 0 | Status: DISCONTINUED | OUTPATIENT
Start: 2025-06-04 | End: 2025-06-19

## 2025-06-04 RX ADMIN — Medication 500 MICROGRAM(S): at 07:08

## 2025-06-04 RX ADMIN — Medication 100 MILLIGRAM(S): at 12:58

## 2025-06-04 RX ADMIN — HEPARIN SODIUM 2000 UNIT(S)/HR: 1000 INJECTION INTRAVENOUS; SUBCUTANEOUS at 07:29

## 2025-06-04 RX ADMIN — LEVALBUTEROL HYDROCHLORIDE 0.63 MILLIGRAM(S): 1.25 SOLUTION RESPIRATORY (INHALATION) at 07:09

## 2025-06-04 RX ADMIN — MIRABEGRON 25 MILLIGRAM(S): 50 TABLET, FILM COATED, EXTENDED RELEASE ORAL at 12:58

## 2025-06-04 RX ADMIN — ESCITALOPRAM OXALATE 10 MILLIGRAM(S): 20 TABLET ORAL at 12:58

## 2025-06-04 RX ADMIN — Medication 40 MILLIGRAM(S): at 07:08

## 2025-06-04 RX ADMIN — CLOPIDOGREL BISULFATE 75 MILLIGRAM(S): 75 TABLET, FILM COATED ORAL at 12:58

## 2025-06-04 RX ADMIN — HEPARIN SODIUM 1600 UNIT(S)/HR: 1000 INJECTION INTRAVENOUS; SUBCUTANEOUS at 22:31

## 2025-06-04 RX ADMIN — HEPARIN SODIUM 2000 UNIT(S)/HR: 1000 INJECTION INTRAVENOUS; SUBCUTANEOUS at 01:41

## 2025-06-04 RX ADMIN — Medication 4 MILLILITER(S): at 15:02

## 2025-06-04 RX ADMIN — LINACLOTIDE 72 MICROGRAM(S): 290 CAPSULE, GELATIN COATED ORAL at 07:08

## 2025-06-04 RX ADMIN — Medication 25 MILLIGRAM(S): at 07:07

## 2025-06-04 RX ADMIN — HEPARIN SODIUM 1800 UNIT(S)/HR: 1000 INJECTION INTRAVENOUS; SUBCUTANEOUS at 08:21

## 2025-06-04 RX ADMIN — HEPARIN SODIUM 1800 UNIT(S)/HR: 1000 INJECTION INTRAVENOUS; SUBCUTANEOUS at 19:27

## 2025-06-04 RX ADMIN — HEPARIN SODIUM 1800 UNIT(S)/HR: 1000 INJECTION INTRAVENOUS; SUBCUTANEOUS at 15:03

## 2025-06-04 RX ADMIN — ROSUVASTATIN CALCIUM 10 MILLIGRAM(S): 20 TABLET, FILM COATED ORAL at 21:24

## 2025-06-04 RX ADMIN — IPRATROPIUM BROMIDE AND ALBUTEROL SULFATE 3 MILLILITER(S): .5; 2.5 SOLUTION RESPIRATORY (INHALATION) at 17:58

## 2025-06-04 RX ADMIN — METOPROLOL SUCCINATE 25 MILLIGRAM(S): 50 TABLET, EXTENDED RELEASE ORAL at 07:08

## 2025-06-04 RX ADMIN — Medication 4 MILLILITER(S): at 17:58

## 2025-06-04 RX ADMIN — IPRATROPIUM BROMIDE AND ALBUTEROL SULFATE 3 MILLILITER(S): .5; 2.5 SOLUTION RESPIRATORY (INHALATION) at 15:02

## 2025-06-04 RX ADMIN — DEXTROMETHORPHAN HBR, GUAIFENESIN 100 MILLIGRAM(S): 200 LIQUID ORAL at 17:58

## 2025-06-04 RX ADMIN — Medication 4 MILLILITER(S): at 07:08

## 2025-06-04 NOTE — PROVIDER CONTACT NOTE (CRITICAL VALUE NOTIFICATION) - ASSESSMENT
Pt A&O1-2 confused. VS as charted. Denies chest pain, sob or dizziness. No s/s of bleeding.
Pt A&O1-2 confused. VS as charted. Denies chest pain, sob or dizziness. No s/s of bleeding.

## 2025-06-04 NOTE — PROGRESS NOTE ADULT - SUBJECTIVE AND OBJECTIVE BOX
CHIEF COMPLAINT: Weakness and DVT    Interval Events: No acute events. Feels the same. Breathing unchanged. Still coughing. No chest pain, fevers, chills, nausea, emesis, or diarrhea.    REVIEW OF SYSTEMS:  See above. ROS otherwise negative    OBJECTIVE:  ICU Vital Signs Last 24 Hrs  T(C): 37.2 (04 Jun 2025 04:50), Max: 37.3 (04 Jun 2025 00:10)  T(F): 98.9 (04 Jun 2025 04:50), Max: 99.1 (04 Jun 2025 00:10)  HR: 91 (04 Jun 2025 04:50) (84 - 96)  BP: 117/75 (04 Jun 2025 04:50) (114/76 - 175/103)  BP(mean): --  ABP: --  ABP(mean): --  RR: 19 (04 Jun 2025 04:50) (18 - 20)  SpO2: 91% (04 Jun 2025 04:50) (91% - 92%)    O2 Parameters below as of 04 Jun 2025 04:50  Patient On (Oxygen Delivery Method): nasal cannula  O2 Flow (L/min): 6            06-03 @ 07:01  -  06-04 @ 07:00  --------------------------------------------------------  IN: 180 mL / OUT: 1500 mL / NET: -1320 mL      CAPILLARY BLOOD GLUCOSE          PHYSICAL EXAM:  General: Elderly male lying in bed, NAD  HEENT: NC/AT sclerae anicteric  Neck: Supple  Respiratory: Tachypneic, shallow respirations. + Ronchi and upper airway sounds. Diminished breath sounds at bases b/l.   Cardiovascular: S1, S2  Abdomen: Soft, + BS  Extremities: WWP  Neurological: Awake, alert, follows commands  Psychiatry: Appropriate affect      HOSPITAL MEDICATIONS:  MEDICATIONS  (STANDING):  albuterol/ipratropium for Nebulization. 3 milliLiter(s) Nebulizer once  allopurinol 100 milliGRAM(s) Oral daily  clopidogrel Tablet 75 milliGRAM(s) Oral daily  escitalopram 10 milliGRAM(s) Oral daily  heparin  Infusion.  Unit(s)/Hr (18 mL/Hr) IV Continuous <Continuous>  ipratropium    for Nebulization 500 MICROGram(s) Nebulizer every 6 hours  levalbuterol Inhalation 0.63 milliGRAM(s) Inhalation every 6 hours  linaclotide 72 MICROGram(s) Oral before breakfast  metoprolol succinate ER 25 milliGRAM(s) Oral daily  mirabegron ER 25 milliGRAM(s) Oral daily  rosuvastatin 10 milliGRAM(s) Oral at bedtime  sodium chloride 3%  Inhalation 4 milliLiter(s) Inhalation every 12 hours  spironolactone 25 milliGRAM(s) Oral daily  torsemide 40 milliGRAM(s) Oral daily    MEDICATIONS  (PRN):  guaiFENesin  milliGRAM(s) Oral every 12 hours PRN Cough  heparin   Injectable 8000 Unit(s) IV Push every 6 hours PRN For aPTT less than 40  heparin   Injectable 4000 Unit(s) IV Push every 6 hours PRN For aPTT between 40 - 57      LABS:                        14.5   7.40  )-----------( 203      ( 04 Jun 2025 07:21 )             41.6     Hgb Trend: 14.5<--, 13.6<--, 13.5<--, 14.2<--, 14.5<--  06-04    140  |  98  |  33[H]  ----------------------------<  131[H]  3.9   |  25  |  1.16    Ca    9.5      04 Jun 2025 07:18  Phos  3.6     06-03  Mg     2.3     06-03      Creatinine Trend: 1.16<--, 1.12<--, 1.16<--, 1.26<--, 1.33<--, 1.81<--  PTT - ( 04 Jun 2025 08:24 )  PTT:126.6 sec  Urinalysis Basic - ( 04 Jun 2025 07:18 )    Color: x / Appearance: x / SG: x / pH: x  Gluc: 131 mg/dL / Ketone: x  / Bili: x / Urobili: x   Blood: x / Protein: x / Nitrite: x   Leuk Esterase: x / RBC: x / WBC x   Sq Epi: x / Non Sq Epi: x / Bacteria: x        Venous Blood Gas:  06-03 @ 07:05  7.43/47/62/31/92.0  VBG Lactate: 1.7      MICROBIOLOGY:       RADIOLOGY:  [x ] Reviewed and interpreted by me    PULMONARY FUNCTION TESTS:    EKG:

## 2025-06-04 NOTE — PROGRESS NOTE ADULT - SUBJECTIVE AND OBJECTIVE BOX
[facility] Division of Hospital Medicine  Marizol Acosta MD  Available via MS Teams    SUBJECTIVE / OVERNIGHT EVENTS:  No acute events overnight. Still tachypneic. More confused today. Thinks he's at home.    MEDICATIONS  (STANDING):  albuterol/ipratropium for Nebulization 3 milliLiter(s) Nebulizer every 6 hours  allopurinol 100 milliGRAM(s) Oral daily  clopidogrel Tablet 75 milliGRAM(s) Oral daily  escitalopram 10 milliGRAM(s) Oral daily  heparin  Infusion.  Unit(s)/Hr (18 mL/Hr) IV Continuous <Continuous>  linaclotide 72 MICROGram(s) Oral before breakfast  metoprolol succinate ER 25 milliGRAM(s) Oral daily  mirabegron ER 25 milliGRAM(s) Oral daily  rosuvastatin 10 milliGRAM(s) Oral at bedtime  sodium chloride 3%  Inhalation 4 milliLiter(s) Inhalation every 6 hours  spironolactone 25 milliGRAM(s) Oral daily  torsemide 40 milliGRAM(s) Oral daily    MEDICATIONS  (PRN):  guaiFENesin  milliGRAM(s) Oral every 12 hours PRN Cough  heparin   Injectable 8000 Unit(s) IV Push every 6 hours PRN For aPTT less than 40  heparin   Injectable 4000 Unit(s) IV Push every 6 hours PRN For aPTT between 40 - 57      I&O's Summary    03 Jun 2025 07:01  -  04 Jun 2025 07:00  --------------------------------------------------------  IN: 180 mL / OUT: 1500 mL / NET: -1320 mL        PHYSICAL EXAM:  Vital Signs Last 24 Hrs  T(C): 37.2 (04 Jun 2025 04:50), Max: 37.3 (04 Jun 2025 00:10)  T(F): 98.9 (04 Jun 2025 04:50), Max: 99.1 (04 Jun 2025 00:10)  HR: 91 (04 Jun 2025 04:50) (84 - 96)  BP: 117/75 (04 Jun 2025 04:50) (114/76 - 175/103)  BP(mean): --  RR: 19 (04 Jun 2025 04:50) (19 - 20)  SpO2: 91% (04 Jun 2025 04:50) (91% - 92%)    Parameters below as of 04 Jun 2025 04:50  Patient On (Oxygen Delivery Method): nasal cannula  O2 Flow (L/min): 6    CONSTITUTIONAL: uncomfortable appearing, on NC  EYES: EOMI; conjunctiva and sclera clear  ENMT: Moist oral mucosa  RESPIRATORY: Mild inc WOB, scattered rhonchi, no wheezing  CARDIOVASCULAR: RRR, no murmurs, no LE edema  ABDOMEN: Nontender to palpation,no rebound/guarding  PSYCH: A+O to person, 6/4/2055, home  NEUROLOGY: no FND    LABS:                        14.5   7.40  )-----------( 203      ( 04 Jun 2025 07:21 )             41.6     06-04    140  |  98  |  33[H]  ----------------------------<  131[H]  3.9   |  25  |  1.16    Ca    9.5      04 Jun 2025 07:18  Phos  3.6     06-03  Mg     2.3     06-03      PTT - ( 04 Jun 2025 08:24 )  PTT:126.6 sec      Urinalysis Basic - ( 04 Jun 2025 07:18 )    Color: x / Appearance: x / SG: x / pH: x  Gluc: 131 mg/dL / Ketone: x  / Bili: x / Urobili: x   Blood: x / Protein: x / Nitrite: x   Leuk Esterase: x / RBC: x / WBC x   Sq Epi: x / Non Sq Epi: x / Bacteria: x            RADIOLOGY & ADDITIONAL TESTS:  New Imaging Personally Reviewed Today:  New Electrocardiogram Personally Reviewed Today:  Other Results Reviewed Today:   Prior or Outpatient Records Reviewed Today with Summary:    COORDINATION OF CARE:  Consultant Communication and Details of Discussion (where applicable):

## 2025-06-04 NOTE — PROVIDER CONTACT NOTE (CRITICAL VALUE NOTIFICATION) - SITUATION
Aptt greater than 120.1
Aptt greater than 200. Heparin rate adjusted as per nomogram order.
right above knee dvt

## 2025-06-04 NOTE — PROVIDER CONTACT NOTE (CRITICAL VALUE NOTIFICATION) - BACKGROUND
Pt admitted for right lower extremity on a heparin gtt.
Pt admitted for right lower extremity on a heparin gtt.

## 2025-06-04 NOTE — CHART NOTE - NSCHARTNOTEFT_GEN_A_CORE
Mounjaro sent to [facility] which looks like where we sent it the last time. Let me know if it needs to go to another pharmacy.   Jarod Chan HPI:  83 y/o M with past medical history of HTN, HLD, CAD with stents on DAPT, CHF presenting from the [facility] with falls. Per the granddaughter, patient has had multiple falls over the past few months (usually from sliding out of bed). Over the course of the past week, patient has had a new cough and resultant weakness and unsteadiness. The day prior to presentation, patient had a fall while ambulating with a walker. Family saw the patient that day and noted no new symptoms (f/chills/SOB/dysuria/worsened LE edema). After they left he had 2 more falls so presented to the ED.         14 point ROS otherwise negative    PAST MEDICAL & SURGICAL HISTORY:  Chronic diastolic congestive heart failure      CAD (coronary artery disease)      No significant past surgical history          Allergies    No Known Allergies    Intolerances        MEDICATIONS  (STANDING):  albuterol/ipratropium for Nebulization 3 milliLiter(s) Nebulizer every 6 hours  allopurinol 100 milliGRAM(s) Oral daily  clopidogrel Tablet 75 milliGRAM(s) Oral daily  escitalopram 10 milliGRAM(s) Oral daily  heparin  Infusion.  Unit(s)/Hr (18 mL/Hr) IV Continuous <Continuous>  linaclotide 72 MICROGram(s) Oral before breakfast  metoprolol succinate ER 25 milliGRAM(s) Oral daily  mirabegron ER 25 milliGRAM(s) Oral daily  rosuvastatin 10 milliGRAM(s) Oral at bedtime  sodium chloride 3%  Inhalation 4 milliLiter(s) Inhalation every 6 hours  spironolactone 25 milliGRAM(s) Oral daily  torsemide 40 milliGRAM(s) Oral daily    MEDICATIONS  (PRN):  guaiFENesin  milliGRAM(s) Oral every 12 hours PRN Cough  heparin   Injectable 8000 Unit(s) IV Push every 6 hours PRN For aPTT less than 40  heparin   Injectable 4000 Unit(s) IV Push every 6 hours PRN For aPTT between 40 - 57      FAMILY HISTORY:  No pertinent family history in first degree relatives        SOCIAL HISTORY: No EtOH, no tobacco        VITALS:   T(F): 98 (06-04-25 @ 11:48), Max: 99.1 (06-04-25 @ 00:10)  HR: 88 (06-04-25 @ 11:48)  BP: 118/85 (06-04-25 @ 11:48)  RR: 19 (06-04-25 @ 11:48)  SpO2: 91% (06-04-25 @ 11:48)  Wt(kg): --    PHYSICAL EXAM    GENERAL: NAD, well-developed  HEAD:  Atraumatic, Normocephalic  EYES: EOMI, PERRLA, conjunctiva and sclera clear  NECK: Supple, No JVD  CHEST/LUNG: Clear to auscultation bilaterally; No wheeze  HEART: Regular rate and rhythm; No murmurs, rubs, or gallops  ABDOMEN: Soft, Nontender, Nondistended; Bowel sounds present  EXTREMITIES:  2+ Peripheral Pulses, No clubbing, cyanosis, or edema  NEUROLOGY: non-focal  SKIN: No rashes or lesions    LABS:                         14.5   7.40  )-----------( 203      ( 04 Jun 2025 07:21 )             41.6     06-04    140  |  98  |  33[H]  ----------------------------<  131[H]  3.9   |  25  |  1.16    Ca    9.5      04 Jun 2025 07:18  Phos  3.6     06-03  Mg     2.3     06-03        PTT - ( 04 Jun 2025 14:29 )  PTT:97.3 sec  Blood Blood-Peripheral  06-01 @ 00:56   No growth at 72 Hours  --  --      Clean Catch Clean Catch (Midstream)  05-30 @ 23:54   >=3 organisms. Probable collection contamination.  --  --          IMAGING:  ACC: [id number] [facility]   ORDERED BY: KERRI VILLANUEVA     PROCEDURE DATE:  06/02/2025          INTERPRETATION:  INDICATION: New hypoxia    TECHNIQUE: Helical acquisition of the chest after the administration of   70 mL of Omnipaque 350. Maximum intensity projection images were   generated.    COMPARISON: None.    FINDINGS:    HEART/VASCULATURE: Normal heart size. No pericardial effusion. Coronary   artery calcification. Filling defect within the right interlobar   pulmonary artery and multiple lobar, segmental, and subsegmental branches   in the right upper and lower lobes in a segmental branch of the left   lower lobe.    LUNGS/AIRWAYS/PLEURA: No endobronchial lesion. Opacities in the lower   lobes and right middlelobe with associated volume loss, compatible with   atelectasis. Left upper lobe 1.7 cm nodule along the oblique fissure   (6-133). No pleural effusion.    LYMPH NODES/MEDIASTINUM: No lymphadenopathy.    UPPER ABDOMEN: Pancreatic calcifications suggestive of chronic   pancreatitis.    BONES/SOFT TISSUES: Multilevel spondylosis and bilateral glenohumeral   joint arthrosis.      IMPRESSION:    Bilateral pulmonary thromboemboli, most proximally in the right   interlobar pulmonary artery. No evidenceof right heart strain, although   recommend correlation with echocardiogram if clinically appropriate.   Findings were discussed by Dr. Dailey with JEREMIE Vergara NP on   6/2/2025 11:46 PM with read back confirmation.    Bibasilar atelectasis. Cannot exclude concurrent infarct.    Indeterminate left upper lobe 1.7 cm nodule. Recommend 8 week follow-up.   This was discussed with Dr. Acosta at 8:15 AM on 6/3/2025.    Assessment:   83 y/o M with past medical history of HTN, HLD, CAD with stents on DAPT, CHF presenting from the [facility] with falls, on workup was found to have bilateral pulmonary thromboemboli and a 1.7CM JS Lung nodule which is indeterminant. Differential diagnoses includes granuloma, mucus plug versus a primary lung neoplasm. No prior images available for comparison. Patient is an appropriate candidate for CCD- Lung Nodule Program.       PLAN  -- Recommend repeat CT- Chest in 8 weeks, will refer patient to Lung Nodule Program upon discharge.     Sarha Earl PA-C  [facility]   Available on TEAMS HPI:  85 y/o Male, never smoker, with past medical history of HTN, HLD, CAD with stents on DAPT, CHF presenting from the [facility] with falls. Per the granddaughter, patient has had multiple falls over the past few months (usually from sliding out of bed). Over the course of the past week, patient has had a new cough and resultant weakness and unsteadiness. The day prior to presentation, patient had a fall while ambulating with a walker. Family saw the patient that day and noted no new symptoms (f/chills/SOB/dysuria/worsened LE edema). After they left he had 2 more falls so presented to the ED.       14 point ROS otherwise negative    PAST MEDICAL & SURGICAL HISTORY:  Chronic diastolic congestive heart failure      CAD (coronary artery disease)      No significant past surgical history          Allergies    No Known Allergies    Intolerances        MEDICATIONS  (STANDING):  albuterol/ipratropium for Nebulization 3 milliLiter(s) Nebulizer every 6 hours  allopurinol 100 milliGRAM(s) Oral daily  clopidogrel Tablet 75 milliGRAM(s) Oral daily  escitalopram 10 milliGRAM(s) Oral daily  heparin  Infusion.  Unit(s)/Hr (18 mL/Hr) IV Continuous <Continuous>  linaclotide 72 MICROGram(s) Oral before breakfast  metoprolol succinate ER 25 milliGRAM(s) Oral daily  mirabegron ER 25 milliGRAM(s) Oral daily  rosuvastatin 10 milliGRAM(s) Oral at bedtime  sodium chloride 3%  Inhalation 4 milliLiter(s) Inhalation every 6 hours  spironolactone 25 milliGRAM(s) Oral daily  torsemide 40 milliGRAM(s) Oral daily    MEDICATIONS  (PRN):  guaiFENesin  milliGRAM(s) Oral every 12 hours PRN Cough  heparin   Injectable 8000 Unit(s) IV Push every 6 hours PRN For aPTT less than 40  heparin   Injectable 4000 Unit(s) IV Push every 6 hours PRN For aPTT between 40 - 57      FAMILY HISTORY:  No pertinent family history in first degree relatives        SOCIAL HISTORY: No EtOH, no tobacco        VITALS:   T(F): 98 (06-04-25 @ 11:48), Max: 99.1 (06-04-25 @ 00:10)  HR: 88 (06-04-25 @ 11:48)  BP: 118/85 (06-04-25 @ 11:48)  RR: 19 (06-04-25 @ 11:48)  SpO2: 91% (06-04-25 @ 11:48)  Wt(kg): --    PHYSICAL EXAM    GENERAL: NAD, well-developed  HEAD:  Atraumatic, Normocephalic  EYES: EOMI, PERRLA, conjunctiva and sclera clear  NECK: Supple, No JVD  CHEST/LUNG: Clear to auscultation bilaterally; No wheeze  HEART: Regular rate and rhythm; No murmurs, rubs, or gallops  ABDOMEN: Soft, Nontender, Nondistended; Bowel sounds present  EXTREMITIES:  2+ Peripheral Pulses, No clubbing, cyanosis, or edema  NEUROLOGY: non-focal  SKIN: No rashes or lesions    LABS:                         14.5   7.40  )-----------( 203      ( 04 Jun 2025 07:21 )             41.6     06-04    140  |  98  |  33[H]  ----------------------------<  131[H]  3.9   |  25  |  1.16    Ca    9.5      04 Jun 2025 07:18  Phos  3.6     06-03  Mg     2.3     06-03        PTT - ( 04 Jun 2025 14:29 )  PTT:97.3 sec  Blood Blood-Peripheral  06-01 @ 00:56   No growth at 72 Hours  --  --      Clean Catch Clean Catch (Midstream)  05-30 @ 23:54   >=3 organisms. Probable collection contamination.  --  --          IMAGING:  ACC: [id number] [facility]   ORDERED BY: KERRI VILLANUEVA     PROCEDURE DATE:  06/02/2025          INTERPRETATION:  INDICATION: New hypoxia    TECHNIQUE: Helical acquisition of the chest after the administration of   70 mL of Omnipaque 350. Maximum intensity projection images were   generated.    COMPARISON: None.    FINDINGS:    HEART/VASCULATURE: Normal heart size. No pericardial effusion. Coronary   artery calcification. Filling defect within the right interlobar   pulmonary artery and multiple lobar, segmental, and subsegmental branches   in the right upper and lower lobes in a segmental branch of the left   lower lobe.    LUNGS/AIRWAYS/PLEURA: No endobronchial lesion. Opacities in the lower   lobes and right middlelobe with associated volume loss, compatible with   atelectasis. Left upper lobe 1.7 cm nodule along the oblique fissure   (6-133). No pleural effusion.    LYMPH NODES/MEDIASTINUM: No lymphadenopathy.    UPPER ABDOMEN: Pancreatic calcifications suggestive of chronic   pancreatitis.    BONES/SOFT TISSUES: Multilevel spondylosis and bilateral glenohumeral   joint arthrosis.      IMPRESSION:    Bilateral pulmonary thromboemboli, most proximally in the right   interlobar pulmonary artery. No evidenceof right heart strain, although   recommend correlation with echocardiogram if clinically appropriate.   Findings were discussed by Dr. Dailey with JEREMIE Vergara NP on   6/2/2025 11:46 PM with read back confirmation.    Bibasilar atelectasis. Cannot exclude concurrent infarct.    Indeterminate left upper lobe 1.7 cm nodule. Recommend 8 week follow-up.   This was discussed with Dr. Acosta at 8:15 AM on 6/3/2025.    Assessment:   85 y/o M with past medical history of HTN, HLD, CAD with stents on DAPT, CHF presenting from the [facility] with falls, on workup was found to have bilateral pulmonary thromboemboli and a 1.7CM JS Lung nodule which is indeterminant. Differential diagnoses includes granuloma, mucus plug versus a primary lung neoplasm. No prior images available for comparison. Patient is an appropriate candidate for CCD- Lung Nodule Program.       PLAN  -- Recommend repeat CT- Chest in 8 weeks, will refer patient to Lung Nodule Program upon discharge.     Sarah Earl PA-C  [facility]   Available on TEAMS

## 2025-06-04 NOTE — PROVIDER CONTACT NOTE (CRITICAL VALUE NOTIFICATION) - ACTION/TREATMENT ORDERED:
will adjust according to nomogram
STEVEN Morejon made aware. Heparin rate adjusted as per orders. Plan of care ongoing.
STEVEN Morejon made aware. Heparin rate adjusted as per orders. Plan of care ongoing.

## 2025-06-04 NOTE — PROGRESS NOTE ADULT - SUBJECTIVE AND OBJECTIVE BOX
Vascular Cardiology Consult Note     DIRECT PROVIDER NUMBER: [phone]  / Available on TEAMS    CC:  weakness and DVT     He is coughing       Allergies  No Known Allergies   MEDICATIONS  (STANDING):  albuterol/ipratropium for Nebulization. 3 milliLiter(s) Nebulizer once  allopurinol 100 milliGRAM(s) Oral daily  clopidogrel Tablet 75 milliGRAM(s) Oral daily  escitalopram 10 milliGRAM(s) Oral daily  heparin  Infusion.  Unit(s)/Hr (18 mL/Hr) IV Continuous <Continuous>  ipratropium    for Nebulization 500 MICROGram(s) Nebulizer every 6 hours  levalbuterol Inhalation 0.63 milliGRAM(s) Inhalation every 6 hours  linaclotide 72 MICROGram(s) Oral before breakfast  metoprolol succinate ER 25 milliGRAM(s) Oral daily  mirabegron ER 25 milliGRAM(s) Oral daily  rosuvastatin 10 milliGRAM(s) Oral at bedtime  sodium chloride 3%  Inhalation 4 milliLiter(s) Inhalation every 12 hours  spironolactone 25 milliGRAM(s) Oral daily  torsemide 40 milliGRAM(s) Oral daily      PAST MEDICAL & SURGICAL HISTORY:  Chronic diastolic congestive heart failure  CAD (coronary artery disease)  No significant past surgical history    FAMILY HISTORY:  No pertinent family history in first degree relatives    SOCIAL HISTORY:  unchanged    REVIEW OF SYSTEMS:  CONSTITUTIONAL: No fever  EYES: No eye pain  ENT:  No throat pain  NECK: No pain   RESPIRATORY: SOB on admission    CARDIOVASCULAR:  No C/P  GASTROINTESTINAL: No abdominal pain  GENITOURINARY: No hematuria  NEUROLOGICAL: No memory loss  SKIN: No LE wounds  LYMPH Nodes: No enlarged glands noted  ENDOCRINE: No heat or cold intolerance noted  MUSCULOSKELETAL:  LE edema  PSYCHIATRIC: No depression, anxiety  HEME/LYMPH: No bleeding gums  ALLERGY AND IMMUNOLOGIC: No hives    [ x] All others negative	     ICU Vital Signs Last 24 Hrs  T(C): 37.2 (04 Jun 2025 04:50), Max: 37.3 (04 Jun 2025 00:10)  T(F): 98.9 (04 Jun 2025 04:50), Max: 99.1 (04 Jun 2025 00:10)  HR: 91 (04 Jun 2025 04:50) (84 - 96)  BP: 117/75 (04 Jun 2025 04:50) (114/76 - 175/103)  BP(mean): --  ABP: --  ABP(mean): --  RR: 19 (04 Jun 2025 04:50) (18 - 20)  SpO2: 91% (04 Jun 2025 04:50) (91% - 92%)    O2 Parameters below as of 04 Jun 2025 04:50  Patient On (Oxygen Delivery Method): nasal cannula  O2 Flow (L/min): 6          Appearance: Coughing 	  Cardiovascular: RRR, S1 and S2  Respiratory: Decreased BS Bases  Psychiatry:  AAO x 3  Gastrointestinal:  Soft, Non-tender, + BS	  Skin: No cyanosis	  Extremities: Mild LE edema, bilateral calves soft   Foot Exam: No tissue loss                          14.5   7.40  )-----------( 203      ( 04 Jun 2025 07:21 )             41.6     Assessment:  1. Bilateral PE  2. R LE DVT  3. HFpEF  4. Cough  5. CAD s/p PCI  6. HTN  7. History of Falls    Plan:  1. Recommend to continue Heparin gtt at this time. Maintain therapeutic PTT.  2. Follow-up TTE.  3. Troponin down-trending. Lactate normal.  4. Agree with diuresis.   5. Recommend Pulmonary follow-up for cough and atelectasis on CT. Pulmonary optimization.  6. No evidence of phlegmasia.   7. No catheter based procedures for PE planned at this time.  8. Fall risk precautions  9. Continue age appropriate malignancy screening. MATILDA noted this admission which has resolved.  As outpatient will recommend CT abdomen / pelvis with IV contrast for malignancy evaluation.   10. Will continue to follow.  11. Appreciate excellent care by Dr. Acosta and Medicine Team.        Tony

## 2025-06-05 ENCOUNTER — RESULT REVIEW (OUTPATIENT)
Age: 85
End: 2025-06-05

## 2025-06-05 LAB
ALBUMIN SERPL ELPH-MCNC: 3.5 G/DL — SIGNIFICANT CHANGE UP (ref 3.3–5)
ALP SERPL-CCNC: 71 U/L — SIGNIFICANT CHANGE UP (ref 40–120)
ALT FLD-CCNC: 13 U/L — SIGNIFICANT CHANGE UP (ref 10–45)
ANION GAP SERPL CALC-SCNC: 16 MMOL/L — SIGNIFICANT CHANGE UP (ref 5–17)
APTT BLD: 95.7 SEC — HIGH (ref 26.1–36.8)
APTT BLD: 95.7 SEC — HIGH (ref 26.1–36.8)
AST SERPL-CCNC: 22 U/L — SIGNIFICANT CHANGE UP (ref 10–40)
BILIRUB SERPL-MCNC: 0.6 MG/DL — SIGNIFICANT CHANGE UP (ref 0.2–1.2)
BUN SERPL-MCNC: 33 MG/DL — HIGH (ref 7–23)
CALCIUM SERPL-MCNC: 9.5 MG/DL — SIGNIFICANT CHANGE UP (ref 8.4–10.5)
CHLORIDE SERPL-SCNC: 95 MMOL/L — LOW (ref 96–108)
CO2 SERPL-SCNC: 26 MMOL/L — SIGNIFICANT CHANGE UP (ref 22–31)
CREAT SERPL-MCNC: 1.22 MG/DL — SIGNIFICANT CHANGE UP (ref 0.5–1.3)
DRVVT RATIO: 1.1 RATIO — SIGNIFICANT CHANGE UP (ref 0–1.21)
DRVVT SCREEN TO CONFIRM RATIO: SIGNIFICANT CHANGE UP
EGFR: 59 ML/MIN/1.73M2 — LOW
EGFR: 59 ML/MIN/1.73M2 — LOW
GAS PNL BLDV: SIGNIFICANT CHANGE UP
GLUCOSE SERPL-MCNC: 125 MG/DL — HIGH (ref 70–99)
HCT VFR BLD CALC: 44.1 % — SIGNIFICANT CHANGE UP (ref 39–50)
HGB BLD-MCNC: 14.6 G/DL — SIGNIFICANT CHANGE UP (ref 13–17)
MCHC RBC-ENTMCNC: 32.3 PG — SIGNIFICANT CHANGE UP (ref 27–34)
MCHC RBC-ENTMCNC: 33.1 G/DL — SIGNIFICANT CHANGE UP (ref 32–36)
MCV RBC AUTO: 97.6 FL — SIGNIFICANT CHANGE UP (ref 80–100)
NORMALIZED SCT PPP-RTO: 1.01 RATIO — SIGNIFICANT CHANGE UP (ref 0–1.16)
NORMALIZED SCT PPP-RTO: SIGNIFICANT CHANGE UP
NRBC BLD AUTO-RTO: 0 /100 WBCS — SIGNIFICANT CHANGE UP (ref 0–0)
PLATELET # BLD AUTO: 205 K/UL — SIGNIFICANT CHANGE UP (ref 150–400)
POTASSIUM SERPL-MCNC: 3.8 MMOL/L — SIGNIFICANT CHANGE UP (ref 3.5–5.3)
POTASSIUM SERPL-SCNC: 3.8 MMOL/L — SIGNIFICANT CHANGE UP (ref 3.5–5.3)
PROT SERPL-MCNC: 7.4 G/DL — SIGNIFICANT CHANGE UP (ref 6–8.3)
RBC # BLD: 4.52 M/UL — SIGNIFICANT CHANGE UP (ref 4.2–5.8)
RBC # FLD: 12.9 % — SIGNIFICANT CHANGE UP (ref 10.3–14.5)
SODIUM SERPL-SCNC: 137 MMOL/L — SIGNIFICANT CHANGE UP (ref 135–145)
WBC # BLD: 6.62 K/UL — SIGNIFICANT CHANGE UP (ref 3.8–10.5)
WBC # FLD AUTO: 6.62 K/UL — SIGNIFICANT CHANGE UP (ref 3.8–10.5)

## 2025-06-05 PROCEDURE — 99233 SBSQ HOSP IP/OBS HIGH 50: CPT

## 2025-06-05 PROCEDURE — 71045 X-RAY EXAM CHEST 1 VIEW: CPT | Mod: 26

## 2025-06-05 PROCEDURE — 93306 TTE W/DOPPLER COMPLETE: CPT | Mod: 26

## 2025-06-05 RX ADMIN — HEPARIN SODIUM 1600 UNIT(S)/HR: 1000 INJECTION INTRAVENOUS; SUBCUTANEOUS at 18:01

## 2025-06-05 RX ADMIN — DEXTROMETHORPHAN HBR, GUAIFENESIN 100 MILLIGRAM(S): 200 LIQUID ORAL at 18:19

## 2025-06-05 RX ADMIN — Medication 4 MILLILITER(S): at 00:15

## 2025-06-05 RX ADMIN — Medication 25 MILLIGRAM(S): at 06:11

## 2025-06-05 RX ADMIN — Medication 40 MILLIGRAM(S): at 06:12

## 2025-06-05 RX ADMIN — IPRATROPIUM BROMIDE AND ALBUTEROL SULFATE 3 MILLILITER(S): .5; 2.5 SOLUTION RESPIRATORY (INHALATION) at 12:26

## 2025-06-05 RX ADMIN — IPRATROPIUM BROMIDE AND ALBUTEROL SULFATE 3 MILLILITER(S): .5; 2.5 SOLUTION RESPIRATORY (INHALATION) at 00:15

## 2025-06-05 RX ADMIN — METOPROLOL SUCCINATE 25 MILLIGRAM(S): 50 TABLET, EXTENDED RELEASE ORAL at 06:11

## 2025-06-05 RX ADMIN — Medication 4 MILLILITER(S): at 18:21

## 2025-06-05 RX ADMIN — IPRATROPIUM BROMIDE AND ALBUTEROL SULFATE 3 MILLILITER(S): .5; 2.5 SOLUTION RESPIRATORY (INHALATION) at 06:12

## 2025-06-05 RX ADMIN — HEPARIN SODIUM 1600 UNIT(S)/HR: 1000 INJECTION INTRAVENOUS; SUBCUTANEOUS at 07:10

## 2025-06-05 RX ADMIN — Medication 4 MILLILITER(S): at 12:26

## 2025-06-05 RX ADMIN — HEPARIN SODIUM 1600 UNIT(S)/HR: 1000 INJECTION INTRAVENOUS; SUBCUTANEOUS at 10:37

## 2025-06-05 RX ADMIN — IPRATROPIUM BROMIDE AND ALBUTEROL SULFATE 3 MILLILITER(S): .5; 2.5 SOLUTION RESPIRATORY (INHALATION) at 18:22

## 2025-06-05 RX ADMIN — CLOPIDOGREL BISULFATE 75 MILLIGRAM(S): 75 TABLET, FILM COATED ORAL at 12:26

## 2025-06-05 RX ADMIN — Medication 4 MILLILITER(S): at 06:12

## 2025-06-05 RX ADMIN — ESCITALOPRAM OXALATE 10 MILLIGRAM(S): 20 TABLET ORAL at 12:26

## 2025-06-05 RX ADMIN — MIRABEGRON 25 MILLIGRAM(S): 50 TABLET, FILM COATED, EXTENDED RELEASE ORAL at 12:26

## 2025-06-05 RX ADMIN — Medication 100 MILLIGRAM(S): at 12:26

## 2025-06-05 RX ADMIN — LINACLOTIDE 72 MICROGRAM(S): 290 CAPSULE, GELATIN COATED ORAL at 06:11

## 2025-06-05 RX ADMIN — HEPARIN SODIUM 1600 UNIT(S)/HR: 1000 INJECTION INTRAVENOUS; SUBCUTANEOUS at 19:08

## 2025-06-05 RX ADMIN — ROSUVASTATIN CALCIUM 10 MILLIGRAM(S): 20 TABLET, FILM COATED ORAL at 21:30

## 2025-06-05 NOTE — PROGRESS NOTE ADULT - SUBJECTIVE AND OBJECTIVE BOX
Vascular Cardiology Consult Note     DIRECT PROVIDER NUMBER: [phone]  / Available on TEAMS    CC:  weakness and DVT     He is coughing   remains on heparin gtt  no chest pain      MEDICATIONS  (STANDING):  albuterol/ipratropium for Nebulization 3 milliLiter(s) Nebulizer every 6 hours  allopurinol 100 milliGRAM(s) Oral daily  clopidogrel Tablet 75 milliGRAM(s) Oral daily  escitalopram 10 milliGRAM(s) Oral daily  heparin  Infusion.  Unit(s)/Hr (18 mL/Hr) IV Continuous <Continuous>  hydrocodone/homatropine Syrup 5 milliLiter(s) Oral once  linaclotide 72 MICROGram(s) Oral before breakfast  metoprolol succinate ER 25 milliGRAM(s) Oral daily  mirabegron ER 25 milliGRAM(s) Oral daily  rosuvastatin 10 milliGRAM(s) Oral at bedtime  sodium chloride 3%  Inhalation 4 milliLiter(s) Inhalation every 6 hours  spironolactone 25 milliGRAM(s) Oral daily  torsemide 40 milliGRAM(s) Oral daily        PAST MEDICAL & SURGICAL HISTORY:  Chronic diastolic congestive heart failure  CAD (coronary artery disease)  No significant past surgical history    FAMILY HISTORY:  No pertinent family history in first degree relatives    SOCIAL HISTORY:  unchanged    REVIEW OF SYSTEMS:  CONSTITUTIONAL: No fever  EYES: No eye pain  ENT:  No throat pain  NECK: No pain   RESPIRATORY: SOB on admission    CARDIOVASCULAR:  No C/P  GASTROINTESTINAL: No abdominal pain  GENITOURINARY: No hematuria  NEUROLOGICAL: No memory loss  SKIN: No LE wounds  LYMPH Nodes: No enlarged glands noted  ENDOCRINE: No heat or cold intolerance noted  MUSCULOSKELETAL:  LE edema  PSYCHIATRIC: No depression, anxiety  HEME/LYMPH: No bleeding gums  ALLERGY AND IMMUNOLOGIC: No hives    [ x] All others negative	   ICU Vital Signs Last 24 Hrs  T(C): 36.8 (05 Jun 2025 05:06), Max: 36.9 (04 Jun 2025 16:03)  T(F): 98.2 (05 Jun 2025 05:06), Max: 98.5 (04 Jun 2025 16:03)  HR: 80 (05 Jun 2025 05:06) (80 - 88)  BP: 132/87 (05 Jun 2025 05:06) (118/85 - 143/87)  BP(mean): --  ABP: --  ABP(mean): --  RR: 18 (05 Jun 2025 05:06) (18 - 19)  SpO2: 94% (05 Jun 2025 05:06) (91% - 94%)    O2 Parameters below as of 05 Jun 2025 05:06  Patient On (Oxygen Delivery Method): nasal cannula  O2 Flow (L/min): 6          Appearance: Coughing 	  Cardiovascular: RRR, S1 and S2  Respiratory: Decreased BS Bases, junky anterior lung sounds   Psychiatry:  AAO x 3  Gastrointestinal:  Soft, Non-tender, + BS	  Skin: No cyanosis	  Extremities: Mild LE edema, bilateral calves soft   Foot Exam: No tissue loss                                      14.6   6.62  )-----------( 205      ( 05 Jun 2025 07:02 )             44.1       Assessment:  1. Bilateral PE  2. R LE DVT  3. HFpEF  4. Cough  5. CAD s/p PCI  6. HTN  7. History of Falls    Plan:  1. Cont heparin gtt for now  2.  Pulm eval appreciated - any role for a short course of steroids/abx?   3.  Cont diuretics  4.  echo pending  5.  Repeat xray chest please  6.  Will follow    Tony

## 2025-06-05 NOTE — PROGRESS NOTE ADULT - SUBJECTIVE AND OBJECTIVE BOX
[facility] Division of Hospital Medicine  Marizol Acosta MD  Available via MS Teams    SUBJECTIVE / OVERNIGHT EVENTS:  No acute events overnight. Mental status better this AM but still with cough, increased WOB.    MEDICATIONS  (STANDING):  albuterol/ipratropium for Nebulization 3 milliLiter(s) Nebulizer every 6 hours  allopurinol 100 milliGRAM(s) Oral daily  clopidogrel Tablet 75 milliGRAM(s) Oral daily  escitalopram 10 milliGRAM(s) Oral daily  heparin  Infusion.  Unit(s)/Hr (18 mL/Hr) IV Continuous <Continuous>  linaclotide 72 MICROGram(s) Oral before breakfast  metoprolol succinate ER 25 milliGRAM(s) Oral daily  mirabegron ER 25 milliGRAM(s) Oral daily  rosuvastatin 10 milliGRAM(s) Oral at bedtime  sodium chloride 3%  Inhalation 4 milliLiter(s) Inhalation every 6 hours  spironolactone 25 milliGRAM(s) Oral daily  torsemide 40 milliGRAM(s) Oral daily    MEDICATIONS  (PRN):  guaiFENesin Oral Liquid (Sugar-Free) 100 milliGRAM(s) Oral every 6 hours PRN Cough  heparin   Injectable 8000 Unit(s) IV Push every 6 hours PRN For aPTT less than 40  heparin   Injectable 4000 Unit(s) IV Push every 6 hours PRN For aPTT between 40 - 57      I&O's Summary    04 Jun 2025 07:01  -  05 Jun 2025 07:00  --------------------------------------------------------  IN: 0 mL / OUT: 650 mL / NET: -650 mL        PHYSICAL EXAM:  Vital Signs Last 24 Hrs  T(C): 36.8 (05 Jun 2025 05:06), Max: 36.9 (04 Jun 2025 16:03)  T(F): 98.2 (05 Jun 2025 05:06), Max: 98.5 (04 Jun 2025 16:03)  HR: 80 (05 Jun 2025 05:06) (80 - 84)  BP: 132/87 (05 Jun 2025 05:06) (126/82 - 143/87)  BP(mean): --  RR: 18 (05 Jun 2025 05:06) (18 - 19)  SpO2: 94% (05 Jun 2025 05:06) (92% - 94%)    Parameters below as of 05 Jun 2025 05:06  Patient On (Oxygen Delivery Method): nasal cannula  O2 Flow (L/min): 6    CONSTITUTIONAL: uncomfortable appearing, on NC  EYES: EOMI; conjunctiva and sclera clear  ENMT: Moist oral mucosa  RESPIRATORY: Mild inc WOB, scattered rhonchi, no wheezing  CARDIOVASCULAR: RRR, no murmurs, no LE edema  ABDOMEN: Nontender to palpation, mild distention, no rebound/guarding  PSYCH: A+O x3  NEUROLOGY: no FND    LABS:                        14.6   6.62  )-----------( 205      ( 05 Jun 2025 07:02 )             44.1     06-05    137  |  95[L]  |  33[H]  ----------------------------<  125[H]  3.8   |  26  |  1.22    Ca    9.5      05 Jun 2025 06:56    TPro  7.4  /  Alb  3.5  /  TBili  0.6  /  DBili  x   /  AST  22  /  ALT  13  /  AlkPhos  71  06-05    PTT - ( 05 Jun 2025 09:12 )  PTT:95.7 sec      Urinalysis Basic - ( 05 Jun 2025 06:56 )    Color: x / Appearance: x / SG: x / pH: x  Gluc: 125 mg/dL / Ketone: x  / Bili: x / Urobili: x   Blood: x / Protein: x / Nitrite: x   Leuk Esterase: x / RBC: x / WBC x   Sq Epi: x / Non Sq Epi: x / Bacteria: x            RADIOLOGY & ADDITIONAL TESTS:  New Imaging Personally Reviewed Today:  New Electrocardiogram Personally Reviewed Today:  Other Results Reviewed Today:   Prior or Outpatient Records Reviewed Today with Summary:    COORDINATION OF CARE:  Consultant Communication and Details of Discussion (where applicable):

## 2025-06-06 DIAGNOSIS — I62.00 NONTRAUMATIC SUBDURAL HEMORRHAGE, UNSPECIFIED: ICD-10-CM

## 2025-06-06 LAB
ADD ON TEST-SPECIMEN IN LAB: SIGNIFICANT CHANGE UP
ANION GAP SERPL CALC-SCNC: 16 MMOL/L — SIGNIFICANT CHANGE UP (ref 5–17)
APTT BLD: 77.5 SEC — HIGH (ref 26.1–36.8)
BUN SERPL-MCNC: 32 MG/DL — HIGH (ref 7–23)
CALCIUM SERPL-MCNC: 9.4 MG/DL — SIGNIFICANT CHANGE UP (ref 8.4–10.5)
CHLORIDE SERPL-SCNC: 94 MMOL/L — LOW (ref 96–108)
CO2 SERPL-SCNC: 26 MMOL/L — SIGNIFICANT CHANGE UP (ref 22–31)
CREAT SERPL-MCNC: 1.21 MG/DL — SIGNIFICANT CHANGE UP (ref 0.5–1.3)
CULTURE RESULTS: SIGNIFICANT CHANGE UP
CULTURE RESULTS: SIGNIFICANT CHANGE UP
EGFR: 60 ML/MIN/1.73M2 — SIGNIFICANT CHANGE UP
EGFR: 60 ML/MIN/1.73M2 — SIGNIFICANT CHANGE UP
GAS PNL BLDV: SIGNIFICANT CHANGE UP
GLUCOSE SERPL-MCNC: 128 MG/DL — HIGH (ref 70–99)
HCT VFR BLD CALC: 42 % — SIGNIFICANT CHANGE UP (ref 39–50)
HGB BLD-MCNC: 14.5 G/DL — SIGNIFICANT CHANGE UP (ref 13–17)
MCHC RBC-ENTMCNC: 32.9 PG — SIGNIFICANT CHANGE UP (ref 27–34)
MCHC RBC-ENTMCNC: 34.5 G/DL — SIGNIFICANT CHANGE UP (ref 32–36)
MCV RBC AUTO: 95.2 FL — SIGNIFICANT CHANGE UP (ref 80–100)
NRBC BLD AUTO-RTO: 0 /100 WBCS — SIGNIFICANT CHANGE UP (ref 0–0)
NT-PROBNP SERPL-SCNC: 335 PG/ML — HIGH (ref 0–300)
PLATELET # BLD AUTO: 218 K/UL — SIGNIFICANT CHANGE UP (ref 150–400)
POTASSIUM SERPL-MCNC: 3.6 MMOL/L — SIGNIFICANT CHANGE UP (ref 3.5–5.3)
POTASSIUM SERPL-SCNC: 3.6 MMOL/L — SIGNIFICANT CHANGE UP (ref 3.5–5.3)
RBC # BLD: 4.41 M/UL — SIGNIFICANT CHANGE UP (ref 4.2–5.8)
RBC # FLD: 12.5 % — SIGNIFICANT CHANGE UP (ref 10.3–14.5)
SODIUM SERPL-SCNC: 136 MMOL/L — SIGNIFICANT CHANGE UP (ref 135–145)
SPECIMEN SOURCE: SIGNIFICANT CHANGE UP
SPECIMEN SOURCE: SIGNIFICANT CHANGE UP
WBC # BLD: 5.86 K/UL — SIGNIFICANT CHANGE UP (ref 3.8–10.5)
WBC # FLD AUTO: 5.86 K/UL — SIGNIFICANT CHANGE UP (ref 3.8–10.5)

## 2025-06-06 PROCEDURE — 99233 SBSQ HOSP IP/OBS HIGH 50: CPT

## 2025-06-06 PROCEDURE — 93970 EXTREMITY STUDY: CPT | Mod: 26

## 2025-06-06 PROCEDURE — 70450 CT HEAD/BRAIN W/O DYE: CPT | Mod: 26

## 2025-06-06 RX ORDER — TORSEMIDE 10 MG
40 TABLET ORAL DAILY
Refills: 0 | Status: DISCONTINUED | OUTPATIENT
Start: 2025-06-07 | End: 2025-06-12

## 2025-06-06 RX ORDER — FUROSEMIDE 10 MG/ML
40 INJECTION INTRAMUSCULAR; INTRAVENOUS
Refills: 0 | Status: DISCONTINUED | OUTPATIENT
Start: 2025-06-06 | End: 2025-06-06

## 2025-06-06 RX ADMIN — MIRABEGRON 25 MILLIGRAM(S): 50 TABLET, FILM COATED, EXTENDED RELEASE ORAL at 11:11

## 2025-06-06 RX ADMIN — IPRATROPIUM BROMIDE AND ALBUTEROL SULFATE 3 MILLILITER(S): .5; 2.5 SOLUTION RESPIRATORY (INHALATION) at 05:53

## 2025-06-06 RX ADMIN — Medication 100 MILLIGRAM(S): at 11:12

## 2025-06-06 RX ADMIN — Medication 4 MILLILITER(S): at 11:12

## 2025-06-06 RX ADMIN — IPRATROPIUM BROMIDE AND ALBUTEROL SULFATE 3 MILLILITER(S): .5; 2.5 SOLUTION RESPIRATORY (INHALATION) at 11:12

## 2025-06-06 RX ADMIN — HEPARIN SODIUM 1600 UNIT(S)/HR: 1000 INJECTION INTRAVENOUS; SUBCUTANEOUS at 07:08

## 2025-06-06 RX ADMIN — Medication 4 MILLILITER(S): at 19:01

## 2025-06-06 RX ADMIN — CLOPIDOGREL BISULFATE 75 MILLIGRAM(S): 75 TABLET, FILM COATED ORAL at 11:12

## 2025-06-06 RX ADMIN — ROSUVASTATIN CALCIUM 10 MILLIGRAM(S): 20 TABLET, FILM COATED ORAL at 22:59

## 2025-06-06 RX ADMIN — Medication 4 MILLILITER(S): at 00:24

## 2025-06-06 RX ADMIN — IPRATROPIUM BROMIDE AND ALBUTEROL SULFATE 3 MILLILITER(S): .5; 2.5 SOLUTION RESPIRATORY (INHALATION) at 19:01

## 2025-06-06 RX ADMIN — DEXTROMETHORPHAN HBR, GUAIFENESIN 100 MILLIGRAM(S): 200 LIQUID ORAL at 00:24

## 2025-06-06 RX ADMIN — LINACLOTIDE 72 MICROGRAM(S): 290 CAPSULE, GELATIN COATED ORAL at 05:53

## 2025-06-06 RX ADMIN — FUROSEMIDE 40 MILLIGRAM(S): 10 INJECTION INTRAMUSCULAR; INTRAVENOUS at 13:28

## 2025-06-06 RX ADMIN — IPRATROPIUM BROMIDE AND ALBUTEROL SULFATE 3 MILLILITER(S): .5; 2.5 SOLUTION RESPIRATORY (INHALATION) at 00:24

## 2025-06-06 RX ADMIN — Medication 25 MILLIGRAM(S): at 05:53

## 2025-06-06 RX ADMIN — METOPROLOL SUCCINATE 25 MILLIGRAM(S): 50 TABLET, EXTENDED RELEASE ORAL at 05:53

## 2025-06-06 RX ADMIN — HEPARIN SODIUM 1600 UNIT(S)/HR: 1000 INJECTION INTRAVENOUS; SUBCUTANEOUS at 07:47

## 2025-06-06 RX ADMIN — ESCITALOPRAM OXALATE 10 MILLIGRAM(S): 20 TABLET ORAL at 11:11

## 2025-06-06 RX ADMIN — Medication 40 MILLIGRAM(S): at 05:53

## 2025-06-06 RX ADMIN — DEXTROMETHORPHAN HBR, GUAIFENESIN 100 MILLIGRAM(S): 200 LIQUID ORAL at 06:28

## 2025-06-06 RX ADMIN — Medication 4 MILLILITER(S): at 05:53

## 2025-06-06 NOTE — PROGRESS NOTE ADULT - SUBJECTIVE AND OBJECTIVE BOX
[facility] Division of Hospital Medicine  Marizol Acosta MD  Available via MS Teams    SUBJECTIVE / OVERNIGHT EVENTS:  No acute events overnight. Weaned to 5L NC. Decreased cough. Newly noted R facial droop this AM. CTH with SDH, heparin gtt held. Although per ex-wife at bedside, R facial droop is chronic.     MEDICATIONS  (STANDING):  albuterol/ipratropium for Nebulization 3 milliLiter(s) Nebulizer every 6 hours  allopurinol 100 milliGRAM(s) Oral daily  escitalopram 10 milliGRAM(s) Oral daily  linaclotide 72 MICROGram(s) Oral before breakfast  metoprolol succinate ER 25 milliGRAM(s) Oral daily  mirabegron ER 25 milliGRAM(s) Oral daily  rosuvastatin 10 milliGRAM(s) Oral at bedtime  sodium chloride 3%  Inhalation 4 milliLiter(s) Inhalation every 6 hours  spironolactone 25 milliGRAM(s) Oral daily    MEDICATIONS  (PRN):  guaiFENesin Oral Liquid (Sugar-Free) 100 milliGRAM(s) Oral every 6 hours PRN Cough      I&O's Summary    05 Jun 2025 07:01  -  06 Jun 2025 07:00  --------------------------------------------------------  IN: 0 mL / OUT: 1 mL / NET: -1 mL    06 Jun 2025 07:01  -  06 Jun 2025 17:05  --------------------------------------------------------  IN: 0 mL / OUT: 300 mL / NET: -300 mL        PHYSICAL EXAM:  Vital Signs Last 24 Hrs  T(C): 36.2 (06 Jun 2025 16:14), Max: 37 (06 Jun 2025 00:39)  T(F): 97.2 (06 Jun 2025 16:14), Max: 98.6 (06 Jun 2025 00:39)  HR: 76 (06 Jun 2025 16:14) (58 - 80)  BP: 114/75 (06 Jun 2025 16:14) (114/75 - 154/98)  BP(mean): --  RR: 18 (06 Jun 2025 16:14) (18 - 18)  SpO2: 93% (06 Jun 2025 16:14) (93% - 97%)    Parameters below as of 06 Jun 2025 16:14  Patient On (Oxygen Delivery Method): nasal cannula  O2 Flow (L/min): 5    CONSTITUTIONAL: uncomfortable appearing, on NC  EYES: EOMI; conjunctiva and sclera clear  ENMT: Moist oral mucosa  RESPIRATORY: Mild inc WOB, scattered rhonchi, no wheezing  CARDIOVASCULAR: RRR, no murmurs, no LE edema  ABDOMEN: Nontender to palpation, mild distention, no rebound/guarding  PSYCH: A+O x3  NEUROLOGY: mild R facial droop    LABS:                        14.5   5.86  )-----------( 218      ( 06 Jun 2025 07:24 )             42.0     06-06    136  |  94[L]  |  32[H]  ----------------------------<  128[H]  3.6   |  26  |  1.21    Ca    9.4      06 Jun 2025 07:24    TPro  7.4  /  Alb  3.5  /  TBili  0.6  /  DBili  x   /  AST  22  /  ALT  13  /  AlkPhos  71  06-05    PTT - ( 06 Jun 2025 07:24 )  PTT:77.5 sec      Urinalysis Basic - ( 06 Jun 2025 07:24 )    Color: x / Appearance: x / SG: x / pH: x  Gluc: 128 mg/dL / Ketone: x  / Bili: x / Urobili: x   Blood: x / Protein: x / Nitrite: x   Leuk Esterase: x / RBC: x / WBC x   Sq Epi: x / Non Sq Epi: x / Bacteria: x            RADIOLOGY & ADDITIONAL TESTS:  New Imaging Personally Reviewed Today:  New Electrocardiogram Personally Reviewed Today:  Other Results Reviewed Today:   Prior or Outpatient Records Reviewed Today with Summary:    COORDINATION OF CARE:  Consultant Communication and Details of Discussion (where applicable):

## 2025-06-06 NOTE — PROGRESS NOTE ADULT - PROBLEM SELECTOR PLAN 6
f/u with [facility] outpatient. Unclear if reduced or preserved EF. Has struggled with chronic LE edema, unclear how much is 2/2 to HF vs Chronic venous insufficieny (per outpatient notes appears more likely the later)   -on torsemide 40mg outpatient, resumed  -continue spironolactone 25mg daily  -continue metop 25mg daily  -previously on ace, off currently  -TTE

## 2025-06-06 NOTE — PROGRESS NOTE ADULT - NS ATTEND AMEND GEN_ALL_CORE FT
PT seen on 6/7/25 with family at bedside.  Pt is alert, awake in NAD.  Agree with above PA evaluation and assessment.  Pt with PE on heparin.  Pt had new left parafalcine small subdural hemorrhage  potentially developed after anticoagulation was started.  Repeat was stable and pt was resumed on heparin because pt was also found to have a left ventricular thrombus.  Pt is BALLESTEROS with no drift.  Discussed with Pt risks and benefits of anticoagulation and will need to monitor subdural while therapeutic on anticoagulation.  Precautions given.

## 2025-06-06 NOTE — PROVIDER CONTACT NOTE (OTHER) - ASSESSMENT
Pt has no other stroke deficits
patient is AO3, 6LNC, denies chest pain, no distress noted at the moment.

## 2025-06-06 NOTE — PROGRESS NOTE ADULT - SUBJECTIVE AND OBJECTIVE BOX
83 y/o male hx HTN, HLD, CAD w/ stents (10 years ago), CHF, admitted for falls, hypoxia, PE. Seen by primary team concerning for new R facial droop. CTH done showing small left frontal parafalcine subdural hemorrhage. Pt states R facial droop is old    Exam: AOx3, FC, PERRL, R facial(baseline), no drift, BALLESTEROS 5/5 06-06    136  |  94[L]  |  32[H]  ----------------------------<  128[H]  3.6   |  26  |  1.21    Ca    9.4      06 Jun 2025 07:24    TPro  7.4  /  Alb  3.5  /  TBili  0.6  /  DBili  x   /  AST  22  /  ALT  13  /  AlkPhos  71  06-05                        14.5   5.86  )-----------( 218      ( 06 Jun 2025 07:24 )             42.0     < from: CT Head No Cont (06.06.25 @ 10:24) >    IMPRESSION: Age-appropriate involutional and ischemic gliotic changes. No   parenchymal hemorrhage. Small amount of left frontal parafalcine subdural   hemorrhage new since 5/31/2025.    < end of copied text >

## 2025-06-06 NOTE — PROGRESS NOTE ADULT - SUBJECTIVE AND OBJECTIVE BOX
Vascular Cardiology Consult Note     DIRECT PROVIDER NUMBER: [phone]  / Available on TEAMS    CC:  weakness and DVT    Interval Events:  Off Heparin gtt due to SDH seen on CT head this AM.  TTE showed LVEF 46% and LV thrombus.  Breathing improved. BNP  to below 400.     MEDICATIONS  (STANDING):  albuterol/ipratropium for Nebulization 3 milliLiter(s) Nebulizer every 6 hours  allopurinol 100 milliGRAM(s) Oral daily  escitalopram 10 milliGRAM(s) Oral daily  linaclotide 72 MICROGram(s) Oral before breakfast  metoprolol succinate ER 25 milliGRAM(s) Oral daily  mirabegron ER 25 milliGRAM(s) Oral daily  rosuvastatin 10 milliGRAM(s) Oral at bedtime  sodium chloride 3%  Inhalation 4 milliLiter(s) Inhalation every 6 hours  spironolactone 25 milliGRAM(s) Oral daily    PAST MEDICAL & SURGICAL HISTORY:  Chronic diastolic congestive heart failure  CAD (coronary artery disease)  No significant past surgical history    FAMILY HISTORY:  No pertinent family history in first degree relatives    SOCIAL HISTORY:  unchanged    REVIEW OF SYSTEMS:  CONSTITUTIONAL: No fever  EYES: No eye pain  ENT:  No throat pain  NECK: No pain   RESPIRATORY: SOB on admission    CARDIOVASCULAR:  No C/P  GASTROINTESTINAL: No abdominal pain  GENITOURINARY: No hematuria  NEUROLOGICAL: No memory loss  SKIN: No LE wounds  LYMPH Nodes: No enlarged glands noted  ENDOCRINE: No heat or cold intolerance noted  MUSCULOSKELETAL:  LE edema  PSYCHIATRIC: No depression, anxiety  HEME/LYMPH: No bleeding gums  ALLERGY AND IMMUNOLOGIC: No hives    [ x] All others negative	    Vital Signs Last 24 Hrs  T(C): 36.2 (2025 16:14), Max: 37 (2025 00:39)  T(F): 97.2 (2025 16:14), Max: 98.6 (2025 00:39)  HR: 76 (2025 16:14) (58 - 80)  BP: 114/75 (2025 16:14) (114/75 - 154/98)  BP(mean): --  RR: 18 (2025 16:14) (18 - 18)  SpO2: 93% (2025 16:14) (93% - 97%)    Parameters below as of 2025 16:14  Patient On (Oxygen Delivery Method): nasal cannula  O2 Flow (L/min): 5    Appearance: Coughing 	  Cardiovascular: RRR, S1 and S2  Respiratory: Decreased BS Bases  Psychiatry:  AAO x 3  Gastrointestinal:  Soft, Non-tender, + BS	  Skin: No cyanosis	  Extremities: Mild LE edema, bilateral calves soft   Foot Exam: No tissue loss               Labs:                        14.5   5.86  )-----------( 218      ( 2025 07:24 )             42.0     06-    136  |  94[L]  |  32[H]  ----------------------------<  128[H]  3.6   |  26  |  1.21    Ca    9.4      2025 07:24    TPro  7.4  /  Alb  3.5  /  TBili  0.6  /  DBili  x   /  AST  22  /  ALT  13  /  AlkPhos  71  06-05    PTT - ( 2025 07:24 )  PTT:77.5 sec    Urinalysis Basic - ( 2025 07:24 )    Color: x / Appearance: x / SG: x / pH: x  Gluc: 128 mg/dL / Ketone: x  / Bili: x / Urobili: x   Blood: x / Protein: x / Nitrite: x   Leuk Esterase: x / RBC: x / WBC x   Sq Epi: x / Non Sq Epi: x / Bacteria: x      Assessment:  1. Bilateral PE  2. R LE DVT  3. HFpEF  4. Cough  5. CAD s/p PCI  6. HTN  7. History of Falls  8. EF 46%    Plan:  1. Patient currently off of Heparin gtt due to SDH noted on CT head this AM.  2. Appreciate Neurosurgical evaluation.  3. Plan repeat CT head and re-evaluation tomorrow if anticoagulation is safe from a neurosurgical perspective.  4. Once safe can resume pharmacological DVT PPX and then eventual therapeutic anticoagulation for LV thrombus and PE.  5. Volume status improving diuretics. BNP decreased.   6. Appreciate Pulmonary and Medicine.     Thank you  STEVEN Bryant, MS, [facility]  Vascular Cardiology Service    Please call with any questions:   DIRECT SERVICE NUMBER: [phone] / Available on TEAMS   Vascular Cardiology Consult Note     DIRECT PROVIDER NUMBER: [phone]  / Available on TEAMS    CC:  weakness and DVT    Interval Events:  Off Heparin gtt due to SDH seen on CT head this AM.  TTE showed LVEF 46% and LV thrombus.  Breathing improved. BNP  to below 400.     MEDICATIONS  (STANDING):  albuterol/ipratropium for Nebulization 3 milliLiter(s) Nebulizer every 6 hours  allopurinol 100 milliGRAM(s) Oral daily  escitalopram 10 milliGRAM(s) Oral daily  linaclotide 72 MICROGram(s) Oral before breakfast  metoprolol succinate ER 25 milliGRAM(s) Oral daily  mirabegron ER 25 milliGRAM(s) Oral daily  rosuvastatin 10 milliGRAM(s) Oral at bedtime  sodium chloride 3%  Inhalation 4 milliLiter(s) Inhalation every 6 hours  spironolactone 25 milliGRAM(s) Oral daily    PAST MEDICAL & SURGICAL HISTORY:  Chronic diastolic congestive heart failure  CAD (coronary artery disease)  No significant past surgical history    FAMILY HISTORY:  No pertinent family history in first degree relatives    SOCIAL HISTORY:  unchanged    REVIEW OF SYSTEMS:  CONSTITUTIONAL: No fever  EYES: No eye pain  ENT:  No throat pain  NECK: No pain   RESPIRATORY: SOB on admission    CARDIOVASCULAR:  No C/P  GASTROINTESTINAL: No abdominal pain  GENITOURINARY: No hematuria  NEUROLOGICAL: No memory loss  SKIN: No LE wounds  LYMPH Nodes: No enlarged glands noted  ENDOCRINE: No heat or cold intolerance noted  MUSCULOSKELETAL:  LE edema  PSYCHIATRIC: No depression, anxiety  HEME/LYMPH: No bleeding gums  ALLERGY AND IMMUNOLOGIC: No hives    [ x] All others negative	    Vital Signs Last 24 Hrs  T(C): 36.2 (2025 16:14), Max: 37 (2025 00:39)  T(F): 97.2 (2025 16:14), Max: 98.6 (2025 00:39)  HR: 76 (2025 16:14) (58 - 80)  BP: 114/75 (2025 16:14) (114/75 - 154/98)  BP(mean): --  RR: 18 (2025 16:14) (18 - 18)  SpO2: 93% (2025 16:14) (93% - 97%)    Parameters below as of 2025 16:14  Patient On (Oxygen Delivery Method): nasal cannula  O2 Flow (L/min): 5    Appearance: Coughing 	  Cardiovascular: RRR, S1 and S2  Respiratory: Decreased BS Bases  Psychiatry:  AAO x 3  Gastrointestinal:  Soft, Non-tender, + BS	  Skin: No cyanosis	  Extremities: Mild LE edema, bilateral calves soft   Foot Exam: No tissue loss               Labs:                        14.5   5.86  )-----------( 218      ( 2025 07:24 )             42.0     06-    136  |  94[L]  |  32[H]  ----------------------------<  128[H]  3.6   |  26  |  1.21    Ca    9.4      2025 07:24    TPro  7.4  /  Alb  3.5  /  TBili  0.6  /  DBili  x   /  AST  22  /  ALT  13  /  AlkPhos  71  06-05    PTT - ( 2025 07:24 )  PTT:77.5 sec    Urinalysis Basic - ( 2025 07:24 )    Color: x / Appearance: x / SG: x / pH: x  Gluc: 128 mg/dL / Ketone: x  / Bili: x / Urobili: x   Blood: x / Protein: x / Nitrite: x   Leuk Esterase: x / RBC: x / WBC x   Sq Epi: x / Non Sq Epi: x / Bacteria: x      Assessment:  1. Bilateral PE  2. R LE DVT  3. HFpEF  4. Cough  5. CAD s/p PCI  6. HTN  7. History of Falls  8. EF 46%    Plan:  1. Patient currently off of Heparin gtt due to SDH noted on CT head this AM.  2. Appreciate Neurosurgical evaluation.  3. Plan repeat CT head and re-evaluation tomorrow if anticoagulation is safe from a neurosurgical perspective.  4. Once safe can resume pharmacological DVT PPX and then eventual therapeutic anticoagulation for LV thrombus and PE.  5. Volume status improving diuretics. BNP decreased.   6. Appreciate Pulmonary and Medicine.   7. Recommend repeat surveillance LE venous duplex (as patient currently off anticoagulation).     Thank you  STEVEN Bryant, MS, [facility]  Vascular Cardiology Service    Please call with any questions:   DIRECT SERVICE NUMBER: [phone] / Available on TEAMS   Vascular Cardiology Consult Note     DIRECT PROVIDER NUMBER: [phone]  / Available on TEAMS    CC:  weakness and DVT    Interval Events:  Off Heparin gtt due to SDH seen on CT head this AM.  TTE showed LVEF 46% and LV thrombus.  Breathing improved. BNP  to below 400.     MEDICATIONS  (STANDING):  albuterol/ipratropium for Nebulization 3 milliLiter(s) Nebulizer every 6 hours  allopurinol 100 milliGRAM(s) Oral daily  escitalopram 10 milliGRAM(s) Oral daily  linaclotide 72 MICROGram(s) Oral before breakfast  metoprolol succinate ER 25 milliGRAM(s) Oral daily  mirabegron ER 25 milliGRAM(s) Oral daily  rosuvastatin 10 milliGRAM(s) Oral at bedtime  sodium chloride 3%  Inhalation 4 milliLiter(s) Inhalation every 6 hours  spironolactone 25 milliGRAM(s) Oral daily    PAST MEDICAL & SURGICAL HISTORY:  Chronic diastolic congestive heart failure  CAD (coronary artery disease)  No significant past surgical history    FAMILY HISTORY:  No pertinent family history in first degree relatives    SOCIAL HISTORY:  unchanged    REVIEW OF SYSTEMS:  CONSTITUTIONAL: No fever  EYES: No eye pain  ENT:  No throat pain  NECK: No pain   RESPIRATORY: SOB on admission    CARDIOVASCULAR:  No C/P  GASTROINTESTINAL: No abdominal pain  GENITOURINARY: No hematuria  NEUROLOGICAL: No memory loss  SKIN: No LE wounds  LYMPH Nodes: No enlarged glands noted  ENDOCRINE: No heat or cold intolerance noted  MUSCULOSKELETAL:  LE edema  PSYCHIATRIC: No depression, anxiety  HEME/LYMPH: No bleeding gums  ALLERGY AND IMMUNOLOGIC: No hives    [ x] All others negative	    Vital Signs Last 24 Hrs  T(C): 36.2 (2025 16:14), Max: 37 (2025 00:39)  T(F): 97.2 (2025 16:14), Max: 98.6 (2025 00:39)  HR: 76 (2025 16:14) (58 - 80)  BP: 114/75 (2025 16:14) (114/75 - 154/98)  BP(mean): --  RR: 18 (2025 16:14) (18 - 18)  SpO2: 93% (2025 16:14) (93% - 97%)    Parameters below as of 2025 16:14  Patient On (Oxygen Delivery Method): nasal cannula  O2 Flow (L/min): 5    Appearance: Coughing 	  Cardiovascular: RRR, S1 and S2  Respiratory: Decreased BS Bases  Psychiatry:  AAO x 3  Gastrointestinal:  Soft, Non-tender, + BS	  Skin: No cyanosis	  Extremities: Mild LE edema, bilateral calves soft   Foot Exam: No tissue loss               Labs:                        14.5   5.86  )-----------( 218      ( 2025 07:24 )             42.0     06-    136  |  94[L]  |  32[H]  ----------------------------<  128[H]  3.6   |  26  |  1.21    Ca    9.4      2025 07:24    TPro  7.4  /  Alb  3.5  /  TBili  0.6  /  DBili  x   /  AST  22  /  ALT  13  /  AlkPhos  71  06-05    PTT - ( 2025 07:24 )  PTT:77.5 sec    Urinalysis Basic - ( 2025 07:24 )    Color: x / Appearance: x / SG: x / pH: x  Gluc: 128 mg/dL / Ketone: x  / Bili: x / Urobili: x   Blood: x / Protein: x / Nitrite: x   Leuk Esterase: x / RBC: x / WBC x   Sq Epi: x / Non Sq Epi: x / Bacteria: x      Assessment:  1. Bilateral PE  2. R LE DVT  3. HFpEF  4. Cough  5. CAD s/p PCI  6. HTN  7. History of Falls  8. EF 46%    Plan:  1. Patient currently off of Heparin gtt due to SDH noted on CT head this AM.  2. Appreciate Neurosurgical evaluation.  3. Plan repeat CT head and re-evaluation tomorrow if anticoagulation is safe from a neurosurgical perspective.  4. Once safe can resume pharmacological DVT PPX and then eventual therapeutic anticoagulation for LV thrombus and PE.  5. Volume status improving diuretics. BNP decreased.   6. Appreciate Pulmonary and Medicine.   7. Pneumatic compression to L lower extremity (none DVT leg).   8. Recommend repeat surveillance LE venous duplex (as patient currently off anticoagulation).     Thank you  STEVEN Bryant, MS, [facility]  Vascular Cardiology Service    Please call with any questions:   DIRECT SERVICE NUMBER: [phone] / Available on TEAMS

## 2025-06-07 LAB
ANION GAP SERPL CALC-SCNC: 15 MMOL/L — SIGNIFICANT CHANGE UP (ref 5–17)
APTT BLD: 26.7 SEC — SIGNIFICANT CHANGE UP (ref 26.1–36.8)
APTT BLD: 72.2 SEC — HIGH (ref 26.1–36.8)
BUN SERPL-MCNC: 33 MG/DL — HIGH (ref 7–23)
CALCIUM SERPL-MCNC: 9.6 MG/DL — SIGNIFICANT CHANGE UP (ref 8.4–10.5)
CHLORIDE SERPL-SCNC: 93 MMOL/L — LOW (ref 96–108)
CO2 SERPL-SCNC: 27 MMOL/L — SIGNIFICANT CHANGE UP (ref 22–31)
CREAT SERPL-MCNC: 1.3 MG/DL — SIGNIFICANT CHANGE UP (ref 0.5–1.3)
EGFR: 55 ML/MIN/1.73M2 — LOW
EGFR: 55 ML/MIN/1.73M2 — LOW
GLUCOSE SERPL-MCNC: 116 MG/DL — HIGH (ref 70–99)
HCT VFR BLD CALC: 41.3 % — SIGNIFICANT CHANGE UP (ref 39–50)
HCT VFR BLD CALC: 41.8 % — SIGNIFICANT CHANGE UP (ref 39–50)
HGB BLD-MCNC: 14.2 G/DL — SIGNIFICANT CHANGE UP (ref 13–17)
HGB BLD-MCNC: 14.6 G/DL — SIGNIFICANT CHANGE UP (ref 13–17)
INR BLD: 1.04 RATIO — SIGNIFICANT CHANGE UP (ref 0.85–1.16)
MCHC RBC-ENTMCNC: 32.2 PG — SIGNIFICANT CHANGE UP (ref 27–34)
MCHC RBC-ENTMCNC: 33 PG — SIGNIFICANT CHANGE UP (ref 27–34)
MCHC RBC-ENTMCNC: 34 G/DL — SIGNIFICANT CHANGE UP (ref 32–36)
MCHC RBC-ENTMCNC: 35.4 G/DL — SIGNIFICANT CHANGE UP (ref 32–36)
MCV RBC AUTO: 93.2 FL — SIGNIFICANT CHANGE UP (ref 80–100)
MCV RBC AUTO: 94.8 FL — SIGNIFICANT CHANGE UP (ref 80–100)
NRBC BLD AUTO-RTO: 0 /100 WBCS — SIGNIFICANT CHANGE UP (ref 0–0)
NRBC BLD AUTO-RTO: 0 /100 WBCS — SIGNIFICANT CHANGE UP (ref 0–0)
PLATELET # BLD AUTO: 232 K/UL — SIGNIFICANT CHANGE UP (ref 150–400)
PLATELET # BLD AUTO: 265 K/UL — SIGNIFICANT CHANGE UP (ref 150–400)
POTASSIUM SERPL-MCNC: 3.7 MMOL/L — SIGNIFICANT CHANGE UP (ref 3.5–5.3)
POTASSIUM SERPL-SCNC: 3.7 MMOL/L — SIGNIFICANT CHANGE UP (ref 3.5–5.3)
PROTHROM AB SERPL-ACNC: 11.8 SEC — SIGNIFICANT CHANGE UP (ref 9.9–13.4)
RBC # BLD: 4.41 M/UL — SIGNIFICANT CHANGE UP (ref 4.2–5.8)
RBC # BLD: 4.43 M/UL — SIGNIFICANT CHANGE UP (ref 4.2–5.8)
RBC # FLD: 12.3 % — SIGNIFICANT CHANGE UP (ref 10.3–14.5)
RBC # FLD: 12.4 % — SIGNIFICANT CHANGE UP (ref 10.3–14.5)
SODIUM SERPL-SCNC: 135 MMOL/L — SIGNIFICANT CHANGE UP (ref 135–145)
WBC # BLD: 7.08 K/UL — SIGNIFICANT CHANGE UP (ref 3.8–10.5)
WBC # BLD: 8.07 K/UL — SIGNIFICANT CHANGE UP (ref 3.8–10.5)
WBC # FLD AUTO: 7.08 K/UL — SIGNIFICANT CHANGE UP (ref 3.8–10.5)
WBC # FLD AUTO: 8.07 K/UL — SIGNIFICANT CHANGE UP (ref 3.8–10.5)

## 2025-06-07 PROCEDURE — 99233 SBSQ HOSP IP/OBS HIGH 50: CPT

## 2025-06-07 PROCEDURE — 99222 1ST HOSP IP/OBS MODERATE 55: CPT

## 2025-06-07 PROCEDURE — 70450 CT HEAD/BRAIN W/O DYE: CPT | Mod: 26,77

## 2025-06-07 PROCEDURE — 70450 CT HEAD/BRAIN W/O DYE: CPT | Mod: 26

## 2025-06-07 RX ORDER — HEPARIN SODIUM 1000 [USP'U]/ML
1800 INJECTION INTRAVENOUS; SUBCUTANEOUS
Qty: 25000 | Refills: 0 | Status: DISCONTINUED | OUTPATIENT
Start: 2025-06-07 | End: 2025-06-09

## 2025-06-07 RX ADMIN — Medication 100 MILLIGRAM(S): at 12:06

## 2025-06-07 RX ADMIN — LINACLOTIDE 72 MICROGRAM(S): 290 CAPSULE, GELATIN COATED ORAL at 06:35

## 2025-06-07 RX ADMIN — Medication 25 MILLIGRAM(S): at 06:36

## 2025-06-07 RX ADMIN — IPRATROPIUM BROMIDE AND ALBUTEROL SULFATE 3 MILLILITER(S): .5; 2.5 SOLUTION RESPIRATORY (INHALATION) at 00:05

## 2025-06-07 RX ADMIN — HEPARIN SODIUM 15 UNIT(S)/HR: 1000 INJECTION INTRAVENOUS; SUBCUTANEOUS at 13:49

## 2025-06-07 RX ADMIN — ROSUVASTATIN CALCIUM 10 MILLIGRAM(S): 20 TABLET, FILM COATED ORAL at 22:07

## 2025-06-07 RX ADMIN — HEPARIN SODIUM 14.5 UNIT(S)/HR: 1000 INJECTION INTRAVENOUS; SUBCUTANEOUS at 20:55

## 2025-06-07 RX ADMIN — ESCITALOPRAM OXALATE 10 MILLIGRAM(S): 20 TABLET ORAL at 12:06

## 2025-06-07 RX ADMIN — HEPARIN SODIUM 15 UNIT(S)/HR: 1000 INJECTION INTRAVENOUS; SUBCUTANEOUS at 19:02

## 2025-06-07 RX ADMIN — Medication 4 MILLILITER(S): at 00:05

## 2025-06-07 RX ADMIN — Medication 4 MILLILITER(S): at 06:36

## 2025-06-07 RX ADMIN — Medication 4 MILLILITER(S): at 18:50

## 2025-06-07 RX ADMIN — IPRATROPIUM BROMIDE AND ALBUTEROL SULFATE 3 MILLILITER(S): .5; 2.5 SOLUTION RESPIRATORY (INHALATION) at 06:36

## 2025-06-07 RX ADMIN — IPRATROPIUM BROMIDE AND ALBUTEROL SULFATE 3 MILLILITER(S): .5; 2.5 SOLUTION RESPIRATORY (INHALATION) at 18:50

## 2025-06-07 RX ADMIN — Medication 40 MILLIGRAM(S): at 06:35

## 2025-06-07 RX ADMIN — IPRATROPIUM BROMIDE AND ALBUTEROL SULFATE 3 MILLILITER(S): .5; 2.5 SOLUTION RESPIRATORY (INHALATION) at 12:07

## 2025-06-07 RX ADMIN — IPRATROPIUM BROMIDE AND ALBUTEROL SULFATE 3 MILLILITER(S): .5; 2.5 SOLUTION RESPIRATORY (INHALATION) at 23:07

## 2025-06-07 RX ADMIN — METOPROLOL SUCCINATE 25 MILLIGRAM(S): 50 TABLET, EXTENDED RELEASE ORAL at 06:36

## 2025-06-07 RX ADMIN — Medication 4 MILLILITER(S): at 23:07

## 2025-06-07 RX ADMIN — MIRABEGRON 25 MILLIGRAM(S): 50 TABLET, FILM COATED, EXTENDED RELEASE ORAL at 12:06

## 2025-06-07 RX ADMIN — Medication 4 MILLILITER(S): at 12:07

## 2025-06-07 RX ADMIN — DEXTROMETHORPHAN HBR, GUAIFENESIN 100 MILLIGRAM(S): 200 LIQUID ORAL at 10:45

## 2025-06-07 NOTE — PROGRESS NOTE ADULT - SUBJECTIVE AND OBJECTIVE BOX
[facility] Division of Hospital Medicine  Marizol Acosta MD  Available via MS Teams    SUBJECTIVE / OVERNIGHT EVENTS:  No acute events overnight. CTH stable this AM. Pt feels breathing is better than yesterday.    MEDICATIONS  (STANDING):  albuterol/ipratropium for Nebulization 3 milliLiter(s) Nebulizer every 6 hours  allopurinol 100 milliGRAM(s) Oral daily  escitalopram 10 milliGRAM(s) Oral daily  heparin  Infusion 1800 Unit(s)/Hr (18 mL/Hr) IV Continuous <Continuous>  linaclotide 72 MICROGram(s) Oral before breakfast  metoprolol succinate ER 25 milliGRAM(s) Oral daily  mirabegron ER 25 milliGRAM(s) Oral daily  rosuvastatin 10 milliGRAM(s) Oral at bedtime  sodium chloride 3%  Inhalation 4 milliLiter(s) Inhalation every 6 hours  spironolactone 25 milliGRAM(s) Oral daily  torsemide 40 milliGRAM(s) Oral daily    MEDICATIONS  (PRN):  guaiFENesin Oral Liquid (Sugar-Free) 100 milliGRAM(s) Oral every 6 hours PRN Cough      I&O's Summary    06 Jun 2025 07:01  -  07 Jun 2025 07:00  --------------------------------------------------------  IN: 240 mL / OUT: 300 mL / NET: -60 mL        PHYSICAL EXAM:  Vital Signs Last 24 Hrs  T(C): 36.4 (07 Jun 2025 08:30), Max: 37.1 (06 Jun 2025 21:23)  T(F): 97.5 (07 Jun 2025 08:30), Max: 98.7 (06 Jun 2025 21:23)  HR: 80 (07 Jun 2025 08:30) (74 - 82)  BP: 114/76 (07 Jun 2025 08:30) (114/75 - 144/92)  BP(mean): --  RR: 18 (07 Jun 2025 08:30) (18 - 18)  SpO2: 92% (07 Jun 2025 08:30) (92% - 95%)    Parameters below as of 07 Jun 2025 08:30  Patient On (Oxygen Delivery Method): nasal cannula  O2 Flow (L/min): 3    CONSTITUTIONAL: NAD  EYES: EOMI; conjunctiva and sclera clear  ENMT: Moist oral mucosa  RESPIRATORY: Mild inc WOB, scattered rhonchi, no wheezing  CARDIOVASCULAR: RRR, no murmurs, no LE edema  ABDOMEN: Nontender to palpation, mild distention, no rebound/guarding  PSYCH: A+O x3  NEUROLOGY: mild R facial droop    LABS:                        14.2   7.08  )-----------( 265      ( 07 Jun 2025 07:16 )             41.8     06-07    135  |  93[L]  |  33[H]  ----------------------------<  116[H]  3.7   |  27  |  1.30    Ca    9.6      07 Jun 2025 07:17      PT/INR - ( 07 Jun 2025 07:18 )   PT: 11.8 sec;   INR: 1.04 ratio         PTT - ( 07 Jun 2025 07:18 )  PTT:26.7 sec      Urinalysis Basic - ( 07 Jun 2025 07:17 )    Color: x / Appearance: x / SG: x / pH: x  Gluc: 116 mg/dL / Ketone: x  / Bili: x / Urobili: x   Blood: x / Protein: x / Nitrite: x   Leuk Esterase: x / RBC: x / WBC x   Sq Epi: x / Non Sq Epi: x / Bacteria: x            RADIOLOGY & ADDITIONAL TESTS:  New Imaging Personally Reviewed Today:  New Electrocardiogram Personally Reviewed Today:  Other Results Reviewed Today:   Prior or Outpatient Records Reviewed Today with Summary:    COORDINATION OF CARE:  Consultant Communication and Details of Discussion (where applicable):

## 2025-06-07 NOTE — CHART NOTE - NSCHARTNOTEFT_GEN_A_CORE
Long interval CTH reviewed with stable findings.    While there is no absolute neurosurgical contraindication to systemic anti coagulation, there does exist an increased risk of intracranial hemorrhage which can result in significant morbidity including but not limited to headache, seizure, stroke, paralysis, and in severe cases even death.    The risks and benefits of starting systemic anticoagulation must be considered carefully in the setting of the patients medical history and current clinical condition.    - Recommend NO bolus with low ptt goal of ~60-65  - Recommend obtaining follow up CTH after patient is therapeutic and to notify neurosurgery immediately with any changes in the patients neurologic exam Long interval CTH reviewed with stable findings.    - from neurosurgical perspective, would recommend IVC filter if LE dvt is source of PE    - for life-saving measures, there is no absolute neurosurgical contraindication to AC use but there is heightened risk    While there is no absolute neurosurgical contraindication to systemic anti coagulation, there does exist an increased risk of intracranial hemorrhage which can result in significant morbidity including but not limited to headache, seizure, stroke, paralysis, and in severe cases even death.    The risks and benefits of starting systemic anticoagulation must be considered carefully in the setting of the patients medical history and current clinical condition.    - Recommend NO bolus with low ptt goal of ~60-65  - Recommend obtaining follow up CTH after patient is therapeutic and to notify neurosurgery immediately with any changes in the patients neurologic exam

## 2025-06-08 LAB
ANION GAP SERPL CALC-SCNC: 16 MMOL/L — SIGNIFICANT CHANGE UP (ref 5–17)
APTT BLD: 33.5 SEC — SIGNIFICANT CHANGE UP (ref 26.1–36.8)
APTT BLD: 55.8 SEC — HIGH (ref 26.1–36.8)
APTT BLD: 95.6 SEC — HIGH (ref 26.1–36.8)
BUN SERPL-MCNC: 32 MG/DL — HIGH (ref 7–23)
CALCIUM SERPL-MCNC: 9.5 MG/DL — SIGNIFICANT CHANGE UP (ref 8.4–10.5)
CHLORIDE SERPL-SCNC: 93 MMOL/L — LOW (ref 96–108)
CO2 SERPL-SCNC: 26 MMOL/L — SIGNIFICANT CHANGE UP (ref 22–31)
CREAT SERPL-MCNC: 1.22 MG/DL — SIGNIFICANT CHANGE UP (ref 0.5–1.3)
EGFR: 59 ML/MIN/1.73M2 — LOW
EGFR: 59 ML/MIN/1.73M2 — LOW
GLUCOSE SERPL-MCNC: 127 MG/DL — HIGH (ref 70–99)
HCT VFR BLD CALC: 41.6 % — SIGNIFICANT CHANGE UP (ref 39–50)
HCT VFR BLD CALC: 42.1 % — SIGNIFICANT CHANGE UP (ref 39–50)
HGB BLD-MCNC: 14.6 G/DL — SIGNIFICANT CHANGE UP (ref 13–17)
HGB BLD-MCNC: 14.7 G/DL — SIGNIFICANT CHANGE UP (ref 13–17)
INR BLD: 1.08 RATIO — SIGNIFICANT CHANGE UP (ref 0.85–1.16)
MCHC RBC-ENTMCNC: 32.6 PG — SIGNIFICANT CHANGE UP (ref 27–34)
MCHC RBC-ENTMCNC: 32.7 PG — SIGNIFICANT CHANGE UP (ref 27–34)
MCHC RBC-ENTMCNC: 34.7 G/DL — SIGNIFICANT CHANGE UP (ref 32–36)
MCHC RBC-ENTMCNC: 35.3 G/DL — SIGNIFICANT CHANGE UP (ref 32–36)
MCV RBC AUTO: 92.7 FL — SIGNIFICANT CHANGE UP (ref 80–100)
MCV RBC AUTO: 94 FL — SIGNIFICANT CHANGE UP (ref 80–100)
NRBC BLD AUTO-RTO: 0 /100 WBCS — SIGNIFICANT CHANGE UP (ref 0–0)
NRBC BLD AUTO-RTO: 0 /100 WBCS — SIGNIFICANT CHANGE UP (ref 0–0)
PLATELET # BLD AUTO: 237 K/UL — SIGNIFICANT CHANGE UP (ref 150–400)
PLATELET # BLD AUTO: 276 K/UL — SIGNIFICANT CHANGE UP (ref 150–400)
POTASSIUM SERPL-MCNC: 3.7 MMOL/L — SIGNIFICANT CHANGE UP (ref 3.5–5.3)
POTASSIUM SERPL-SCNC: 3.7 MMOL/L — SIGNIFICANT CHANGE UP (ref 3.5–5.3)
PROTHROM AB SERPL-ACNC: 12.3 SEC — SIGNIFICANT CHANGE UP (ref 9.9–13.4)
RBC # BLD: 4.48 M/UL — SIGNIFICANT CHANGE UP (ref 4.2–5.8)
RBC # BLD: 4.49 M/UL — SIGNIFICANT CHANGE UP (ref 4.2–5.8)
RBC # FLD: 12.5 % — SIGNIFICANT CHANGE UP (ref 10.3–14.5)
RBC # FLD: 12.6 % — SIGNIFICANT CHANGE UP (ref 10.3–14.5)
SODIUM SERPL-SCNC: 135 MMOL/L — SIGNIFICANT CHANGE UP (ref 135–145)
WBC # BLD: 7.89 K/UL — SIGNIFICANT CHANGE UP (ref 3.8–10.5)
WBC # BLD: 8.09 K/UL — SIGNIFICANT CHANGE UP (ref 3.8–10.5)
WBC # FLD AUTO: 7.89 K/UL — SIGNIFICANT CHANGE UP (ref 3.8–10.5)
WBC # FLD AUTO: 8.09 K/UL — SIGNIFICANT CHANGE UP (ref 3.8–10.5)

## 2025-06-08 PROCEDURE — 99233 SBSQ HOSP IP/OBS HIGH 50: CPT

## 2025-06-08 RX ADMIN — Medication 4 MILLILITER(S): at 16:45

## 2025-06-08 RX ADMIN — HEPARIN SODIUM 14.5 UNIT(S)/HR: 1000 INJECTION INTRAVENOUS; SUBCUTANEOUS at 08:45

## 2025-06-08 RX ADMIN — Medication 4 MILLILITER(S): at 12:30

## 2025-06-08 RX ADMIN — HEPARIN SODIUM 14.5 UNIT(S)/HR: 1000 INJECTION INTRAVENOUS; SUBCUTANEOUS at 16:42

## 2025-06-08 RX ADMIN — Medication 4 MILLILITER(S): at 05:59

## 2025-06-08 RX ADMIN — Medication 100 MILLIGRAM(S): at 12:29

## 2025-06-08 RX ADMIN — Medication 25 MILLIGRAM(S): at 05:59

## 2025-06-08 RX ADMIN — ESCITALOPRAM OXALATE 10 MILLIGRAM(S): 20 TABLET ORAL at 12:30

## 2025-06-08 RX ADMIN — IPRATROPIUM BROMIDE AND ALBUTEROL SULFATE 3 MILLILITER(S): .5; 2.5 SOLUTION RESPIRATORY (INHALATION) at 16:46

## 2025-06-08 RX ADMIN — ROSUVASTATIN CALCIUM 10 MILLIGRAM(S): 20 TABLET, FILM COATED ORAL at 21:48

## 2025-06-08 RX ADMIN — Medication 4 MILLILITER(S): at 23:54

## 2025-06-08 RX ADMIN — IPRATROPIUM BROMIDE AND ALBUTEROL SULFATE 3 MILLILITER(S): .5; 2.5 SOLUTION RESPIRATORY (INHALATION) at 23:54

## 2025-06-08 RX ADMIN — Medication 40 MILLIGRAM(S): at 05:59

## 2025-06-08 RX ADMIN — DEXTROMETHORPHAN HBR, GUAIFENESIN 100 MILLIGRAM(S): 200 LIQUID ORAL at 19:47

## 2025-06-08 RX ADMIN — HEPARIN SODIUM 15 UNIT(S)/HR: 1000 INJECTION INTRAVENOUS; SUBCUTANEOUS at 09:14

## 2025-06-08 RX ADMIN — LINACLOTIDE 72 MICROGRAM(S): 290 CAPSULE, GELATIN COATED ORAL at 06:01

## 2025-06-08 RX ADMIN — IPRATROPIUM BROMIDE AND ALBUTEROL SULFATE 3 MILLILITER(S): .5; 2.5 SOLUTION RESPIRATORY (INHALATION) at 12:30

## 2025-06-08 RX ADMIN — METOPROLOL SUCCINATE 25 MILLIGRAM(S): 50 TABLET, EXTENDED RELEASE ORAL at 05:59

## 2025-06-08 RX ADMIN — IPRATROPIUM BROMIDE AND ALBUTEROL SULFATE 3 MILLILITER(S): .5; 2.5 SOLUTION RESPIRATORY (INHALATION) at 05:59

## 2025-06-08 RX ADMIN — MIRABEGRON 25 MILLIGRAM(S): 50 TABLET, FILM COATED, EXTENDED RELEASE ORAL at 12:29

## 2025-06-08 NOTE — PROGRESS NOTE ADULT - PROBLEM SELECTOR PLAN 6
f/u with [facility] outpatient. Unclear if reduced or preserved EF. Has struggled with chronic LE edema, unclear how much is 2/2 to HF vs Chronic venous insufficieny (per outpatient notes appears more likely the later)   -on torsemide 40mg outpatient, resumed  -continue spironolactone 25mg daily  -continue metop 25mg daily  -previously on ace, off currently  -TTE TTE 7/2024: moderately reduced EF, G1DD. Has struggled with chronic LE edema, unclear how much is 2/2 to HF vs Chronic venous insufficieny (per outpatient notes appears more likely the later)   -on torsemide 40mg outpatient, resumed  -continue spironolactone 25mg daily  -continue metop 25mg daily  -previously on ace, off currently  -TTE

## 2025-06-08 NOTE — PROGRESS NOTE ADULT - SUBJECTIVE AND OBJECTIVE BOX
[facility] Division of Hospital Medicine  Marizol Acosta MD  Available via MS Teams    SUBJECTIVE / OVERNIGHT EVENTS:  More hypoxic overnight, back on 5L NC. Cough worse today. Using vibrapep at bedside. IS brought to bedside this AM.    MEDICATIONS  (STANDING):  albuterol/ipratropium for Nebulization 3 milliLiter(s) Nebulizer every 6 hours  allopurinol 100 milliGRAM(s) Oral daily  escitalopram 10 milliGRAM(s) Oral daily  heparin  Infusion 1800 Unit(s)/Hr (15 mL/Hr) IV Continuous <Continuous>  linaclotide 72 MICROGram(s) Oral before breakfast  metoprolol succinate ER 25 milliGRAM(s) Oral daily  mirabegron ER 25 milliGRAM(s) Oral daily  rosuvastatin 10 milliGRAM(s) Oral at bedtime  sodium chloride 3%  Inhalation 4 milliLiter(s) Inhalation every 6 hours  spironolactone 25 milliGRAM(s) Oral daily  torsemide 40 milliGRAM(s) Oral daily    MEDICATIONS  (PRN):  guaiFENesin Oral Liquid (Sugar-Free) 100 milliGRAM(s) Oral every 6 hours PRN Cough      I&O's Summary    07 Jun 2025 07:01  -  08 Jun 2025 07:00  --------------------------------------------------------  IN: 0 mL / OUT: 1300 mL / NET: -1300 mL    08 Jun 2025 07:01  -  08 Jun 2025 14:57  --------------------------------------------------------  IN: 240 mL / OUT: 600 mL / NET: -360 mL        PHYSICAL EXAM:  Vital Signs Last 24 Hrs  T(C): 36.9 (08 Jun 2025 12:31), Max: 36.9 (07 Jun 2025 21:07)  T(F): 98.4 (08 Jun 2025 12:31), Max: 98.4 (07 Jun 2025 21:07)  HR: 84 (08 Jun 2025 12:31) (79 - 87)  BP: 123/81 (08 Jun 2025 12:31) (108/70 - 123/81)  BP(mean): --  RR: 18 (08 Jun 2025 12:31) (18 - 18)  SpO2: 90% (08 Jun 2025 12:31) (90% - 93%)    Parameters below as of 08 Jun 2025 12:31  Patient On (Oxygen Delivery Method): nasal cannula  O2 Flow (L/min): 4    CONSTITUTIONAL: NAD  EYES: EOMI; conjunctiva and sclera clear  ENMT: Moist oral mucosa  RESPIRATORY: Mild inc WOB, scattered rhonchi, no wheezing  CARDIOVASCULAR: RRR, no murmurs, no LE edema  ABDOMEN: Nontender to palpation, mild distention, no rebound/guarding  PSYCH: A+O x3  NEUROLOGY: mild R facial droop    LABS:                        14.6   8.09  )-----------( 276      ( 08 Jun 2025 08:32 )             42.1     06-08    135  |  93[L]  |  32[H]  ----------------------------<  127[H]  3.7   |  26  |  1.22    Ca    9.5      08 Jun 2025 08:32      PT/INR - ( 08 Jun 2025 08:32 )   PT: 12.3 sec;   INR: 1.08 ratio         PTT - ( 08 Jun 2025 08:32 )  PTT:33.5 sec      Urinalysis Basic - ( 08 Jun 2025 08:32 )    Color: x / Appearance: x / SG: x / pH: x  Gluc: 127 mg/dL / Ketone: x  / Bili: x / Urobili: x   Blood: x / Protein: x / Nitrite: x   Leuk Esterase: x / RBC: x / WBC x   Sq Epi: x / Non Sq Epi: x / Bacteria: x            RADIOLOGY & ADDITIONAL TESTS:  New Imaging Personally Reviewed Today:  New Electrocardiogram Personally Reviewed Today:  Other Results Reviewed Today:   Prior or Outpatient Records Reviewed Today with Summary:    COORDINATION OF CARE:  Consultant Communication and Details of Discussion (where applicable):

## 2025-06-08 NOTE — CHART NOTE - NSCHARTNOTEFT_GEN_A_CORE
Medicine ACP chart note    CC:     85 y/o male hx HTN, HLD, CAD w/ stents (10 years ago), CHF, admitted for falls, hypoxia, PE. S/P CODE STROKE,  concerning for new R facial droop. CTH done showing small left frontal parafalcine subdural hemorrhage. Rt facial droop chronic per neuro note  On heparin gtt, pt specific  Notified by Hospitalist that CTH ordered in setting of ptt 72   Expedited CTH, revealed no acute changes    < from: CT Head No Cont (06.07.25 @ 22:33) >    IMPRESSION:    1.  No significant change, without interval rebleeding.        < end of copied text >    Close monitoring while on Heparin gtt  PTT goal 50-65    Patient requires more supplemental o2 from 2l to 5 l to keep o2sat > 92%  AHRF: CXR RLL consolidation, s/p CTX + azithro  -Chest congestion, productive cough, no wheeze  -S/P Abx  - C/W Duoneb  Chest physio  Monitor o2 s\at    Will sign out to day team

## 2025-06-09 DIAGNOSIS — I51.3 INTRACARDIAC THROMBOSIS, NOT ELSEWHERE CLASSIFIED: ICD-10-CM

## 2025-06-09 LAB
ANION GAP SERPL CALC-SCNC: 18 MMOL/L — HIGH (ref 5–17)
APTT BLD: 32.7 SEC — SIGNIFICANT CHANGE UP (ref 26.1–36.8)
APTT BLD: 69.6 SEC — HIGH (ref 26.1–36.8)
APTT BLD: 85.5 SEC — HIGH (ref 26.1–36.8)
APTT BLD: 92.4 SEC — HIGH (ref 26.1–36.8)
BUN SERPL-MCNC: 30 MG/DL — HIGH (ref 7–23)
CALCIUM SERPL-MCNC: 9.7 MG/DL — SIGNIFICANT CHANGE UP (ref 8.4–10.5)
CHLORIDE SERPL-SCNC: 93 MMOL/L — LOW (ref 96–108)
CO2 SERPL-SCNC: 24 MMOL/L — SIGNIFICANT CHANGE UP (ref 22–31)
CREAT SERPL-MCNC: 1.22 MG/DL — SIGNIFICANT CHANGE UP (ref 0.5–1.3)
EGFR: 59 ML/MIN/1.73M2 — LOW
EGFR: 59 ML/MIN/1.73M2 — LOW
GLUCOSE SERPL-MCNC: 120 MG/DL — HIGH (ref 70–99)
HCT VFR BLD CALC: 41.9 % — SIGNIFICANT CHANGE UP (ref 39–50)
HCT VFR BLD CALC: 42.1 % — SIGNIFICANT CHANGE UP (ref 39–50)
HCT VFR BLD CALC: 42.6 % — SIGNIFICANT CHANGE UP (ref 39–50)
HCT VFR BLD CALC: 43.1 % — SIGNIFICANT CHANGE UP (ref 39–50)
HGB BLD-MCNC: 14.6 G/DL — SIGNIFICANT CHANGE UP (ref 13–17)
HGB BLD-MCNC: 14.7 G/DL — SIGNIFICANT CHANGE UP (ref 13–17)
HGB BLD-MCNC: 14.8 G/DL — SIGNIFICANT CHANGE UP (ref 13–17)
HGB BLD-MCNC: 15 G/DL — SIGNIFICANT CHANGE UP (ref 13–17)
INR BLD: 1.07 RATIO — SIGNIFICANT CHANGE UP (ref 0.85–1.16)
MCHC RBC-ENTMCNC: 32.1 PG — SIGNIFICANT CHANGE UP (ref 27–34)
MCHC RBC-ENTMCNC: 32.4 PG — SIGNIFICANT CHANGE UP (ref 27–34)
MCHC RBC-ENTMCNC: 32.7 PG — SIGNIFICANT CHANGE UP (ref 27–34)
MCHC RBC-ENTMCNC: 32.9 PG — SIGNIFICANT CHANGE UP (ref 27–34)
MCHC RBC-ENTMCNC: 34.3 G/DL — SIGNIFICANT CHANGE UP (ref 32–36)
MCHC RBC-ENTMCNC: 34.7 G/DL — SIGNIFICANT CHANGE UP (ref 32–36)
MCHC RBC-ENTMCNC: 35.1 G/DL — SIGNIFICANT CHANGE UP (ref 32–36)
MCHC RBC-ENTMCNC: 35.2 G/DL — SIGNIFICANT CHANGE UP (ref 32–36)
MCV RBC AUTO: 93.3 FL — SIGNIFICANT CHANGE UP (ref 80–100)
MCV RBC AUTO: 93.3 FL — SIGNIFICANT CHANGE UP (ref 80–100)
MCV RBC AUTO: 93.4 FL — SIGNIFICANT CHANGE UP (ref 80–100)
MCV RBC AUTO: 93.5 FL — SIGNIFICANT CHANGE UP (ref 80–100)
NRBC BLD AUTO-RTO: 0 /100 WBCS — SIGNIFICANT CHANGE UP (ref 0–0)
PLATELET # BLD AUTO: 239 K/UL — SIGNIFICANT CHANGE UP (ref 150–400)
PLATELET # BLD AUTO: 269 K/UL — SIGNIFICANT CHANGE UP (ref 150–400)
PLATELET # BLD AUTO: 272 K/UL — SIGNIFICANT CHANGE UP (ref 150–400)
PLATELET # BLD AUTO: 278 K/UL — SIGNIFICANT CHANGE UP (ref 150–400)
POTASSIUM SERPL-MCNC: 4.4 MMOL/L — SIGNIFICANT CHANGE UP (ref 3.5–5.3)
POTASSIUM SERPL-SCNC: 4.4 MMOL/L — SIGNIFICANT CHANGE UP (ref 3.5–5.3)
PROTHROM AB SERPL-ACNC: 12.2 SEC — SIGNIFICANT CHANGE UP (ref 9.9–13.4)
RBC # BLD: 4.49 M/UL — SIGNIFICANT CHANGE UP (ref 4.2–5.8)
RBC # BLD: 4.51 M/UL — SIGNIFICANT CHANGE UP (ref 4.2–5.8)
RBC # BLD: 4.56 M/UL — SIGNIFICANT CHANGE UP (ref 4.2–5.8)
RBC # BLD: 4.61 M/UL — SIGNIFICANT CHANGE UP (ref 4.2–5.8)
RBC # FLD: 12.4 % — SIGNIFICANT CHANGE UP (ref 10.3–14.5)
RBC # FLD: 12.6 % — SIGNIFICANT CHANGE UP (ref 10.3–14.5)
RBC # FLD: 12.6 % — SIGNIFICANT CHANGE UP (ref 10.3–14.5)
RBC # FLD: 12.7 % — SIGNIFICANT CHANGE UP (ref 10.3–14.5)
SODIUM SERPL-SCNC: 135 MMOL/L — SIGNIFICANT CHANGE UP (ref 135–145)
WBC # BLD: 8.6 K/UL — SIGNIFICANT CHANGE UP (ref 3.8–10.5)
WBC # BLD: 8.97 K/UL — SIGNIFICANT CHANGE UP (ref 3.8–10.5)
WBC # BLD: 9.14 K/UL — SIGNIFICANT CHANGE UP (ref 3.8–10.5)
WBC # BLD: 9.28 K/UL — SIGNIFICANT CHANGE UP (ref 3.8–10.5)
WBC # FLD AUTO: 8.6 K/UL — SIGNIFICANT CHANGE UP (ref 3.8–10.5)
WBC # FLD AUTO: 8.97 K/UL — SIGNIFICANT CHANGE UP (ref 3.8–10.5)
WBC # FLD AUTO: 9.14 K/UL — SIGNIFICANT CHANGE UP (ref 3.8–10.5)
WBC # FLD AUTO: 9.28 K/UL — SIGNIFICANT CHANGE UP (ref 3.8–10.5)

## 2025-06-09 PROCEDURE — 99233 SBSQ HOSP IP/OBS HIGH 50: CPT

## 2025-06-09 PROCEDURE — 70450 CT HEAD/BRAIN W/O DYE: CPT | Mod: 26

## 2025-06-09 RX ORDER — HEPARIN SODIUM 1000 [USP'U]/ML
1350 INJECTION INTRAVENOUS; SUBCUTANEOUS
Qty: 25000 | Refills: 0 | Status: DISCONTINUED | OUTPATIENT
Start: 2025-06-09 | End: 2025-06-09

## 2025-06-09 RX ORDER — HEPARIN SODIUM 1000 [USP'U]/ML
1150 INJECTION INTRAVENOUS; SUBCUTANEOUS
Qty: 25000 | Refills: 0 | Status: DISCONTINUED | OUTPATIENT
Start: 2025-06-09 | End: 2025-06-10

## 2025-06-09 RX ADMIN — HEPARIN SODIUM 14 UNIT(S)/HR: 1000 INJECTION INTRAVENOUS; SUBCUTANEOUS at 07:01

## 2025-06-09 RX ADMIN — IPRATROPIUM BROMIDE AND ALBUTEROL SULFATE 3 MILLILITER(S): .5; 2.5 SOLUTION RESPIRATORY (INHALATION) at 17:32

## 2025-06-09 RX ADMIN — Medication 1000 MILLIGRAM(S): at 03:37

## 2025-06-09 RX ADMIN — ESCITALOPRAM OXALATE 10 MILLIGRAM(S): 20 TABLET ORAL at 11:35

## 2025-06-09 RX ADMIN — MIRABEGRON 25 MILLIGRAM(S): 50 TABLET, FILM COATED, EXTENDED RELEASE ORAL at 11:35

## 2025-06-09 RX ADMIN — IPRATROPIUM BROMIDE AND ALBUTEROL SULFATE 3 MILLILITER(S): .5; 2.5 SOLUTION RESPIRATORY (INHALATION) at 23:06

## 2025-06-09 RX ADMIN — HEPARIN SODIUM 12 UNIT(S)/HR: 1000 INJECTION INTRAVENOUS; SUBCUTANEOUS at 14:44

## 2025-06-09 RX ADMIN — LINACLOTIDE 72 MICROGRAM(S): 290 CAPSULE, GELATIN COATED ORAL at 05:15

## 2025-06-09 RX ADMIN — Medication 100 MILLIGRAM(S): at 11:36

## 2025-06-09 RX ADMIN — Medication 4 MILLILITER(S): at 05:14

## 2025-06-09 RX ADMIN — Medication 4 MILLILITER(S): at 11:36

## 2025-06-09 RX ADMIN — Medication 25 MILLIGRAM(S): at 05:15

## 2025-06-09 RX ADMIN — Medication 4 MILLILITER(S): at 17:32

## 2025-06-09 RX ADMIN — HEPARIN SODIUM 11.5 UNIT(S)/HR: 1000 INJECTION INTRAVENOUS; SUBCUTANEOUS at 23:19

## 2025-06-09 RX ADMIN — Medication 40 MILLIGRAM(S): at 07:08

## 2025-06-09 RX ADMIN — HEPARIN SODIUM 13.5 UNIT(S)/HR: 1000 INJECTION INTRAVENOUS; SUBCUTANEOUS at 00:31

## 2025-06-09 RX ADMIN — ROSUVASTATIN CALCIUM 10 MILLIGRAM(S): 20 TABLET, FILM COATED ORAL at 22:52

## 2025-06-09 RX ADMIN — IPRATROPIUM BROMIDE AND ALBUTEROL SULFATE 3 MILLILITER(S): .5; 2.5 SOLUTION RESPIRATORY (INHALATION) at 05:14

## 2025-06-09 RX ADMIN — Medication 4 MILLILITER(S): at 23:06

## 2025-06-09 RX ADMIN — METOPROLOL SUCCINATE 25 MILLIGRAM(S): 50 TABLET, EXTENDED RELEASE ORAL at 07:08

## 2025-06-09 RX ADMIN — IPRATROPIUM BROMIDE AND ALBUTEROL SULFATE 3 MILLILITER(S): .5; 2.5 SOLUTION RESPIRATORY (INHALATION) at 11:36

## 2025-06-09 NOTE — PROGRESS NOTE ADULT - SUBJECTIVE AND OBJECTIVE BOX
Sharmila Ruffin DO  Division of Hospital Medicine  Available on MS Teams    SUBJECTIVE / OVERNIGHT EVENTS:  Continues to complain of cough, SOB improving and weaned down to 2L NC.    MEDICATIONS  (STANDING):  albuterol/ipratropium for Nebulization 3 milliLiter(s) Nebulizer every 6 hours  allopurinol 100 milliGRAM(s) Oral daily  escitalopram 10 milliGRAM(s) Oral daily  heparin  Infusion 1350 Unit(s)/Hr (14 mL/Hr) IV Continuous <Continuous>  linaclotide 72 MICROGram(s) Oral before breakfast  metoprolol succinate ER 25 milliGRAM(s) Oral daily  mirabegron ER 25 milliGRAM(s) Oral daily  rosuvastatin 10 milliGRAM(s) Oral at bedtime  sodium chloride 3%  Inhalation 4 milliLiter(s) Inhalation every 6 hours  spironolactone 25 milliGRAM(s) Oral daily  torsemide 40 milliGRAM(s) Oral daily    MEDICATIONS  (PRN):  guaiFENesin Oral Liquid (Sugar-Free) 100 milliGRAM(s) Oral every 6 hours PRN Cough  Cough      I&O's Summary    08 Jun 2025 07:01  -  09 Jun 2025 07:00  --------------------------------------------------------  IN: 240 mL / OUT: 600 mL / NET: -360 mL          PHYSICAL EXAM:  Vital Signs Last 24 Hrs  T(C): 36.8 (09 Jun 2025 11:09), Max: 37 (09 Jun 2025 05:10)  T(F): 98.2 (09 Jun 2025 11:09), Max: 98.6 (09 Jun 2025 05:10)  HR: 62 (09 Jun 2025 11:09) (62 - 93)  BP: 161/66 (09 Jun 2025 11:09) (100/68 - 161/66)  BP(mean): --  RR: 18 (09 Jun 2025 11:09) (18 - 18)  SpO2: 96% (09 Jun 2025 11:09) (91% - 96%)    Parameters below as of 09 Jun 2025 11:09  Patient On (Oxygen Delivery Method): nasal cannula  O2 Flow (L/min): 2      CONSTITUTIONAL: NAD  EYES: EOMI; conjunctiva and sclera clear  ENMT: Moist oral mucosa  RESPIRATORY: Mild inc WOB, scattered rhonchi, no wheezing  CARDIOVASCULAR: RRR, no murmurs, no LE edema  ABDOMEN: Nontender to palpation, mild distention, no rebound/guarding  PSYCH: A+O x3  NEUROLOGY: mild R facial droop    LABS:                         14.6   9.14  )-----------( 269      ( 09 Jun 2025 05:46 )             42.1     06-09    135  |  93[L]  |  30[H]  ----------------------------<  120[H]  4.4   |  24  |  1.22    Ca    9.7      09 Jun 2025 05:46      PT/INR - ( 09 Jun 2025 05:46 )   PT: 12.2 sec;   INR: 1.07 ratio         PTT - ( 09 Jun 2025 05:46 )  PTT:32.7 sec  Urinalysis Basic - ( 09 Jun 2025 05:46 )    Color: x / Appearance: x / SG: x / pH: x  Gluc: 120 mg/dL / Ketone: x  / Bili: x / Urobili: x   Blood: x / Protein: x / Nitrite: x   Leuk Esterase: x / RBC: x / WBC x   Sq Epi: x / Non Sq Epi: x / Bacteria: x        Labs and imaging reviewed

## 2025-06-09 NOTE — PROGRESS NOTE ADULT - SUBJECTIVE AND OBJECTIVE BOX
CHIEF COMPLAINT: Falls, SOB    Interval Events: Feels breathing is improved since admission but still with SOB    REVIEW OF SYSTEMS:  Constitutional: [ ] negative [ ] fevers [ ] chills [ ] weight loss [ ] weight gain  HEENT: [ ] negative [ ] dry eyes [ ] eye irritation [ ] postnasal drip [ ] nasal congestion  CV: [ ] negative  [ ] chest pain [ ] orthopnea [ ] palpitations [ ] murmur  Resp: [ ] negative [ ] cough [X] shortness of breath [X] dyspnea [ ] wheezing [ ] sputum [ ] hemoptysis  GI: [ ] negative [ ] nausea [ ] vomiting [ ] diarrhea [ ] constipation [ ] abd pain [ ] dysphagia   : [ ] negative [ ] dysuria [ ] nocturia [ ] hematuria [ ] increased urinary frequency  Musculoskeletal: [ ] negative [ ] back pain [ ] myalgias [ ] arthralgias [ ] fracture  Skin: [ ] negative [ ] rash [ ] itch  Neurological: [ ] negative [ ] headache [ ] dizziness [ ] syncope [ ] weakness [ ] numbness  Psychiatric: [ ] negative [ ] anxiety [ ] depression  Endocrine: [ ] negative [ ] diabetes [ ] thyroid problem  Hematologic/Lymphatic: [ ] negative [ ] anemia [ ] bleeding problem  Allergic/Immunologic: [ ] negative [ ] itchy eyes [ ] nasal discharge [ ] hives [ ] angioedema  [X] All other systems negative  [ ] Unable to assess ROS because ________    OBJECTIVE:  ICU Vital Signs Last 24 Hrs  T(C): 36.8 (09 Jun 2025 11:09), Max: 37 (09 Jun 2025 05:10)  T(F): 98.2 (09 Jun 2025 11:09), Max: 98.6 (09 Jun 2025 05:10)  HR: 62 (09 Jun 2025 11:09) (62 - 93)  BP: 161/66 (09 Jun 2025 11:09) (100/68 - 161/66)  BP(mean): --  ABP: --  ABP(mean): --  RR: 18 (09 Jun 2025 11:09) (18 - 18)  SpO2: 96% (09 Jun 2025 11:09) (91% - 96%)    O2 Parameters below as of 09 Jun 2025 11:09  Patient On (Oxygen Delivery Method): nasal cannula  O2 Flow (L/min): 2            06-08 @ 07:01  -  06-09 @ 07:00  --------------------------------------------------------  IN: 240 mL / OUT: 600 mL / NET: -360 mL      CAPILLARY BLOOD GLUCOSE          PHYSICAL EXAM:  General: NAD, resting comfortably  HEENT: EOMI, PERRLA  Neck: Supple  Respiratory: CTAB  Cardiovascular: S1S2, RRR  Abdomen: Soft, NTND, BS+  Extremities: No edema  Skin: Warm and dry  Neurological: No gross focal deficits  Psychiatry: Depressed affect    HOSPITAL MEDICATIONS:  MEDICATIONS  (STANDING):  albuterol/ipratropium for Nebulization 3 milliLiter(s) Nebulizer every 6 hours  allopurinol 100 milliGRAM(s) Oral daily  escitalopram 10 milliGRAM(s) Oral daily  heparin  Infusion 1350 Unit(s)/Hr (14 mL/Hr) IV Continuous <Continuous>  linaclotide 72 MICROGram(s) Oral before breakfast  metoprolol succinate ER 25 milliGRAM(s) Oral daily  mirabegron ER 25 milliGRAM(s) Oral daily  rosuvastatin 10 milliGRAM(s) Oral at bedtime  sodium chloride 3%  Inhalation 4 milliLiter(s) Inhalation every 6 hours  spironolactone 25 milliGRAM(s) Oral daily  torsemide 40 milliGRAM(s) Oral daily    MEDICATIONS  (PRN):  guaiFENesin Oral Liquid (Sugar-Free) 100 milliGRAM(s) Oral every 6 hours PRN Cough      LABS:                        14.6   9.14  )-----------( 269      ( 09 Jun 2025 05:46 )             42.1     Hgb Trend: 14.6<--, 14.8<--, 14.7<--, 14.6<--, 14.6<--  06-09    135  |  93[L]  |  30[H]  ----------------------------<  120[H]  4.4   |  24  |  1.22    Ca    9.7      09 Jun 2025 05:46      Creatinine Trend: 1.22<--, 1.22<--, 1.30<--, 1.21<--, 1.22<--, 1.16<--  PT/INR - ( 09 Jun 2025 05:46 )   PT: 12.2 sec;   INR: 1.07 ratio         PTT - ( 09 Jun 2025 05:46 )  PTT:32.7 sec  Urinalysis Basic - ( 09 Jun 2025 05:46 )    Color: x / Appearance: x / SG: x / pH: x  Gluc: 120 mg/dL / Ketone: x  / Bili: x / Urobili: x   Blood: x / Protein: x / Nitrite: x   Leuk Esterase: x / RBC: x / WBC x   Sq Epi: x / Non Sq Epi: x / Bacteria: x            MICROBIOLOGY:       RADIOLOGY:  [ ] Reviewed and interpreted by me    PULMONARY FUNCTION TESTS:    EKG:

## 2025-06-09 NOTE — PROGRESS NOTE ADULT - SUBJECTIVE AND OBJECTIVE BOX
Vascular Cardiology Consult Note     DIRECT PROVIDER NUMBER: [phone]  / Available on TEAMS    CC:  weakness and DVT    Interval Events:  Patient resumed on Heparin gtt without bolus on 6/7.  Reports breathing improved.  Reduced cough.   CT head stable on 6/7 per radiology read.     MEDICATIONS  (STANDING):  albuterol/ipratropium for Nebulization 3 milliLiter(s) Nebulizer every 6 hours  allopurinol 100 milliGRAM(s) Oral daily  escitalopram 10 milliGRAM(s) Oral daily  heparin  Infusion 1350 Unit(s)/Hr (14 mL/Hr) IV Continuous <Continuous>  linaclotide 72 MICROGram(s) Oral before breakfast  metoprolol succinate ER 25 milliGRAM(s) Oral daily  mirabegron ER 25 milliGRAM(s) Oral daily  rosuvastatin 10 milliGRAM(s) Oral at bedtime  sodium chloride 3%  Inhalation 4 milliLiter(s) Inhalation every 6 hours  spironolactone 25 milliGRAM(s) Oral daily  torsemide 40 milliGRAM(s) Oral daily    PAST MEDICAL & SURGICAL HISTORY:  Chronic diastolic congestive heart failure  CAD (coronary artery disease)  No significant past surgical history    FAMILY HISTORY:  No pertinent family history in first degree relatives    SOCIAL HISTORY:  unchanged    REVIEW OF SYSTEMS:  CONSTITUTIONAL: No fever  EYES: No eye pain  ENT:  No throat pain  NECK: No pain   RESPIRATORY: SOB on admission    CARDIOVASCULAR:  No C/P  GASTROINTESTINAL: No abdominal pain  GENITOURINARY: No hematuria  SKIN: No LE wounds  LYMPH Nodes: No enlarged glands noted  ENDOCRINE: No heat or cold intolerance noted  MUSCULOSKELETAL:  LE edema  PSYCHIATRIC: No depression, anxiety  HEME/LYMPH: No bleeding gums  ALLERGY AND IMMUNOLOGIC: No hives    [ x] All others negative	    Vital Signs Last 24 Hrs  T(C): 36.9 (09 Jun 2025 07:05), Max: 37 (09 Jun 2025 05:10)  T(F): 98.4 (09 Jun 2025 07:05), Max: 98.6 (09 Jun 2025 05:10)  HR: 87 (09 Jun 2025 07:05) (76 - 93)  BP: 150/96 (09 Jun 2025 07:05) (100/68 - 150/96)  BP(mean): --  RR: 18 (09 Jun 2025 07:05) (18 - 18)  SpO2: 94% (09 Jun 2025 07:05) (90% - 95%)    Parameters below as of 09 Jun 2025 07:05  Patient On (Oxygen Delivery Method): nasal cannula  O2 Flow (L/min): 4    Appearance: NAD  Cardiovascular: RRR, S1 and S2  Respiratory: Decreased BS Bases  Gastrointestinal:  Soft, Non-tender, + BS	  Skin: No cyanosis	  Extremities: Mild LE edema, bilateral calves soft   Foot Exam: No tissue loss               Labs:  Vital Signs Last 24 Hrs  T(C): 36.9 (09 Jun 2025 07:05), Max: 37 (09 Jun 2025 05:10)  T(F): 98.4 (09 Jun 2025 07:05), Max: 98.6 (09 Jun 2025 05:10)  HR: 87 (09 Jun 2025 07:05) (76 - 93)  BP: 150/96 (09 Jun 2025 07:05) (100/68 - 150/96)  BP(mean): --  RR: 18 (09 Jun 2025 07:05) (18 - 18)  SpO2: 94% (09 Jun 2025 07:05) (90% - 95%)    Parameters below as of 09 Jun 2025 07:05  Patient On (Oxygen Delivery Method): nasal cannula  O2 Flow (L/min): 4    Assessment:  1. Bilateral PE  Lobar and Segmental PE  2. R LE DVT  R popliteal DVT  3. HFpEF  4. Cough  5. CAD s/p PCI  6. HTN  7. History of Falls  8. EF 46%  9. SDH  10. LV thrombus    Plan:  1. Patient currently on Heparin gtt. Avoid supra-therapeutic PTT.  2. Appreciate Neurosurgical evaluation.  3. Recommend repeat CT head for surveillance of SDH, last performed on 6/7.  4. Will start group discussion with Neurosurgery and Medicine for plan of care.   5. Volume status improving on Torsemide. BNP decreased.   6. Pneumatic compression to L lower extremity (none DVT leg).   7. LE venous duplex on 6/6 for surveillance showed persistent thrombosis of the right popliteal vein without propagation.  8. Appreciate Pulmonary and Medicine.     Thank you  STEVEN Bryant, MS, [facility]  Vascular Cardiology Service    Please call with any questions:   DIRECT SERVICE NUMBER: [phone] / Available on TEAMS

## 2025-06-09 NOTE — PROGRESS NOTE ADULT - PROBLEM SELECTOR PLAN 6
TTE 7/2024: moderately reduced EF, G1DD. Has struggled with chronic LE edema, unclear how much is 2/2 to HF vs Chronic venous insufficieny (per outpatient notes appears more likely the later)   -on torsemide 40mg outpatient, resumed  -continue spironolactone 25mg daily  -continue metop 25mg daily  -previously on ace, off currently  - f/u cardiology recs

## 2025-06-10 LAB
ANION GAP SERPL CALC-SCNC: 14 MMOL/L — SIGNIFICANT CHANGE UP (ref 5–17)
APTT BLD: 49.8 SEC — HIGH (ref 26.1–36.8)
APTT BLD: 62.1 SEC — HIGH (ref 26.1–36.8)
BUN SERPL-MCNC: 31 MG/DL — HIGH (ref 7–23)
CALCIUM SERPL-MCNC: 9.6 MG/DL — SIGNIFICANT CHANGE UP (ref 8.4–10.5)
CHLORIDE SERPL-SCNC: 92 MMOL/L — LOW (ref 96–108)
CO2 SERPL-SCNC: 26 MMOL/L — SIGNIFICANT CHANGE UP (ref 22–31)
CREAT SERPL-MCNC: 1.23 MG/DL — SIGNIFICANT CHANGE UP (ref 0.5–1.3)
EGFR: 59 ML/MIN/1.73M2 — LOW
EGFR: 59 ML/MIN/1.73M2 — LOW
GLUCOSE SERPL-MCNC: 114 MG/DL — HIGH (ref 70–99)
HCT VFR BLD CALC: 43.1 % — SIGNIFICANT CHANGE UP (ref 39–50)
HGB BLD-MCNC: 14.7 G/DL — SIGNIFICANT CHANGE UP (ref 13–17)
MCHC RBC-ENTMCNC: 32.4 PG — SIGNIFICANT CHANGE UP (ref 27–34)
MCHC RBC-ENTMCNC: 34.1 G/DL — SIGNIFICANT CHANGE UP (ref 32–36)
MCV RBC AUTO: 94.9 FL — SIGNIFICANT CHANGE UP (ref 80–100)
NRBC BLD AUTO-RTO: 0 /100 WBCS — SIGNIFICANT CHANGE UP (ref 0–0)
PLATELET # BLD AUTO: 271 K/UL — SIGNIFICANT CHANGE UP (ref 150–400)
POTASSIUM SERPL-MCNC: 3.5 MMOL/L — SIGNIFICANT CHANGE UP (ref 3.5–5.3)
POTASSIUM SERPL-SCNC: 3.5 MMOL/L — SIGNIFICANT CHANGE UP (ref 3.5–5.3)
RBC # BLD: 4.54 M/UL — SIGNIFICANT CHANGE UP (ref 4.2–5.8)
RBC # FLD: 12.7 % — SIGNIFICANT CHANGE UP (ref 10.3–14.5)
SODIUM SERPL-SCNC: 132 MMOL/L — LOW (ref 135–145)
WBC # BLD: 7.45 K/UL — SIGNIFICANT CHANGE UP (ref 3.8–10.5)
WBC # FLD AUTO: 7.45 K/UL — SIGNIFICANT CHANGE UP (ref 3.8–10.5)

## 2025-06-10 PROCEDURE — 99233 SBSQ HOSP IP/OBS HIGH 50: CPT

## 2025-06-10 RX ORDER — ENOXAPARIN SODIUM 100 MG/ML
70 INJECTION SUBCUTANEOUS EVERY 12 HOURS
Refills: 0 | Status: DISCONTINUED | OUTPATIENT
Start: 2025-06-10 | End: 2025-06-10

## 2025-06-10 RX ORDER — ENOXAPARIN SODIUM 100 MG/ML
60 INJECTION SUBCUTANEOUS EVERY 12 HOURS
Refills: 0 | Status: DISCONTINUED | OUTPATIENT
Start: 2025-06-10 | End: 2025-06-19

## 2025-06-10 RX ADMIN — ESCITALOPRAM OXALATE 10 MILLIGRAM(S): 20 TABLET ORAL at 12:05

## 2025-06-10 RX ADMIN — ENOXAPARIN SODIUM 60 MILLIGRAM(S): 100 INJECTION SUBCUTANEOUS at 16:53

## 2025-06-10 RX ADMIN — IPRATROPIUM BROMIDE AND ALBUTEROL SULFATE 3 MILLILITER(S): .5; 2.5 SOLUTION RESPIRATORY (INHALATION) at 22:00

## 2025-06-10 RX ADMIN — HEPARIN SODIUM 11.5 UNIT(S)/HR: 1000 INJECTION INTRAVENOUS; SUBCUTANEOUS at 06:04

## 2025-06-10 RX ADMIN — IPRATROPIUM BROMIDE AND ALBUTEROL SULFATE 3 MILLILITER(S): .5; 2.5 SOLUTION RESPIRATORY (INHALATION) at 12:05

## 2025-06-10 RX ADMIN — IPRATROPIUM BROMIDE AND ALBUTEROL SULFATE 3 MILLILITER(S): .5; 2.5 SOLUTION RESPIRATORY (INHALATION) at 05:22

## 2025-06-10 RX ADMIN — DEXTROMETHORPHAN HBR, GUAIFENESIN 100 MILLIGRAM(S): 200 LIQUID ORAL at 21:47

## 2025-06-10 RX ADMIN — Medication 4 MILLILITER(S): at 12:07

## 2025-06-10 RX ADMIN — Medication 25 MILLIGRAM(S): at 05:22

## 2025-06-10 RX ADMIN — LINACLOTIDE 72 MICROGRAM(S): 290 CAPSULE, GELATIN COATED ORAL at 05:21

## 2025-06-10 RX ADMIN — IPRATROPIUM BROMIDE AND ALBUTEROL SULFATE 3 MILLILITER(S): .5; 2.5 SOLUTION RESPIRATORY (INHALATION) at 18:02

## 2025-06-10 RX ADMIN — Medication 4 MILLILITER(S): at 05:22

## 2025-06-10 RX ADMIN — Medication 4 MILLILITER(S): at 22:00

## 2025-06-10 RX ADMIN — HEPARIN SODIUM 11.5 UNIT(S)/HR: 1000 INJECTION INTRAVENOUS; SUBCUTANEOUS at 07:25

## 2025-06-10 RX ADMIN — Medication 4 MILLILITER(S): at 18:02

## 2025-06-10 RX ADMIN — MIRABEGRON 25 MILLIGRAM(S): 50 TABLET, FILM COATED, EXTENDED RELEASE ORAL at 12:05

## 2025-06-10 RX ADMIN — ROSUVASTATIN CALCIUM 10 MILLIGRAM(S): 20 TABLET, FILM COATED ORAL at 21:45

## 2025-06-10 RX ADMIN — METOPROLOL SUCCINATE 25 MILLIGRAM(S): 50 TABLET, EXTENDED RELEASE ORAL at 05:21

## 2025-06-10 RX ADMIN — Medication 40 MILLIGRAM(S): at 05:21

## 2025-06-10 RX ADMIN — Medication 100 MILLIGRAM(S): at 12:05

## 2025-06-10 NOTE — PROGRESS NOTE ADULT - SUBJECTIVE AND OBJECTIVE BOX
Vascular Cardiology Progress Note     DIRECT PROVIDER NUMBER: [phone]  / Available on TEAMS    CC:  weakness and DVT    Interval Events:  Remains on Heparin gtt since 6/7.  Reports breathing improved.  Reduced cough.   CT head on 6/9 read as small anterior parafalcine subdural hemorrhage, smaller in size as compared to the prior study. No new hemorrhage.    MEDICATIONS  (STANDING):  albuterol/ipratropium for Nebulization 3 milliLiter(s) Nebulizer every 6 hours  allopurinol 100 milliGRAM(s) Oral daily  escitalopram 10 milliGRAM(s) Oral daily  heparin  Infusion 1150 Unit(s)/Hr (11.5 mL/Hr) IV Continuous <Continuous>  linaclotide 72 MICROGram(s) Oral before breakfast  metoprolol succinate ER 25 milliGRAM(s) Oral daily  mirabegron ER 25 milliGRAM(s) Oral daily  rosuvastatin 10 milliGRAM(s) Oral at bedtime  sodium chloride 3%  Inhalation 4 milliLiter(s) Inhalation every 6 hours  spironolactone 25 milliGRAM(s) Oral daily  torsemide 40 milliGRAM(s) Oral daily    PAST MEDICAL & SURGICAL HISTORY:  Chronic diastolic congestive heart failure  CAD (coronary artery disease)  No significant past surgical history    FAMILY HISTORY:  No pertinent family history in first degree relatives    SOCIAL HISTORY:  unchanged    REVIEW OF SYSTEMS:  CONSTITUTIONAL: No fever  EYES: No eye pain  ENT:  No throat pain  NECK: No pain   RESPIRATORY: SOB on admission    CARDIOVASCULAR:  No C/P  GASTROINTESTINAL: No abdominal pain  GENITOURINARY: No hematuria  SKIN: No LE wounds  LYMPH Nodes: No enlarged glands noted  ENDOCRINE: No heat or cold intolerance noted  MUSCULOSKELETAL:  LE edema  PSYCHIATRIC: No depression, anxiety  HEME/LYMPH: No bleeding gums  ALLERGY AND IMMUNOLOGIC: No hives    [ x] All others negative	    Vital Signs Last 24 Hrs  T(C): 36.4 (10 Jean 2025 04:42), Max: 36.5 (09 Jun 2025 15:58)  T(F): 97.5 (10 Jean 2025 04:42), Max: 97.7 (09 Jun 2025 15:58)  HR: 77 (10 Jean 2025 04:42) (74 - 83)  BP: 117/78 (10 Jean 2025 04:42) (117/78 - 134/71)  BP(mean): --  RR: 18 (10 Jean 2025 04:42) (18 - 18)  SpO2: 93% (10 Jean 2025 04:42) (91% - 94%)    Parameters below as of 10 Jean 2025 04:42  Patient On (Oxygen Delivery Method): nasal cannula  O2 Flow (L/min): 2      Appearance: NAD  Cardiovascular: RRR, S1 and S2  Respiratory: Decreased BS Bases  Gastrointestinal:  Soft, Non-tender, + BS	  Skin: No cyanosis	  Extremities: Mild LE edema, bilateral calves soft   Foot Exam: No tissue loss               Labs:                        14.7   7.45  )-----------( 271      ( 10 Jean 2025 06:52 )             43.1     06-10    132[L]  |  92[L]  |  31[H]  ----------------------------<  114[H]  3.5   |  26  |  1.23    Ca    9.6      10 Jean 2025 06:52      PT/INR - ( 09 Jun 2025 05:46 )   PT: 12.2 sec;   INR: 1.07 ratio      PTT - ( 10 Jean 2025 05:11 )  PTT:62.1 sec    Urinalysis Basic - ( 10 Jean 2025 06:52 )    Color: x / Appearance: x / SG: x / pH: x  Gluc: 114 mg/dL / Ketone: x  / Bili: x / Urobili: x   Blood: x / Protein: x / Nitrite: x   Leuk Esterase: x / RBC: x / WBC x   Sq Epi: x / Non Sq Epi: x / Bacteria: x      Assessment:  1. Bilateral PE  Lobar and Segmental PE  2. R LE DVT  R popliteal DVT  3. HFpEF  4. Cough  5. CAD s/p PCI  6. HTN  7. History of Falls  8. EF 46%  9. SDH  10. LV thrombus    Plan:  1. Patient currently on Heparin gtt. Avoid supra-therapeutic PTT.  2. Appreciate Neurosurgical evaluation.  3. Repeat CT head on 6/9 for surveillance of SDH, read as smaller and with no new hemorrhage.  4. LE venous duplex on 6/6 for surveillance showed persistent thrombosis of the right popliteal vein without propagation.  5. Group discussion with Neurosurgery and Medicine started to discuss plan of care.  6. If safe from a neurosurgical perspective, would transition to Lovenox 0.7 mg/kg BID (70% of therapeutic dose).  Dose modification recommended in setting of SDH. Also paired with surveillance.  7. Recommend repeat CT head after 2 doses of Lovenox.  8. Recommend repeat TTE in 5-7 days for surveillance of LV thrombus.  9. Recommend repeat LE venous duplex in 5 days for surveillance of DVT.   10. Volume status improving on Torsemide. BNP decreased.   11. Pneumatic compression to L lower extremity.  12. Appreciate Pulmonary and Medicine.     Thank you  STEVEN Bryant, MS, [facility]  Vascular Cardiology Service    Please call with any questions:   DIRECT SERVICE NUMBER: [phone] / Available on TEAMS

## 2025-06-10 NOTE — PHARMACOTHERAPY INTERVENTION NOTE - COMMENTS
85 y/o M with past medical history of HTN, HLD, CAD with stents on DAPT, CHF presenting from the [facility] with falls. Duplex with RLE DVT, started on hep gtt.  (31 May 2025 12:52), Vascular Cardiology and Neurosurgery recommended to transition to Lovenox with reduced dose to 0.7 mg/kg BID.     Previous weight documented was 100.1kg from 5/31/25. Spoke with ACP, RN reweighed patient today with currently weight at 89.8 kg. Requesting to reduced dose of Lovenox 70 mg SQ twice a day to Lovenox 60 mg SQ twice a day reflecting new weight.    Nicholas (Krishna Flood) Sai - PharmD, BCPS  Transitions of Care Pharmacist  Available on Microsoft Teams (Preferred)

## 2025-06-10 NOTE — PROGRESS NOTE ADULT - PROBLEM SELECTOR PLAN 6
TTE 7/2024: moderately reduced EF, G1DD. Has struggled with chronic LE edema, unclear how much is 2/2 to HF vs Chronic venous insufficieny (per outpatient notes appears more likely the later)   -continue torsemide 40mg   -continue spironolactone 25mg daily  -continue metop 25mg daily  -previously on ace, off currently  - f/u cardiology recs

## 2025-06-10 NOTE — PROGRESS NOTE ADULT - SUBJECTIVE AND OBJECTIVE BOX
Sharmila Ruffin DO  Division of Hospital Medicine  Available on MS Teams    SUBJECTIVE / OVERNIGHT EVENTS:  NAEO, feels cough and SOB improving.    MEDICATIONS  (STANDING):  albuterol/ipratropium for Nebulization 3 milliLiter(s) Nebulizer every 6 hours  allopurinol 100 milliGRAM(s) Oral daily  enoxaparin Injectable 70 milliGRAM(s) SubCutaneous every 12 hours  escitalopram 10 milliGRAM(s) Oral daily  linaclotide 72 MICROGram(s) Oral before breakfast  metoprolol succinate ER 25 milliGRAM(s) Oral daily  mirabegron ER 25 milliGRAM(s) Oral daily  rosuvastatin 10 milliGRAM(s) Oral at bedtime  sodium chloride 3%  Inhalation 4 milliLiter(s) Inhalation every 6 hours  spironolactone 25 milliGRAM(s) Oral daily  torsemide 40 milliGRAM(s) Oral daily    MEDICATIONS  (PRN):  guaiFENesin Oral Liquid (Sugar-Free) 100 milliGRAM(s) Oral every 6 hours PRN Cough        I&O's Summary    09 Jun 2025 07:01  -  10 Jean 2025 07:00  --------------------------------------------------------  IN: 240 mL / OUT: 1475 mL / NET: -1235 mL            PHYSICAL EXAM:  Vital Signs Last 24 Hrs  T(C): 36.4 (10 Jean 2025 11:12), Max: 36.5 (09 Jun 2025 15:58)  T(F): 97.5 (10 Jean 2025 11:12), Max: 97.7 (09 Jun 2025 15:58)  HR: 79 (10 Jean 2025 11:12) (74 - 83)  BP: 126/85 (10 Jean 2025 11:12) (117/78 - 134/71)  BP(mean): --  RR: 18 (10 Jean 2025 11:12) (18 - 18)  SpO2: 94% (10 Jean 2025 11:12) (91% - 94%)    Parameters below as of 10 Jean 2025 11:12  Patient On (Oxygen Delivery Method): nasal cannula  O2 Flow (L/min): 2      CONSTITUTIONAL: NAD  EYES: EOMI; conjunctiva and sclera clear  ENMT: Moist oral mucosa  RESPIRATORY: no accessory muscle use, no wheezing, regular respiratory rate  CARDIOVASCULAR: RRR, no murmurs, no LE edema  ABDOMEN: Nontender to palpation, mild distention, no rebound/guarding  PSYCH: A+O x3  NEUROLOGY: mild R facial droop (baseline), no new FND      LABS:                         14.7   7.45  )-----------( 271      ( 10 Jean 2025 06:52 )             43.1     06-10    132[L]  |  92[L]  |  31[H]  ----------------------------<  114[H]  3.5   |  26  |  1.23    Ca    9.6      10 Jean 2025 06:52      PT/INR - ( 09 Jun 2025 05:46 )   PT: 12.2 sec;   INR: 1.07 ratio         PTT - ( 10 Jean 2025 13:29 )  PTT:49.8 sec  Urinalysis Basic - ( 10 Jean 2025 06:52 )    Color: x / Appearance: x / SG: x / pH: x  Gluc: 114 mg/dL / Ketone: x  / Bili: x / Urobili: x   Blood: x / Protein: x / Nitrite: x   Leuk Esterase: x / RBC: x / WBC x   Sq Epi: x / Non Sq Epi: x / Bacteria: x      Labs and imaging reviewed

## 2025-06-10 NOTE — CHART NOTE - NSCHARTNOTEFT_GEN_A_CORE
Therapeutic on heparin, CTH done, stable to improved parafalcine SDH. Recommend repeat CTH 48hrs after transition off heparin drip to long term AC. If stable no further neurosurgical intervention needed.    -Recommend outpatient followup with Dr. Tam in neurotrauma clinic within 1-2 weeks of discharge  -Reconsult neurosurgery as needed.

## 2025-06-11 ENCOUNTER — RESULT REVIEW (OUTPATIENT)
Age: 85
End: 2025-06-11

## 2025-06-11 LAB
ANION GAP SERPL CALC-SCNC: 14 MMOL/L — SIGNIFICANT CHANGE UP (ref 5–17)
BUN SERPL-MCNC: 34 MG/DL — HIGH (ref 7–23)
CALCIUM SERPL-MCNC: 9.7 MG/DL — SIGNIFICANT CHANGE UP (ref 8.4–10.5)
CHLORIDE SERPL-SCNC: 94 MMOL/L — LOW (ref 96–108)
CO2 SERPL-SCNC: 26 MMOL/L — SIGNIFICANT CHANGE UP (ref 22–31)
CREAT SERPL-MCNC: 1.38 MG/DL — HIGH (ref 0.5–1.3)
EGFR: 51 ML/MIN/1.73M2 — LOW
EGFR: 51 ML/MIN/1.73M2 — LOW
GLUCOSE SERPL-MCNC: 114 MG/DL — HIGH (ref 70–99)
HCT VFR BLD CALC: 41.7 % — SIGNIFICANT CHANGE UP (ref 39–50)
HGB BLD-MCNC: 14.5 G/DL — SIGNIFICANT CHANGE UP (ref 13–17)
MCHC RBC-ENTMCNC: 32.8 PG — SIGNIFICANT CHANGE UP (ref 27–34)
MCHC RBC-ENTMCNC: 34.8 G/DL — SIGNIFICANT CHANGE UP (ref 32–36)
MCV RBC AUTO: 94.3 FL — SIGNIFICANT CHANGE UP (ref 80–100)
NRBC BLD AUTO-RTO: 0 /100 WBCS — SIGNIFICANT CHANGE UP (ref 0–0)
PLATELET # BLD AUTO: 264 K/UL — SIGNIFICANT CHANGE UP (ref 150–400)
POTASSIUM SERPL-MCNC: 3.6 MMOL/L — SIGNIFICANT CHANGE UP (ref 3.5–5.3)
POTASSIUM SERPL-SCNC: 3.6 MMOL/L — SIGNIFICANT CHANGE UP (ref 3.5–5.3)
RBC # BLD: 4.42 M/UL — SIGNIFICANT CHANGE UP (ref 4.2–5.8)
RBC # FLD: 12.7 % — SIGNIFICANT CHANGE UP (ref 10.3–14.5)
SODIUM SERPL-SCNC: 134 MMOL/L — LOW (ref 135–145)
WBC # BLD: 8.53 K/UL — SIGNIFICANT CHANGE UP (ref 3.8–10.5)
WBC # FLD AUTO: 8.53 K/UL — SIGNIFICANT CHANGE UP (ref 3.8–10.5)

## 2025-06-11 PROCEDURE — 93970 EXTREMITY STUDY: CPT | Mod: 26

## 2025-06-11 PROCEDURE — 71045 X-RAY EXAM CHEST 1 VIEW: CPT | Mod: 26

## 2025-06-11 PROCEDURE — 99233 SBSQ HOSP IP/OBS HIGH 50: CPT

## 2025-06-11 PROCEDURE — 93308 TTE F-UP OR LMTD: CPT | Mod: 26

## 2025-06-11 RX ORDER — SODIUM CHLORIDE 0.65 %
1 AEROSOL, SPRAY (ML) NASAL THREE TIMES A DAY
Refills: 0 | Status: DISCONTINUED | OUTPATIENT
Start: 2025-06-11 | End: 2025-06-19

## 2025-06-11 RX ORDER — DEXTROMETHORPHAN HBR, GUAIFENESIN 200 MG/10ML
600 LIQUID ORAL EVERY 12 HOURS
Refills: 0 | Status: DISCONTINUED | OUTPATIENT
Start: 2025-06-11 | End: 2025-06-19

## 2025-06-11 RX ADMIN — DEXTROMETHORPHAN HBR, GUAIFENESIN 100 MILLIGRAM(S): 200 LIQUID ORAL at 18:37

## 2025-06-11 RX ADMIN — ESCITALOPRAM OXALATE 10 MILLIGRAM(S): 20 TABLET ORAL at 13:10

## 2025-06-11 RX ADMIN — IPRATROPIUM BROMIDE AND ALBUTEROL SULFATE 3 MILLILITER(S): .5; 2.5 SOLUTION RESPIRATORY (INHALATION) at 13:11

## 2025-06-11 RX ADMIN — Medication 4 MILLILITER(S): at 17:43

## 2025-06-11 RX ADMIN — Medication 100 MILLIGRAM(S): at 13:11

## 2025-06-11 RX ADMIN — Medication 4 MILLILITER(S): at 06:53

## 2025-06-11 RX ADMIN — Medication 25 MILLIGRAM(S): at 06:54

## 2025-06-11 RX ADMIN — IPRATROPIUM BROMIDE AND ALBUTEROL SULFATE 3 MILLILITER(S): .5; 2.5 SOLUTION RESPIRATORY (INHALATION) at 17:43

## 2025-06-11 RX ADMIN — MIRABEGRON 25 MILLIGRAM(S): 50 TABLET, FILM COATED, EXTENDED RELEASE ORAL at 13:11

## 2025-06-11 RX ADMIN — ROSUVASTATIN CALCIUM 10 MILLIGRAM(S): 20 TABLET, FILM COATED ORAL at 21:57

## 2025-06-11 RX ADMIN — ENOXAPARIN SODIUM 60 MILLIGRAM(S): 100 INJECTION SUBCUTANEOUS at 06:52

## 2025-06-11 RX ADMIN — Medication 4 MILLILITER(S): at 13:11

## 2025-06-11 RX ADMIN — METOPROLOL SUCCINATE 25 MILLIGRAM(S): 50 TABLET, EXTENDED RELEASE ORAL at 06:53

## 2025-06-11 RX ADMIN — Medication 40 MILLIGRAM(S): at 06:53

## 2025-06-11 RX ADMIN — IPRATROPIUM BROMIDE AND ALBUTEROL SULFATE 3 MILLILITER(S): .5; 2.5 SOLUTION RESPIRATORY (INHALATION) at 06:55

## 2025-06-11 RX ADMIN — LINACLOTIDE 72 MICROGRAM(S): 290 CAPSULE, GELATIN COATED ORAL at 06:53

## 2025-06-11 RX ADMIN — ENOXAPARIN SODIUM 60 MILLIGRAM(S): 100 INJECTION SUBCUTANEOUS at 17:42

## 2025-06-11 RX ADMIN — DEXTROMETHORPHAN HBR, GUAIFENESIN 100 MILLIGRAM(S): 200 LIQUID ORAL at 13:12

## 2025-06-11 NOTE — PROGRESS NOTE ADULT - SUBJECTIVE AND OBJECTIVE BOX
Sharmila Ruffin DO  Division of Hospital Medicine  Available on MS Teams    SUBJECTIVE / OVERNIGHT EVENTS:  Required suctioning for cough overnight. Denies any headache, dizziness, chest pain, or SOB.     MEDICATIONS  (STANDING):  albuterol/ipratropium for Nebulization 3 milliLiter(s) Nebulizer every 6 hours  allopurinol 100 milliGRAM(s) Oral daily  enoxaparin Injectable 60 milliGRAM(s) SubCutaneous every 12 hours  escitalopram 10 milliGRAM(s) Oral daily  guaiFENesin  milliGRAM(s) Oral every 12 hours  linaclotide 72 MICROGram(s) Oral before breakfast  metoprolol succinate ER 25 milliGRAM(s) Oral daily  mirabegron ER 25 milliGRAM(s) Oral daily  rosuvastatin 10 milliGRAM(s) Oral at bedtime  sodium chloride 3%  Inhalation 4 milliLiter(s) Inhalation every 6 hours  spironolactone 25 milliGRAM(s) Oral daily  torsemide 40 milliGRAM(s) Oral daily    MEDICATIONS  (PRN):  guaiFENesin Oral Liquid (Sugar-Free) 100 milliGRAM(s) Oral every 6 hours PRN Cough  sodium chloride 0.65% Nasal 1 Spray(s) Both Nostrils three times a day PRN Nasal Congestion        I&O's Summary    10 Jean 2025 07:01  -  11 Jun 2025 07:00  --------------------------------------------------------  IN: 720 mL / OUT: 0 mL / NET: 720 mL    PHYSICAL EXAM:  Vital Signs Last 24 Hrs  T(C): 36.4 (11 Jun 2025 04:44), Max: 36.7 (11 Jun 2025 00:12)  T(F): 97.6 (11 Jun 2025 04:44), Max: 98 (11 Jun 2025 00:12)  HR: 78 (11 Jun 2025 04:44) (78 - 90)  BP: 103/68 (11 Jun 2025 04:44) (103/68 - 147/96)  BP(mean): --  RR: 18 (11 Jun 2025 04:44) (18 - 18)  SpO2: 93% (11 Jun 2025 04:44) (92% - 94%)    Parameters below as of 11 Jun 2025 04:44  Patient On (Oxygen Delivery Method): nasal cannula  O2 Flow (L/min): 2        CONSTITUTIONAL: NAD  EYES: EOMI; conjunctiva and sclera clear  ENMT: Moist oral mucosa  RESPIRATORY: no accessory muscle use, no wheezing, regular respiratory rate  CARDIOVASCULAR: RRR, no murmurs, no LE edema  ABDOMEN: Nontender to palpation, mild distention, no rebound/guarding  PSYCH: A+O x3  NEUROLOGY: mild R facial droop (baseline), no new FND    LABS:                         14.5   8.53  )-----------( 264      ( 11 Jun 2025 06:59 )             41.7     06-11    134[L]  |  94[L]  |  34[H]  ----------------------------<  114[H]  3.6   |  26  |  1.38[H]    Ca    9.7      11 Jun 2025 06:57      PTT - ( 10 Jean 2025 13:29 )  PTT:49.8 sec  Urinalysis Basic - ( 11 Jun 2025 06:57 )    Color: x / Appearance: x / SG: x / pH: x  Gluc: 114 mg/dL / Ketone: x  / Bili: x / Urobili: x   Blood: x / Protein: x / Nitrite: x   Leuk Esterase: x / RBC: x / WBC x   Sq Epi: x / Non Sq Epi: x / Bacteria: x          Labs and imaging reviewed

## 2025-06-11 NOTE — PROGRESS NOTE ADULT - SUBJECTIVE AND OBJECTIVE BOX
Vascular Cardiology Progress Note     DIRECT PROVIDER NUMBER: [phone]  / Available on TEAMS    CC:  weakness and DVT    Interval Events:  Transitioned to Lovenox 60 mg BID on 6/10.   Reports breathing improved.  Reduced cough.     MEDICATIONS  (STANDING):  albuterol/ipratropium for Nebulization 3 milliLiter(s) Nebulizer every 6 hours  allopurinol 100 milliGRAM(s) Oral daily  enoxaparin Injectable 60 milliGRAM(s) SubCutaneous every 12 hours  escitalopram 10 milliGRAM(s) Oral daily  linaclotide 72 MICROGram(s) Oral before breakfast  metoprolol succinate ER 25 milliGRAM(s) Oral daily  mirabegron ER 25 milliGRAM(s) Oral daily  rosuvastatin 10 milliGRAM(s) Oral at bedtime  sodium chloride 3%  Inhalation 4 milliLiter(s) Inhalation every 6 hours  spironolactone 25 milliGRAM(s) Oral daily  torsemide 40 milliGRAM(s) Oral daily    PAST MEDICAL & SURGICAL HISTORY:  Chronic diastolic congestive heart failure  CAD (coronary artery disease)  No significant past surgical history    FAMILY HISTORY:  No pertinent family history in first degree relatives    SOCIAL HISTORY:  unchanged    REVIEW OF SYSTEMS:  CONSTITUTIONAL: No fever  EYES: No eye pain  ENT:  No throat pain  NECK: No pain   RESPIRATORY: SOB on admission    CARDIOVASCULAR:  No C/P  GASTROINTESTINAL: No abdominal pain  GENITOURINARY: No hematuria  SKIN: No LE wounds  LYMPH Nodes: No enlarged glands noted  ENDOCRINE: No heat or cold intolerance noted  MUSCULOSKELETAL:  LE edema  PSYCHIATRIC: No depression, anxiety  HEME/LYMPH: No bleeding gums  ALLERGY AND IMMUNOLOGIC: No hives    [ x] All others negative	    Vital Signs Last 24 Hrs  T(C): 36.4 (11 Jun 2025 04:44), Max: 36.7 (11 Jun 2025 00:12)  T(F): 97.6 (11 Jun 2025 04:44), Max: 98 (11 Jun 2025 00:12)  HR: 78 (11 Jun 2025 04:44) (78 - 90)  BP: 103/68 (11 Jun 2025 04:44) (103/68 - 147/96)  BP(mean): --  RR: 18 (11 Jun 2025 04:44) (18 - 18)  SpO2: 93% (11 Jun 2025 04:44) (92% - 94%)    Parameters below as of 11 Jun 2025 04:44  Patient On (Oxygen Delivery Method): nasal cannula  O2 Flow (L/min): 2      Appearance: NAD  Cardiovascular: RRR, S1 and S2  Respiratory: Decreased BS Bases  Gastrointestinal:  Soft, Non-tender, + BS	  Skin: No cyanosis	  Extremities: Mild LE edema, bilateral calves soft   Foot Exam: No tissue loss               Labs:                        14.5   8.53  )-----------( 264      ( 11 Jun 2025 06:59 )             41.7     06-11    134[L]  |  94[L]  |  34[H]  ----------------------------<  114[H]  3.6   |  26  |  1.38[H]    Ca    9.7      11 Jun 2025 06:57    PTT - ( 10 Jean 2025 13:29 )  PTT:49.8 sec    Urinalysis Basic - ( 11 Jun 2025 06:57 )    Color: x / Appearance: x / SG: x / pH: x  Gluc: 114 mg/dL / Ketone: x  / Bili: x / Urobili: x   Blood: x / Protein: x / Nitrite: x   Leuk Esterase: x / RBC: x / WBC x   Sq Epi: x / Non Sq Epi: x / Bacteria: x      Assessment:  1. Bilateral PE  Lobar and Segmental PE  2. R LE DVT  R popliteal DVT  3. HFpEF  4. Cough  5. CAD s/p PCI  6. HTN  7. History of Falls  8. EF 46%  9. SDH  10. LV thrombus    Plan:  1. Transitioned to Lovenox 60 mg BID  (70% of therapeutic dose) on 6/10.  Dose modification recommended in setting of SDH. Also paired with surveillance (TTE and LE venous duplex).  2. Appreciate Neurosurgical evaluation.  3. Repeat CT head on 6/9 for surveillance of SDH, read as smaller and with no new hemorrhage.  4. LE venous duplex on 6/6 for surveillance showed persistent thrombosis of the right popliteal vein without propagation.  5. Recommend repeat CT head 24 to 48 hours after transition to Lovenox.  6. Recommend repeat TTE in 5 days for surveillance of LV thrombus.  7. Recommend repeat LE venous duplex 5 days from last study for surveillance of DVT.   8. Volume status improving on Torsemide. BNP decreased.   9. Pneumatic compression to L lower extremity.  10. Appreciate Pulmonary and Medicine.     Thank you  Feyaz Viridiana, PA, MS, [facility]  Vascular Cardiology Service    Please call with any questions:   DIRECT SERVICE NUMBER: [phone] / Available on TEAMS

## 2025-06-12 LAB
ANION GAP SERPL CALC-SCNC: 18 MMOL/L — HIGH (ref 5–17)
BUN SERPL-MCNC: 34 MG/DL — HIGH (ref 7–23)
CALCIUM SERPL-MCNC: 9.5 MG/DL — SIGNIFICANT CHANGE UP (ref 8.4–10.5)
CHLORIDE SERPL-SCNC: 93 MMOL/L — LOW (ref 96–108)
CO2 SERPL-SCNC: 24 MMOL/L — SIGNIFICANT CHANGE UP (ref 22–31)
CREAT SERPL-MCNC: 1.5 MG/DL — HIGH (ref 0.5–1.3)
EGFR: 46 ML/MIN/1.73M2 — LOW
EGFR: 46 ML/MIN/1.73M2 — LOW
GLUCOSE SERPL-MCNC: 123 MG/DL — HIGH (ref 70–99)
HCT VFR BLD CALC: 41.2 % — SIGNIFICANT CHANGE UP (ref 39–50)
HGB BLD-MCNC: 14.2 G/DL — SIGNIFICANT CHANGE UP (ref 13–17)
MCHC RBC-ENTMCNC: 32.9 PG — SIGNIFICANT CHANGE UP (ref 27–34)
MCHC RBC-ENTMCNC: 34.5 G/DL — SIGNIFICANT CHANGE UP (ref 32–36)
MCV RBC AUTO: 95.4 FL — SIGNIFICANT CHANGE UP (ref 80–100)
NRBC BLD AUTO-RTO: 0 /100 WBCS — SIGNIFICANT CHANGE UP (ref 0–0)
PLATELET # BLD AUTO: 258 K/UL — SIGNIFICANT CHANGE UP (ref 150–400)
POTASSIUM SERPL-MCNC: 3.6 MMOL/L — SIGNIFICANT CHANGE UP (ref 3.5–5.3)
POTASSIUM SERPL-SCNC: 3.6 MMOL/L — SIGNIFICANT CHANGE UP (ref 3.5–5.3)
RBC # BLD: 4.32 M/UL — SIGNIFICANT CHANGE UP (ref 4.2–5.8)
RBC # FLD: 12.7 % — SIGNIFICANT CHANGE UP (ref 10.3–14.5)
SODIUM SERPL-SCNC: 135 MMOL/L — SIGNIFICANT CHANGE UP (ref 135–145)
WBC # BLD: 8.35 K/UL — SIGNIFICANT CHANGE UP (ref 3.8–10.5)
WBC # FLD AUTO: 8.35 K/UL — SIGNIFICANT CHANGE UP (ref 3.8–10.5)

## 2025-06-12 PROCEDURE — 99233 SBSQ HOSP IP/OBS HIGH 50: CPT

## 2025-06-12 PROCEDURE — 70450 CT HEAD/BRAIN W/O DYE: CPT | Mod: 26

## 2025-06-12 RX ORDER — ACETYLCYSTEINE 200 MG/ML
4 INHALANT RESPIRATORY (INHALATION) EVERY 4 HOURS
Refills: 0 | Status: DISCONTINUED | OUTPATIENT
Start: 2025-06-12 | End: 2025-06-19

## 2025-06-12 RX ADMIN — Medication 100 MILLIGRAM(S): at 12:06

## 2025-06-12 RX ADMIN — ESCITALOPRAM OXALATE 10 MILLIGRAM(S): 20 TABLET ORAL at 12:06

## 2025-06-12 RX ADMIN — ENOXAPARIN SODIUM 60 MILLIGRAM(S): 100 INJECTION SUBCUTANEOUS at 05:09

## 2025-06-12 RX ADMIN — ACETYLCYSTEINE 4 MILLILITER(S): 200 INHALANT RESPIRATORY (INHALATION) at 16:08

## 2025-06-12 RX ADMIN — IPRATROPIUM BROMIDE AND ALBUTEROL SULFATE 3 MILLILITER(S): .5; 2.5 SOLUTION RESPIRATORY (INHALATION) at 16:08

## 2025-06-12 RX ADMIN — DEXTROMETHORPHAN HBR, GUAIFENESIN 100 MILLIGRAM(S): 200 LIQUID ORAL at 22:39

## 2025-06-12 RX ADMIN — Medication 4 MILLILITER(S): at 00:11

## 2025-06-12 RX ADMIN — IPRATROPIUM BROMIDE AND ALBUTEROL SULFATE 3 MILLILITER(S): .5; 2.5 SOLUTION RESPIRATORY (INHALATION) at 22:39

## 2025-06-12 RX ADMIN — Medication 4 MILLILITER(S): at 12:05

## 2025-06-12 RX ADMIN — IPRATROPIUM BROMIDE AND ALBUTEROL SULFATE 3 MILLILITER(S): .5; 2.5 SOLUTION RESPIRATORY (INHALATION) at 05:09

## 2025-06-12 RX ADMIN — IPRATROPIUM BROMIDE AND ALBUTEROL SULFATE 3 MILLILITER(S): .5; 2.5 SOLUTION RESPIRATORY (INHALATION) at 00:11

## 2025-06-12 RX ADMIN — ACETYLCYSTEINE 4 MILLILITER(S): 200 INHALANT RESPIRATORY (INHALATION) at 12:05

## 2025-06-12 RX ADMIN — Medication 1 SPRAY(S): at 16:09

## 2025-06-12 RX ADMIN — ROSUVASTATIN CALCIUM 10 MILLIGRAM(S): 20 TABLET, FILM COATED ORAL at 22:39

## 2025-06-12 RX ADMIN — Medication 4 MILLILITER(S): at 05:09

## 2025-06-12 RX ADMIN — IPRATROPIUM BROMIDE AND ALBUTEROL SULFATE 3 MILLILITER(S): .5; 2.5 SOLUTION RESPIRATORY (INHALATION) at 12:05

## 2025-06-12 RX ADMIN — DEXTROMETHORPHAN HBR, GUAIFENESIN 100 MILLIGRAM(S): 200 LIQUID ORAL at 16:08

## 2025-06-12 RX ADMIN — Medication 4 MILLILITER(S): at 16:09

## 2025-06-12 RX ADMIN — DEXTROMETHORPHAN HBR, GUAIFENESIN 600 MILLIGRAM(S): 200 LIQUID ORAL at 05:09

## 2025-06-12 RX ADMIN — DEXTROMETHORPHAN HBR, GUAIFENESIN 100 MILLIGRAM(S): 200 LIQUID ORAL at 05:04

## 2025-06-12 RX ADMIN — Medication 4 MILLILITER(S): at 22:39

## 2025-06-12 RX ADMIN — DEXTROMETHORPHAN HBR, GUAIFENESIN 100 MILLIGRAM(S): 200 LIQUID ORAL at 12:05

## 2025-06-12 RX ADMIN — Medication 40 MILLIGRAM(S): at 05:04

## 2025-06-12 RX ADMIN — Medication 25 MILLIGRAM(S): at 05:07

## 2025-06-12 RX ADMIN — METOPROLOL SUCCINATE 25 MILLIGRAM(S): 50 TABLET, EXTENDED RELEASE ORAL at 05:05

## 2025-06-12 RX ADMIN — ENOXAPARIN SODIUM 60 MILLIGRAM(S): 100 INJECTION SUBCUTANEOUS at 16:08

## 2025-06-12 RX ADMIN — MIRABEGRON 25 MILLIGRAM(S): 50 TABLET, FILM COATED, EXTENDED RELEASE ORAL at 12:06

## 2025-06-12 RX ADMIN — ACETYLCYSTEINE 4 MILLILITER(S): 200 INHALANT RESPIRATORY (INHALATION) at 22:40

## 2025-06-12 RX ADMIN — LINACLOTIDE 72 MICROGRAM(S): 290 CAPSULE, GELATIN COATED ORAL at 05:07

## 2025-06-12 NOTE — PROGRESS NOTE ADULT - SUBJECTIVE AND OBJECTIVE BOX
Sharmila Ruffin DO  Division of Hospital Medicine  Available on MS Teams    SUBJECTIVE / OVERNIGHT EVENTS:  Cough improving, denies any chest pain, SOB, headaches, or visual disturbances     MEDICATIONS  (STANDING):  albuterol/ipratropium for Nebulization 3 milliLiter(s) Nebulizer every 6 hours  allopurinol 100 milliGRAM(s) Oral daily  enoxaparin Injectable 60 milliGRAM(s) SubCutaneous every 12 hours  escitalopram 10 milliGRAM(s) Oral daily  guaiFENesin  milliGRAM(s) Oral every 12 hours  linaclotide 72 MICROGram(s) Oral before breakfast  metoprolol succinate ER 25 milliGRAM(s) Oral daily  mirabegron ER 25 milliGRAM(s) Oral daily  rosuvastatin 10 milliGRAM(s) Oral at bedtime  sodium chloride 3%  Inhalation 4 milliLiter(s) Inhalation every 6 hours  spironolactone 25 milliGRAM(s) Oral daily  torsemide 40 milliGRAM(s) Oral daily    MEDICATIONS  (PRN):  acetylcysteine 10%  Inhalation 4 milliLiter(s) Inhalation every 4 hours PRN increased secretions  guaiFENesin Oral Liquid (Sugar-Free) 100 milliGRAM(s) Oral every 6 hours PRN Cough  sodium chloride 0.65% Nasal 1 Spray(s) Both Nostrils three times a day PRN Nasal Congestion      I&O's Summary    11 Jun 2025 07:01  -  12 Jun 2025 07:00  --------------------------------------------------------  IN: 240 mL / OUT: 850 mL / NET: -610 mL        PHYSICAL EXAM:  Vital Signs Last 24 Hrs  T(C): 36.8 (12 Jun 2025 12:18), Max: 37.1 (11 Jun 2025 19:47)  T(F): 98.2 (12 Jun 2025 12:18), Max: 98.7 (11 Jun 2025 19:47)  HR: 82 (12 Jun 2025 12:18) (70 - 86)  BP: 138/80 (12 Jun 2025 12:18) (103/70 - 138/80)  BP(mean): --  RR: 18 (12 Jun 2025 12:18) (18 - 18)  SpO2: 93% (12 Jun 2025 12:18) (91% - 97%)    Parameters below as of 12 Jun 2025 12:18  Patient On (Oxygen Delivery Method): nasal cannula  O2 Flow (L/min): 2      CONSTITUTIONAL: NAD  EYES: EOMI; conjunctiva and sclera clear  ENMT: Moist oral mucosa  RESPIRATORY: no accessory muscle use, no wheezing, regular respiratory rate  CARDIOVASCULAR: RRR, no murmurs, no LE edema  ABDOMEN: Nontender to palpation, mild distention, no rebound/guarding  PSYCH: A+O x3  NEUROLOGY: mild R facial droop (baseline), no new FND    LABS:                         14.2   8.35  )-----------( 258      ( 12 Jun 2025 07:16 )             41.2     06-12    135  |  93[L]  |  34[H]  ----------------------------<  123[H]  3.6   |  24  |  1.50[H]    Ca    9.5      12 Jun 2025 07:16        Urinalysis Basic - ( 12 Jun 2025 07:16 )    Color: x / Appearance: x / SG: x / pH: x  Gluc: 123 mg/dL / Ketone: x  / Bili: x / Urobili: x   Blood: x / Protein: x / Nitrite: x   Leuk Esterase: x / RBC: x / WBC x   Sq Epi: x / Non Sq Epi: x / Bacteria: x        Labs and imaging reviewed

## 2025-06-12 NOTE — PROGRESS NOTE ADULT - SUBJECTIVE AND OBJECTIVE BOX
Vascular Cardiology Progress Note     DIRECT PROVIDER NUMBER: [phone]  / Available on TEAMS    CC:  weakness and DVT    Interval Events:    Lovenox 60 mg BID on 6/10.   Reports breathing improved.  Reduced cough.   Daughter at bedside  he is eating dinner       MEDICATIONS  (STANDING):  albuterol/ipratropium for Nebulization 3 milliLiter(s) Nebulizer every 6 hours  allopurinol 100 milliGRAM(s) Oral daily  enoxaparin Injectable 60 milliGRAM(s) SubCutaneous every 12 hours  escitalopram 10 milliGRAM(s) Oral daily  guaiFENesin  milliGRAM(s) Oral every 12 hours  linaclotide 72 MICROGram(s) Oral before breakfast  metoprolol succinate ER 25 milliGRAM(s) Oral daily  mirabegron ER 25 milliGRAM(s) Oral daily  rosuvastatin 10 milliGRAM(s) Oral at bedtime  sodium chloride 3%  Inhalation 4 milliLiter(s) Inhalation every 6 hours  spironolactone 25 milliGRAM(s) Oral daily      PAST MEDICAL & SURGICAL HISTORY:  Chronic diastolic congestive heart failure  CAD (coronary artery disease)  No significant past surgical history    FAMILY HISTORY:  No pertinent family history in first degree relatives    SOCIAL HISTORY:  unchanged    REVIEW OF SYSTEMS:  CONSTITUTIONAL: No fever  EYES: No eye pain  ENT:  No throat pain  NECK: No pain   RESPIRATORY: SOB on admission    CARDIOVASCULAR:  No C/P  GASTROINTESTINAL: No abdominal pain  GENITOURINARY: No hematuria  SKIN: No LE wounds  LYMPH Nodes: No enlarged glands noted  ENDOCRINE: No heat or cold intolerance noted  MUSCULOSKELETAL:  LE edema  PSYCHIATRIC: No depression, anxiety  HEME/LYMPH: No bleeding gums  ALLERGY AND IMMUNOLOGIC: No hives    [ x] All others negative	     ICU Vital Signs Last 24 Hrs  T(C): 37 (12 Jun 2025 16:19), Max: 37.1 (11 Jun 2025 19:47)  T(F): 98.6 (12 Jun 2025 16:19), Max: 98.7 (11 Jun 2025 19:47)  HR: 69 (12 Jun 2025 16:19) (69 - 86)  BP: 109/68 (12 Jun 2025 16:19) (103/70 - 138/80)  BP(mean): --  ABP: --  ABP(mean): --  RR: 18 (12 Jun 2025 16:19) (18 - 18)  SpO2: 91% (12 Jun 2025 16:19) (91% - 97%)    O2 Parameters below as of 12 Jun 2025 16:19  Patient On (Oxygen Delivery Method): nasal cannula  O2 Flow (L/min): 2          Appearance: NAD  Cardiovascular: RRR, S1 and S2  Respiratory: Decreased BS Bases  Gastrointestinal:  Soft, Non-tender, + BS	  Skin: No cyanosis	  Extremities: Mild LE edema, bilateral calves soft   Foot Exam: No tissue loss       06-12    135  |  93[L]  |  34[H]  ----------------------------<  123[H]  3.6   |  24  |  1.50[H]    Ca    9.5      12 Jun 2025 07:16          Assessment:  1. Bilateral PE  Lobar and Segmental PE  2. R LE DVT  R popliteal DVT  3. HFpEF  4. Cough  5. CAD s/p PCI  6. HTN  7. History of Falls  8. EF 46%  9. SDH  10. LV thrombus    Plan:  1. Continue Lovenox 60 mg BID  (70% of therapeutic dose)   Dose modification recommended in setting of SDH. Also paired with surveillance (TTE and LE venous duplex).  2. Appreciate Neurosurgical evaluation.  3. Repeat CT head    4.  Creatinine mild bump, would stop torsemide for now and use it PRN for leg swelling.  His edema is much improved.  5. d/w daughter    Tony

## 2025-06-13 LAB
ANION GAP SERPL CALC-SCNC: 19 MMOL/L — HIGH (ref 5–17)
BUN SERPL-MCNC: 33 MG/DL — HIGH (ref 7–23)
CALCIUM SERPL-MCNC: 9.2 MG/DL — SIGNIFICANT CHANGE UP (ref 8.4–10.5)
CHLORIDE SERPL-SCNC: 90 MMOL/L — LOW (ref 96–108)
CO2 SERPL-SCNC: 24 MMOL/L — SIGNIFICANT CHANGE UP (ref 22–31)
CREAT SERPL-MCNC: 1.35 MG/DL — HIGH (ref 0.5–1.3)
EGFR: 52 ML/MIN/1.73M2 — LOW
EGFR: 52 ML/MIN/1.73M2 — LOW
GLUCOSE SERPL-MCNC: 113 MG/DL — HIGH (ref 70–99)
HCT VFR BLD CALC: 40.6 % — SIGNIFICANT CHANGE UP (ref 39–50)
HGB BLD-MCNC: 13.9 G/DL — SIGNIFICANT CHANGE UP (ref 13–17)
MCHC RBC-ENTMCNC: 32.6 PG — SIGNIFICANT CHANGE UP (ref 27–34)
MCHC RBC-ENTMCNC: 34.2 G/DL — SIGNIFICANT CHANGE UP (ref 32–36)
MCV RBC AUTO: 95.1 FL — SIGNIFICANT CHANGE UP (ref 80–100)
NRBC BLD AUTO-RTO: 0 /100 WBCS — SIGNIFICANT CHANGE UP (ref 0–0)
PLATELET # BLD AUTO: 296 K/UL — SIGNIFICANT CHANGE UP (ref 150–400)
POTASSIUM SERPL-MCNC: 3.5 MMOL/L — SIGNIFICANT CHANGE UP (ref 3.5–5.3)
POTASSIUM SERPL-SCNC: 3.5 MMOL/L — SIGNIFICANT CHANGE UP (ref 3.5–5.3)
RBC # BLD: 4.27 M/UL — SIGNIFICANT CHANGE UP (ref 4.2–5.8)
RBC # FLD: 12.8 % — SIGNIFICANT CHANGE UP (ref 10.3–14.5)
SODIUM SERPL-SCNC: 133 MMOL/L — LOW (ref 135–145)
WBC # BLD: 6.6 K/UL — SIGNIFICANT CHANGE UP (ref 3.8–10.5)
WBC # FLD AUTO: 6.6 K/UL — SIGNIFICANT CHANGE UP (ref 3.8–10.5)

## 2025-06-13 PROCEDURE — 99233 SBSQ HOSP IP/OBS HIGH 50: CPT

## 2025-06-13 RX ORDER — MIRABEGRON 50 MG/1
1 TABLET, FILM COATED, EXTENDED RELEASE ORAL
Refills: 0 | DISCHARGE

## 2025-06-13 RX ORDER — METOPROLOL SUCCINATE 50 MG/1
1 TABLET, EXTENDED RELEASE ORAL
Refills: 0 | DISCHARGE

## 2025-06-13 RX ORDER — ROSUVASTATIN CALCIUM 20 MG/1
1 TABLET, FILM COATED ORAL
Refills: 0 | DISCHARGE

## 2025-06-13 RX ORDER — ESCITALOPRAM OXALATE 20 MG/1
1 TABLET ORAL
Refills: 0 | DISCHARGE

## 2025-06-13 RX ORDER — TORSEMIDE 10 MG
1 TABLET ORAL
Refills: 0 | DISCHARGE

## 2025-06-13 RX ORDER — SPIRONOLACTONE 25 MG
1 TABLET ORAL
Refills: 0 | DISCHARGE

## 2025-06-13 RX ORDER — LINACLOTIDE 290 UG/1
1 CAPSULE, GELATIN COATED ORAL
Refills: 0 | DISCHARGE

## 2025-06-13 RX ADMIN — ROSUVASTATIN CALCIUM 10 MILLIGRAM(S): 20 TABLET, FILM COATED ORAL at 22:33

## 2025-06-13 RX ADMIN — IPRATROPIUM BROMIDE AND ALBUTEROL SULFATE 3 MILLILITER(S): .5; 2.5 SOLUTION RESPIRATORY (INHALATION) at 05:25

## 2025-06-13 RX ADMIN — ENOXAPARIN SODIUM 60 MILLIGRAM(S): 100 INJECTION SUBCUTANEOUS at 05:25

## 2025-06-13 RX ADMIN — Medication 4 MILLILITER(S): at 11:13

## 2025-06-13 RX ADMIN — MIRABEGRON 25 MILLIGRAM(S): 50 TABLET, FILM COATED, EXTENDED RELEASE ORAL at 11:11

## 2025-06-13 RX ADMIN — ESCITALOPRAM OXALATE 10 MILLIGRAM(S): 20 TABLET ORAL at 11:10

## 2025-06-13 RX ADMIN — Medication 4 MILLILITER(S): at 16:51

## 2025-06-13 RX ADMIN — IPRATROPIUM BROMIDE AND ALBUTEROL SULFATE 3 MILLILITER(S): .5; 2.5 SOLUTION RESPIRATORY (INHALATION) at 11:11

## 2025-06-13 RX ADMIN — Medication 4 MILLILITER(S): at 05:25

## 2025-06-13 RX ADMIN — IPRATROPIUM BROMIDE AND ALBUTEROL SULFATE 3 MILLILITER(S): .5; 2.5 SOLUTION RESPIRATORY (INHALATION) at 16:51

## 2025-06-13 RX ADMIN — Medication 100 MILLIGRAM(S): at 11:10

## 2025-06-13 RX ADMIN — ACETYLCYSTEINE 4 MILLILITER(S): 200 INHALANT RESPIRATORY (INHALATION) at 03:37

## 2025-06-13 RX ADMIN — DEXTROMETHORPHAN HBR, GUAIFENESIN 100 MILLIGRAM(S): 200 LIQUID ORAL at 03:38

## 2025-06-13 RX ADMIN — Medication 25 MILLIGRAM(S): at 05:25

## 2025-06-13 RX ADMIN — METOPROLOL SUCCINATE 25 MILLIGRAM(S): 50 TABLET, EXTENDED RELEASE ORAL at 05:25

## 2025-06-13 RX ADMIN — DEXTROMETHORPHAN HBR, GUAIFENESIN 600 MILLIGRAM(S): 200 LIQUID ORAL at 05:25

## 2025-06-13 RX ADMIN — LINACLOTIDE 72 MICROGRAM(S): 290 CAPSULE, GELATIN COATED ORAL at 05:26

## 2025-06-13 RX ADMIN — DEXTROMETHORPHAN HBR, GUAIFENESIN 600 MILLIGRAM(S): 200 LIQUID ORAL at 16:52

## 2025-06-13 RX ADMIN — ENOXAPARIN SODIUM 60 MILLIGRAM(S): 100 INJECTION SUBCUTANEOUS at 16:53

## 2025-06-13 NOTE — PROGRESS NOTE ADULT - SUBJECTIVE AND OBJECTIVE BOX
Vascular Cardiology Progress Note     DIRECT PROVIDER NUMBER: [phone]  / Available on TEAMS    CC:  weakness and DVT    Interval Events:  Reports breathing improved.  Reduced cough.   Remains on Lovenox 60 mg BID.   Repeat CT head on 6/12 read as decreased SDH.     MEDICATIONS  (STANDING):  albuterol/ipratropium for Nebulization 3 milliLiter(s) Nebulizer every 6 hours  allopurinol 100 milliGRAM(s) Oral daily  enoxaparin Injectable 60 milliGRAM(s) SubCutaneous every 12 hours  escitalopram 10 milliGRAM(s) Oral daily  guaiFENesin  milliGRAM(s) Oral every 12 hours  linaclotide 72 MICROGram(s) Oral before breakfast  metoprolol succinate ER 25 milliGRAM(s) Oral daily  mirabegron ER 25 milliGRAM(s) Oral daily  rosuvastatin 10 milliGRAM(s) Oral at bedtime  sodium chloride 3%  Inhalation 4 milliLiter(s) Inhalation every 6 hours  spironolactone 25 milliGRAM(s) Oral daily    PAST MEDICAL & SURGICAL HISTORY:  Chronic diastolic congestive heart failure  CAD (coronary artery disease)  No significant past surgical history    FAMILY HISTORY:  No pertinent family history in first degree relatives    SOCIAL HISTORY:  unchanged    REVIEW OF SYSTEMS:  CONSTITUTIONAL: No fever  EYES: No eye pain  ENT:  No throat pain  NECK: No pain   RESPIRATORY: SOB on admission    CARDIOVASCULAR:  No C/P  GASTROINTESTINAL: No abdominal pain  GENITOURINARY: No hematuria  SKIN: No LE wounds  LYMPH Nodes: No enlarged glands noted  ENDOCRINE: No heat or cold intolerance noted  MUSCULOSKELETAL:  Decreased LE edema  PSYCHIATRIC: No depression, anxiety  HEME/LYMPH: No bleeding gums  ALLERGY AND IMMUNOLOGIC: No hives    [ x] All others negative	    Vital Signs Last 24 Hrs  T(C): 36.6 (13 Jun 2025 12:16), Max: 37 (12 Jun 2025 16:19)  T(F): 97.9 (13 Jun 2025 12:16), Max: 98.6 (12 Jun 2025 16:19)  HR: 72 (13 Jun 2025 12:16) (69 - 82)  BP: 104/78 (13 Jun 2025 12:16) (104/71 - 120/78)  BP(mean): --  RR: 18 (13 Jun 2025 12:16) (18 - 18)  SpO2: 91% (13 Jun 2025 12:16) (91% - 97%)    Parameters below as of 13 Jun 2025 12:16  Patient On (Oxygen Delivery Method): nasal cannula  O2 Flow (L/min): 2      Appearance: NAD  Cardiovascular: RRR, S1 and S2  Respiratory: Decreased BS Bases  Gastrointestinal:  Soft, Non-tender, + BS	  Skin: No cyanosis	  Extremities: Decrased LE edema, bilateral calves soft   Foot Exam: No tissue loss               Labs:                        13.9   6.60  )-----------( 296      ( 13 Jun 2025 07:04 )             40.6     06-13    133[L]  |  90[L]  |  33[H]  ----------------------------<  113[H]  3.5   |  24  |  1.35[H]    Ca    9.2      13 Jun 2025 07:04    Urinalysis Basic - ( 13 Jun 2025 07:04 )    Color: x / Appearance: x / SG: x / pH: x  Gluc: 113 mg/dL / Ketone: x  / Bili: x / Urobili: x   Blood: x / Protein: x / Nitrite: x   Leuk Esterase: x / RBC: x / WBC x   Sq Epi: x / Non Sq Epi: x / Bacteria: x    Assessment:  1. Bilateral PE  Lobar and Segmental PE  2. R LE DVT  R popliteal DVT  3. HFpEF  4. Cough  5. CAD s/p PCI  6. HTN  7. History of Falls  8. EF 46%  9. SDH  10. LV thrombus    Plan:  1. Transitioned to Lovenox 60 mg BID  (70% of therapeutic dose) on 6/10. Recommend to continue.   Dose modification recommended in setting of SDH. Also paired with surveillance (TTE and LE venous duplex).  2. Appreciate Neurosurgical evaluation.  3. Repeat CT head on 6/6/12 for surveillance of SDH, read as smaller and with no new hemorrhage.  4. LE venous duplex on 6/6 for surveillance showed persistent thrombosis of the right popliteal vein without propagation.  5. Recommend repeat TTE on 6/16 for surveillance of LV thrombus.   6. Recommend repeat LE venous duplex on 6/16 for surveillance of DVT.   7. Creatinine improved today. Volume status stable.   8. Recommend to hold diuretic today. And resume Torsemide at 20 mg daily tomorrow.   9. Pneumatic compression to L lower extremity.  10. Appreciate Pulmonary and Medicine.     Thank you  STEVEN Bryant, MS, [facility]  Vascular Cardiology Service    Please call with any questions:   DIRECT SERVICE NUMBER: [phone] / Available on TEAMS

## 2025-06-13 NOTE — PROGRESS NOTE ADULT - SUBJECTIVE AND OBJECTIVE BOX
Sharmila Ruffin DO  Division of [facility] Medicine  Available on MS Teams    SUBJECTIVE / OVERNIGHT EVENTS:  Resting comfortably in bed on examination. Cough improving, overall doing better.     MEDICATIONS  (STANDING):  albuterol/ipratropium for Nebulization 3 milliLiter(s) Nebulizer every 6 hours  allopurinol 100 milliGRAM(s) Oral daily  enoxaparin Injectable 60 milliGRAM(s) SubCutaneous every 12 hours  escitalopram 10 milliGRAM(s) Oral daily  guaiFENesin  milliGRAM(s) Oral every 12 hours  linaclotide 72 MICROGram(s) Oral before breakfast  metoprolol succinate ER 25 milliGRAM(s) Oral daily  mirabegron ER 25 milliGRAM(s) Oral daily  rosuvastatin 10 milliGRAM(s) Oral at bedtime  sodium chloride 3%  Inhalation 4 milliLiter(s) Inhalation every 6 hours  spironolactone 25 milliGRAM(s) Oral daily    MEDICATIONS  (PRN):  acetylcysteine 10%  Inhalation 4 milliLiter(s) Inhalation every 4 hours PRN increased secretions  guaiFENesin Oral Liquid (Sugar-Free) 100 milliGRAM(s) Oral every 6 hours PRN Cough  sodium chloride 0.65% Nasal 1 Spray(s) Both Nostrils three times a day PRN Nasal Congestion      I&O's Summary    12 Jun 2025 07:01  -  13 Jun 2025 07:00  --------------------------------------------------------  IN: 180 mL / OUT: 100 mL / NET: 80 mL    13 Jun 2025 07:01  -  13 Jun 2025 15:00  --------------------------------------------------------  IN: 0 mL / OUT: 450 mL / NET: -450 mL          PHYSICAL EXAM:  Vital Signs Last 24 Hrs  T(C): 36.6 (13 Jun 2025 12:16), Max: 37 (12 Jun 2025 16:19)  T(F): 97.9 (13 Jun 2025 12:16), Max: 98.6 (12 Jun 2025 16:19)  HR: 72 (13 Jun 2025 12:16) (69 - 82)  BP: 104/78 (13 Jun 2025 12:16) (104/71 - 120/78)  BP(mean): --  RR: 18 (13 Jun 2025 12:16) (18 - 18)  SpO2: 91% (13 Jun 2025 12:16) (91% - 97%)    Parameters below as of 13 Jun 2025 12:16  Patient On (Oxygen Delivery Method): nasal cannula  O2 Flow (L/min): 2      CONSTITUTIONAL: NAD  EYES: EOMI; conjunctiva and sclera clear  ENMT: Moist oral mucosa  RESPIRATORY: no accessory muscle use, no wheezing, regular respiratory rate  CARDIOVASCULAR: RRR, no murmurs, no LE edema  ABDOMEN: Nontender to palpation, mild distention, no rebound/guarding  PSYCH: A+O x3  NEUROLOGY: mild R facial droop (baseline), no new FND    LABS:                         13.9   6.60  )-----------( 296      ( 13 Jun 2025 07:04 )             40.6     06-13    133[L]  |  90[L]  |  33[H]  ----------------------------<  113[H]  3.5   |  24  |  1.35[H]    Ca    9.2      13 Jun 2025 07:04        Urinalysis Basic - ( 13 Jun 2025 07:04 )    Color: x / Appearance: x / SG: x / pH: x  Gluc: 113 mg/dL / Ketone: x  / Bili: x / Urobili: x   Blood: x / Protein: x / Nitrite: x   Leuk Esterase: x / RBC: x / WBC x   Sq Epi: x / Non Sq Epi: x / Bacteria: x        Labs and imaging reviewed

## 2025-06-14 LAB
ANION GAP SERPL CALC-SCNC: 14 MMOL/L — SIGNIFICANT CHANGE UP (ref 5–17)
BUN SERPL-MCNC: 25 MG/DL — HIGH (ref 7–23)
CALCIUM SERPL-MCNC: 9.5 MG/DL — SIGNIFICANT CHANGE UP (ref 8.4–10.5)
CHLORIDE SERPL-SCNC: 96 MMOL/L — SIGNIFICANT CHANGE UP (ref 96–108)
CO2 SERPL-SCNC: 25 MMOL/L — SIGNIFICANT CHANGE UP (ref 22–31)
CREAT SERPL-MCNC: 1.21 MG/DL — SIGNIFICANT CHANGE UP (ref 0.5–1.3)
EGFR: 60 ML/MIN/1.73M2 — SIGNIFICANT CHANGE UP
EGFR: 60 ML/MIN/1.73M2 — SIGNIFICANT CHANGE UP
GLUCOSE SERPL-MCNC: 109 MG/DL — HIGH (ref 70–99)
HCT VFR BLD CALC: 40.7 % — SIGNIFICANT CHANGE UP (ref 39–50)
HGB BLD-MCNC: 13.9 G/DL — SIGNIFICANT CHANGE UP (ref 13–17)
MCHC RBC-ENTMCNC: 32.3 PG — SIGNIFICANT CHANGE UP (ref 27–34)
MCHC RBC-ENTMCNC: 34.2 G/DL — SIGNIFICANT CHANGE UP (ref 32–36)
MCV RBC AUTO: 94.7 FL — SIGNIFICANT CHANGE UP (ref 80–100)
NRBC BLD AUTO-RTO: 0 /100 WBCS — SIGNIFICANT CHANGE UP (ref 0–0)
PLATELET # BLD AUTO: 296 K/UL — SIGNIFICANT CHANGE UP (ref 150–400)
POTASSIUM SERPL-MCNC: 3.6 MMOL/L — SIGNIFICANT CHANGE UP (ref 3.5–5.3)
POTASSIUM SERPL-SCNC: 3.6 MMOL/L — SIGNIFICANT CHANGE UP (ref 3.5–5.3)
RBC # BLD: 4.3 M/UL — SIGNIFICANT CHANGE UP (ref 4.2–5.8)
RBC # FLD: 12.8 % — SIGNIFICANT CHANGE UP (ref 10.3–14.5)
SODIUM SERPL-SCNC: 135 MMOL/L — SIGNIFICANT CHANGE UP (ref 135–145)
WBC # BLD: 6.51 K/UL — SIGNIFICANT CHANGE UP (ref 3.8–10.5)
WBC # FLD AUTO: 6.51 K/UL — SIGNIFICANT CHANGE UP (ref 3.8–10.5)

## 2025-06-14 PROCEDURE — 99233 SBSQ HOSP IP/OBS HIGH 50: CPT

## 2025-06-14 RX ORDER — TORSEMIDE 10 MG
20 TABLET ORAL DAILY
Refills: 0 | Status: DISCONTINUED | OUTPATIENT
Start: 2025-06-14 | End: 2025-06-19

## 2025-06-14 RX ADMIN — IPRATROPIUM BROMIDE AND ALBUTEROL SULFATE 3 MILLILITER(S): .5; 2.5 SOLUTION RESPIRATORY (INHALATION) at 00:03

## 2025-06-14 RX ADMIN — IPRATROPIUM BROMIDE AND ALBUTEROL SULFATE 3 MILLILITER(S): .5; 2.5 SOLUTION RESPIRATORY (INHALATION) at 17:09

## 2025-06-14 RX ADMIN — ENOXAPARIN SODIUM 60 MILLIGRAM(S): 100 INJECTION SUBCUTANEOUS at 06:28

## 2025-06-14 RX ADMIN — METOPROLOL SUCCINATE 25 MILLIGRAM(S): 50 TABLET, EXTENDED RELEASE ORAL at 06:29

## 2025-06-14 RX ADMIN — Medication 100 MILLIGRAM(S): at 11:35

## 2025-06-14 RX ADMIN — MIRABEGRON 25 MILLIGRAM(S): 50 TABLET, FILM COATED, EXTENDED RELEASE ORAL at 11:35

## 2025-06-14 RX ADMIN — ESCITALOPRAM OXALATE 10 MILLIGRAM(S): 20 TABLET ORAL at 11:35

## 2025-06-14 RX ADMIN — Medication 4 MILLILITER(S): at 00:03

## 2025-06-14 RX ADMIN — Medication 4 MILLILITER(S): at 06:28

## 2025-06-14 RX ADMIN — Medication 4 MILLILITER(S): at 11:36

## 2025-06-14 RX ADMIN — IPRATROPIUM BROMIDE AND ALBUTEROL SULFATE 3 MILLILITER(S): .5; 2.5 SOLUTION RESPIRATORY (INHALATION) at 11:37

## 2025-06-14 RX ADMIN — Medication 25 MILLIGRAM(S): at 06:29

## 2025-06-14 RX ADMIN — DEXTROMETHORPHAN HBR, GUAIFENESIN 600 MILLIGRAM(S): 200 LIQUID ORAL at 17:08

## 2025-06-14 RX ADMIN — DEXTROMETHORPHAN HBR, GUAIFENESIN 600 MILLIGRAM(S): 200 LIQUID ORAL at 06:29

## 2025-06-14 RX ADMIN — Medication 4 MILLILITER(S): at 17:09

## 2025-06-14 RX ADMIN — IPRATROPIUM BROMIDE AND ALBUTEROL SULFATE 3 MILLILITER(S): .5; 2.5 SOLUTION RESPIRATORY (INHALATION) at 06:28

## 2025-06-14 RX ADMIN — LINACLOTIDE 72 MICROGRAM(S): 290 CAPSULE, GELATIN COATED ORAL at 06:29

## 2025-06-14 RX ADMIN — ROSUVASTATIN CALCIUM 10 MILLIGRAM(S): 20 TABLET, FILM COATED ORAL at 22:06

## 2025-06-14 RX ADMIN — ENOXAPARIN SODIUM 60 MILLIGRAM(S): 100 INJECTION SUBCUTANEOUS at 17:08

## 2025-06-14 NOTE — PROGRESS NOTE ADULT - PROBLEM SELECTOR PLAN 6
TTE 7/2024: moderately reduced EF, G1DD. Has struggled with chronic LE edema, unclear how much is 2/2 to HF vs Chronic venous insufficieny (per outpatient notes appears more likely the later)   -Restart torsemide 20 mg qd  -continue spironolactone 25mg daily  -continue metop 25mg daily  -previously on ace, off currently  - f/u cardiology recs

## 2025-06-14 NOTE — PROGRESS NOTE ADULT - SUBJECTIVE AND OBJECTIVE BOX
Sharmila Ruffin DO  Division of Hospital Medicine  Available on MS Teams    SUBJECTIVE / OVERNIGHT EVENTS:  Cough continues to improve, still requiring nasal cannula. No complaints at this time.     MEDICATIONS  (STANDING):  albuterol/ipratropium for Nebulization 3 milliLiter(s) Nebulizer every 6 hours  allopurinol 100 milliGRAM(s) Oral daily  enoxaparin Injectable 60 milliGRAM(s) SubCutaneous every 12 hours  escitalopram 10 milliGRAM(s) Oral daily  guaiFENesin  milliGRAM(s) Oral every 12 hours  linaclotide 72 MICROGram(s) Oral before breakfast  metoprolol succinate ER 25 milliGRAM(s) Oral daily  mirabegron ER 25 milliGRAM(s) Oral daily  rosuvastatin 10 milliGRAM(s) Oral at bedtime  sodium chloride 3%  Inhalation 4 milliLiter(s) Inhalation every 6 hours  spironolactone 25 milliGRAM(s) Oral daily    MEDICATIONS  (PRN):  acetylcysteine 10%  Inhalation 4 milliLiter(s) Inhalation every 4 hours PRN increased secretions  guaiFENesin Oral Liquid (Sugar-Free) 100 milliGRAM(s) Oral every 6 hours PRN Cough  sodium chloride 0.65% Nasal 1 Spray(s) Both Nostrils three times a day PRN Nasal Congestion      I&O's Summary    13 Jun 2025 07:01  -  14 Jun 2025 07:00  --------------------------------------------------------  IN: 0 mL / OUT: 1300 mL / NET: -1300 mL        PHYSICAL EXAM:  Vital Signs Last 24 Hrs  T(C): 36.4 (14 Jun 2025 12:16), Max: 36.8 (14 Jun 2025 00:32)  T(F): 97.6 (14 Jun 2025 12:16), Max: 98.3 (14 Jun 2025 00:32)  HR: 69 (14 Jun 2025 12:16) (69 - 76)  BP: 108/69 (14 Jun 2025 12:16) (108/69 - 138/73)  BP(mean): --  RR: 18 (14 Jun 2025 12:16) (17 - 18)  SpO2: 91% (14 Jun 2025 12:16) (91% - 93%)    Parameters below as of 14 Jun 2025 12:16  Patient On (Oxygen Delivery Method): nasal cannula  O2 Flow (L/min): 2      CONSTITUTIONAL: NAD  EYES: EOMI; conjunctiva and sclera clear  ENMT: Moist oral mucosa, nasal cannula  RESPIRATORY: no accessory muscle use, no wheezing, regular respiratory rate  CARDIOVASCULAR: RRR, no murmurs, no LE edema  ABDOMEN: Nontender to palpation, mild distention, no rebound/guarding  PSYCH: A+O x3  NEUROLOGY: mild R facial droop (baseline), no new FND      LABS:                         13.9   6.51  )-----------( 296      ( 14 Jun 2025 07:14 )             40.7     06-14    135  |  96  |  25[H]  ----------------------------<  109[H]  3.6   |  25  |  1.21    Ca    9.5      14 Jun 2025 07:14        Urinalysis Basic - ( 14 Jun 2025 07:14 )    Color: x / Appearance: x / SG: x / pH: x  Gluc: 109 mg/dL / Ketone: x  / Bili: x / Urobili: x   Blood: x / Protein: x / Nitrite: x   Leuk Esterase: x / RBC: x / WBC x   Sq Epi: x / Non Sq Epi: x / Bacteria:       Labs  reviewed

## 2025-06-15 LAB
ANION GAP SERPL CALC-SCNC: 15 MMOL/L — SIGNIFICANT CHANGE UP (ref 5–17)
BASOPHILS # BLD AUTO: 0.05 K/UL — SIGNIFICANT CHANGE UP (ref 0–0.2)
BASOPHILS NFR BLD AUTO: 0.6 % — SIGNIFICANT CHANGE UP (ref 0–2)
BUN SERPL-MCNC: 25 MG/DL — HIGH (ref 7–23)
CALCIUM SERPL-MCNC: 9.6 MG/DL — SIGNIFICANT CHANGE UP (ref 8.4–10.5)
CHLORIDE SERPL-SCNC: 95 MMOL/L — LOW (ref 96–108)
CO2 SERPL-SCNC: 24 MMOL/L — SIGNIFICANT CHANGE UP (ref 22–31)
CREAT SERPL-MCNC: 1.32 MG/DL — HIGH (ref 0.5–1.3)
EGFR: 54 ML/MIN/1.73M2 — LOW
EGFR: 54 ML/MIN/1.73M2 — LOW
EOSINOPHIL # BLD AUTO: 0.12 K/UL — SIGNIFICANT CHANGE UP (ref 0–0.5)
EOSINOPHIL NFR BLD AUTO: 1.5 % — SIGNIFICANT CHANGE UP (ref 0–6)
GLUCOSE SERPL-MCNC: 133 MG/DL — HIGH (ref 70–99)
HCT VFR BLD CALC: 42.7 % — SIGNIFICANT CHANGE UP (ref 39–50)
HGB BLD-MCNC: 14.9 G/DL — SIGNIFICANT CHANGE UP (ref 13–17)
IMM GRANULOCYTES NFR BLD AUTO: 0.8 % — SIGNIFICANT CHANGE UP (ref 0–0.9)
LYMPHOCYTES # BLD AUTO: 1.58 K/UL — SIGNIFICANT CHANGE UP (ref 1–3.3)
LYMPHOCYTES # BLD AUTO: 19.8 % — SIGNIFICANT CHANGE UP (ref 13–44)
MCHC RBC-ENTMCNC: 32.7 PG — SIGNIFICANT CHANGE UP (ref 27–34)
MCHC RBC-ENTMCNC: 34.9 G/DL — SIGNIFICANT CHANGE UP (ref 32–36)
MCV RBC AUTO: 93.6 FL — SIGNIFICANT CHANGE UP (ref 80–100)
MONOCYTES # BLD AUTO: 0.96 K/UL — HIGH (ref 0–0.9)
MONOCYTES NFR BLD AUTO: 12 % — SIGNIFICANT CHANGE UP (ref 2–14)
NEUTROPHILS # BLD AUTO: 5.23 K/UL — SIGNIFICANT CHANGE UP (ref 1.8–7.4)
NEUTROPHILS NFR BLD AUTO: 65.3 % — SIGNIFICANT CHANGE UP (ref 43–77)
NRBC BLD AUTO-RTO: 0 /100 WBCS — SIGNIFICANT CHANGE UP (ref 0–0)
PLATELET # BLD AUTO: 315 K/UL — SIGNIFICANT CHANGE UP (ref 150–400)
POTASSIUM SERPL-MCNC: 3.6 MMOL/L — SIGNIFICANT CHANGE UP (ref 3.5–5.3)
POTASSIUM SERPL-SCNC: 3.6 MMOL/L — SIGNIFICANT CHANGE UP (ref 3.5–5.3)
RBC # BLD: 4.56 M/UL — SIGNIFICANT CHANGE UP (ref 4.2–5.8)
RBC # FLD: 12.7 % — SIGNIFICANT CHANGE UP (ref 10.3–14.5)
SODIUM SERPL-SCNC: 134 MMOL/L — LOW (ref 135–145)
WBC # BLD: 8 K/UL — SIGNIFICANT CHANGE UP (ref 3.8–10.5)
WBC # FLD AUTO: 8 K/UL — SIGNIFICANT CHANGE UP (ref 3.8–10.5)

## 2025-06-15 PROCEDURE — 99232 SBSQ HOSP IP/OBS MODERATE 35: CPT

## 2025-06-15 PROCEDURE — 93970 EXTREMITY STUDY: CPT | Mod: 26

## 2025-06-15 RX ADMIN — METOPROLOL SUCCINATE 25 MILLIGRAM(S): 50 TABLET, EXTENDED RELEASE ORAL at 07:11

## 2025-06-15 RX ADMIN — Medication 4 MILLILITER(S): at 11:48

## 2025-06-15 RX ADMIN — Medication 4 MILLILITER(S): at 23:50

## 2025-06-15 RX ADMIN — Medication 20 MILLIGRAM(S): at 07:10

## 2025-06-15 RX ADMIN — Medication 4 MILLILITER(S): at 07:11

## 2025-06-15 RX ADMIN — LINACLOTIDE 72 MICROGRAM(S): 290 CAPSULE, GELATIN COATED ORAL at 07:11

## 2025-06-15 RX ADMIN — DEXTROMETHORPHAN HBR, GUAIFENESIN 600 MILLIGRAM(S): 200 LIQUID ORAL at 07:11

## 2025-06-15 RX ADMIN — IPRATROPIUM BROMIDE AND ALBUTEROL SULFATE 3 MILLILITER(S): .5; 2.5 SOLUTION RESPIRATORY (INHALATION) at 00:15

## 2025-06-15 RX ADMIN — Medication 100 MILLIGRAM(S): at 11:48

## 2025-06-15 RX ADMIN — IPRATROPIUM BROMIDE AND ALBUTEROL SULFATE 3 MILLILITER(S): .5; 2.5 SOLUTION RESPIRATORY (INHALATION) at 17:45

## 2025-06-15 RX ADMIN — ESCITALOPRAM OXALATE 10 MILLIGRAM(S): 20 TABLET ORAL at 11:48

## 2025-06-15 RX ADMIN — ENOXAPARIN SODIUM 60 MILLIGRAM(S): 100 INJECTION SUBCUTANEOUS at 17:45

## 2025-06-15 RX ADMIN — Medication 25 MILLIGRAM(S): at 07:11

## 2025-06-15 RX ADMIN — IPRATROPIUM BROMIDE AND ALBUTEROL SULFATE 3 MILLILITER(S): .5; 2.5 SOLUTION RESPIRATORY (INHALATION) at 11:49

## 2025-06-15 RX ADMIN — Medication 4 MILLILITER(S): at 00:15

## 2025-06-15 RX ADMIN — ROSUVASTATIN CALCIUM 10 MILLIGRAM(S): 20 TABLET, FILM COATED ORAL at 21:37

## 2025-06-15 RX ADMIN — IPRATROPIUM BROMIDE AND ALBUTEROL SULFATE 3 MILLILITER(S): .5; 2.5 SOLUTION RESPIRATORY (INHALATION) at 07:11

## 2025-06-15 RX ADMIN — Medication 4 MILLILITER(S): at 17:46

## 2025-06-15 RX ADMIN — ENOXAPARIN SODIUM 60 MILLIGRAM(S): 100 INJECTION SUBCUTANEOUS at 07:11

## 2025-06-15 RX ADMIN — MIRABEGRON 25 MILLIGRAM(S): 50 TABLET, FILM COATED, EXTENDED RELEASE ORAL at 11:48

## 2025-06-15 RX ADMIN — DEXTROMETHORPHAN HBR, GUAIFENESIN 600 MILLIGRAM(S): 200 LIQUID ORAL at 17:46

## 2025-06-15 RX ADMIN — IPRATROPIUM BROMIDE AND ALBUTEROL SULFATE 3 MILLILITER(S): .5; 2.5 SOLUTION RESPIRATORY (INHALATION) at 23:50

## 2025-06-15 NOTE — PROGRESS NOTE ADULT - SUBJECTIVE AND OBJECTIVE BOX
Sharmila Ruffin DO  Division of Hospital Medicine  Available on MS Teams    SUBJECTIVE / OVERNIGHT EVENTS:  Cough continues to improve, still requiring 2L nasal cannula. No complaints at this time.     MEDICATIONS  (STANDING):  albuterol/ipratropium for Nebulization 3 milliLiter(s) Nebulizer every 6 hours  allopurinol 100 milliGRAM(s) Oral daily  enoxaparin Injectable 60 milliGRAM(s) SubCutaneous every 12 hours  escitalopram 10 milliGRAM(s) Oral daily  guaiFENesin  milliGRAM(s) Oral every 12 hours  linaclotide 72 MICROGram(s) Oral before breakfast  metoprolol succinate ER 25 milliGRAM(s) Oral daily  mirabegron ER 25 milliGRAM(s) Oral daily  rosuvastatin 10 milliGRAM(s) Oral at bedtime  sodium chloride 3%  Inhalation 4 milliLiter(s) Inhalation every 6 hours  spironolactone 25 milliGRAM(s) Oral daily    MEDICATIONS  (PRN):  acetylcysteine 10%  Inhalation 4 milliLiter(s) Inhalation every 4 hours PRN increased secretions  guaiFENesin Oral Liquid (Sugar-Free) 100 milliGRAM(s) Oral every 6 hours PRN Cough  sodium chloride 0.65% Nasal 1 Spray(s) Both Nostrils three times a day PRN Nasal Congestion      I&O's Summary    14 Jun 2025 07:01  -  15 Jean 2025 07:00  --------------------------------------------------------  IN: 0 mL / OUT: 600 mL / NET: -600 mL          PHYSICAL EXAM:  Vital Signs Last 24 Hrs  T(C): 36.6 (15 Jean 2025 09:00), Max: 36.7 (15 Jean 2025 00:13)  T(F): 97.8 (15 Jean 2025 09:00), Max: 98.1 (15 Jean 2025 00:13)  HR: 84 (15 Jean 2025 09:00) (55 - 84)  BP: 132/74 (15 Jean 2025 09:00) (100/63 - 132/74)  BP(mean): --  RR: 16 (15 Jean 2025 09:00) (16 - 18)  SpO2: 92% (15 Jean 2025 09:00) (92% - 93%)    Parameters below as of 15 Jean 2025 09:00  Patient On (Oxygen Delivery Method): nasal cannula  O2 Flow (L/min): 2        CONSTITUTIONAL: NAD  EYES: EOMI; conjunctiva and sclera clear  ENMT: Moist oral mucosa, nasal cannula  RESPIRATORY: no accessory muscle use, no wheezing, regular respiratory rate  CARDIOVASCULAR: RRR, no murmurs, no LE edema  ABDOMEN: Nontender to palpation, mild distention, no rebound/guarding  PSYCH: A+O x3  NEUROLOGY: mild R facial droop (baseline), no new FND      LABS:                         14.9   8.00  )-----------( 315      ( 15 Jean 2025 10:01 )             42.7     06-15    134[L]  |  95[L]  |  25[H]  ----------------------------<  133[H]  3.6   |  24  |  1.32[H]    Ca    9.6      15 Jean 2025 10:01        Urinalysis Basic - ( 15 Jean 2025 10:01 )    Color: x / Appearance: x / SG: x / pH: x  Gluc: 133 mg/dL / Ketone: x  / Bili: x / Urobili: x   Blood: x / Protein: x / Nitrite: x   Leuk Esterase: x / RBC: x / WBC x   Sq Epi: x / Non Sq Epi: x / Bacteria: x        Labs  reviewed

## 2025-06-15 NOTE — PROGRESS NOTE ADULT - PROBLEM SELECTOR PLAN 6
TTE 7/2024: moderately reduced EF, G1DD. Has struggled with chronic LE edema, unclear how much is 2/2 to HF vs Chronic venous insufficieny (per outpatient notes appears more likely the later)   -Continue diuresis with torsemide 20 mg qd  -continue spironolactone 25mg daily and metop 25mg daily  -previously on ace, off currently  - f/u cardiology recs

## 2025-06-16 ENCOUNTER — TRANSCRIPTION ENCOUNTER (OUTPATIENT)
Age: 85
End: 2025-06-16

## 2025-06-16 ENCOUNTER — RESULT REVIEW (OUTPATIENT)
Age: 85
End: 2025-06-16

## 2025-06-16 LAB
ANION GAP SERPL CALC-SCNC: 14 MMOL/L — SIGNIFICANT CHANGE UP (ref 5–17)
BUN SERPL-MCNC: 27 MG/DL — HIGH (ref 7–23)
CALCIUM SERPL-MCNC: 9.4 MG/DL — SIGNIFICANT CHANGE UP (ref 8.4–10.5)
CHLORIDE SERPL-SCNC: 95 MMOL/L — LOW (ref 96–108)
CO2 SERPL-SCNC: 26 MMOL/L — SIGNIFICANT CHANGE UP (ref 22–31)
CREAT SERPL-MCNC: 1.26 MG/DL — SIGNIFICANT CHANGE UP (ref 0.5–1.3)
EGFR: 57 ML/MIN/1.73M2 — LOW
EGFR: 57 ML/MIN/1.73M2 — LOW
GLUCOSE SERPL-MCNC: 104 MG/DL — HIGH (ref 70–99)
POTASSIUM SERPL-MCNC: 3.6 MMOL/L — SIGNIFICANT CHANGE UP (ref 3.5–5.3)
POTASSIUM SERPL-SCNC: 3.6 MMOL/L — SIGNIFICANT CHANGE UP (ref 3.5–5.3)
SODIUM SERPL-SCNC: 135 MMOL/L — SIGNIFICANT CHANGE UP (ref 135–145)

## 2025-06-16 PROCEDURE — 99232 SBSQ HOSP IP/OBS MODERATE 35: CPT

## 2025-06-16 PROCEDURE — 93308 TTE F-UP OR LMTD: CPT | Mod: 26

## 2025-06-16 RX ADMIN — IPRATROPIUM BROMIDE AND ALBUTEROL SULFATE 3 MILLILITER(S): .5; 2.5 SOLUTION RESPIRATORY (INHALATION) at 11:56

## 2025-06-16 RX ADMIN — ROSUVASTATIN CALCIUM 10 MILLIGRAM(S): 20 TABLET, FILM COATED ORAL at 22:20

## 2025-06-16 RX ADMIN — LINACLOTIDE 72 MICROGRAM(S): 290 CAPSULE, GELATIN COATED ORAL at 05:39

## 2025-06-16 RX ADMIN — Medication 20 MILLIGRAM(S): at 05:39

## 2025-06-16 RX ADMIN — IPRATROPIUM BROMIDE AND ALBUTEROL SULFATE 3 MILLILITER(S): .5; 2.5 SOLUTION RESPIRATORY (INHALATION) at 05:39

## 2025-06-16 RX ADMIN — Medication 4 MILLILITER(S): at 11:55

## 2025-06-16 RX ADMIN — IPRATROPIUM BROMIDE AND ALBUTEROL SULFATE 3 MILLILITER(S): .5; 2.5 SOLUTION RESPIRATORY (INHALATION) at 18:01

## 2025-06-16 RX ADMIN — MIRABEGRON 25 MILLIGRAM(S): 50 TABLET, FILM COATED, EXTENDED RELEASE ORAL at 11:55

## 2025-06-16 RX ADMIN — Medication 4 MILLILITER(S): at 05:39

## 2025-06-16 RX ADMIN — DEXTROMETHORPHAN HBR, GUAIFENESIN 600 MILLIGRAM(S): 200 LIQUID ORAL at 18:01

## 2025-06-16 RX ADMIN — Medication 25 MILLIGRAM(S): at 05:40

## 2025-06-16 RX ADMIN — ENOXAPARIN SODIUM 60 MILLIGRAM(S): 100 INJECTION SUBCUTANEOUS at 05:40

## 2025-06-16 RX ADMIN — METOPROLOL SUCCINATE 25 MILLIGRAM(S): 50 TABLET, EXTENDED RELEASE ORAL at 05:40

## 2025-06-16 RX ADMIN — DEXTROMETHORPHAN HBR, GUAIFENESIN 600 MILLIGRAM(S): 200 LIQUID ORAL at 05:39

## 2025-06-16 RX ADMIN — ENOXAPARIN SODIUM 60 MILLIGRAM(S): 100 INJECTION SUBCUTANEOUS at 18:01

## 2025-06-16 RX ADMIN — ESCITALOPRAM OXALATE 10 MILLIGRAM(S): 20 TABLET ORAL at 11:55

## 2025-06-16 RX ADMIN — Medication 100 MILLIGRAM(S): at 11:55

## 2025-06-16 RX ADMIN — Medication 4 MILLILITER(S): at 18:01

## 2025-06-16 NOTE — SWALLOW BEDSIDE ASSESSMENT ADULT - ADDITIONAL RECOMMENDATIONS
GOALS 1. Pt/family/caregiver will demonstrate understanding and carryover of dysphagia management (safe swallow guidelines, compensatory strategies, dysphagia diet). 2. Pt will complete dysphagia exercises to improve swallow function. 3. Pt will tolerate recommended diet with no overt, clinical s/s of aspiration.

## 2025-06-16 NOTE — SWALLOW BEDSIDE ASSESSMENT ADULT - SLP GENERAL OBSERVATIONS
Patient received upright in bed, resting; 2L O2 via NC; rousable to verbal/tactile stimuli, which was able to be maintained throughout alertness. AAOx3-4; +baseline cough; able to make immediate wants/needs known and follow commands for the purpose of this evaluation.

## 2025-06-16 NOTE — SWALLOW BEDSIDE ASSESSMENT ADULT - SWALLOW EVAL: RECOMMENDED FEEDING/EATING TECHNIQUES
avoid straws/allow for swallow between intakes/maintain upright posture during/after eating for 30 mins/position upright (90 degrees)/small sips/bites

## 2025-06-16 NOTE — SWALLOW BEDSIDE ASSESSMENT ADULT - SWALLOW EVAL: ORAL MUSCULATURE
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 CT c/a/p in 2022 showed progression in the liver and sigmoid. Follows with Dr Champagne.      Patient had presented to the ED on 2022 after a mechanical fall, but patient had refused admission at that time. She was then admitted on 22 for increasing drainage from the LLQ abdominal wall and she underwent drain placement by IR after CT abd/pelvis revealed a large air-fluid collection of the left lower abdomen overlying the anterior inferior iliac spine posterior to the left pectineus muscle suspicious for abscess. Ortho surgery was consulted a that time due to persistent left hip pain and concerns for septic joint. Left hip aspiration was performed, cultures were negative. Repeat imaging showed resolution of abscess. She was discharged on oral abx on 2022 to [facility].      She then presented to her orthopedic office due to progressively worsening left hip pain since August. She had been participating with PT, but was not making progress and had been using a wheelchair for nearly 2 months. She was found ot have a left femoral neck fracture. Surgical options were dicussed at that time per ortho surgery and patient had decided she wanted to pursue surgery.      Unfortunately her surgery was canceled on 10/03/2023 due to patient not feeling well as well as increasing WBC and creatinine. Patient was admitted for further evaluation and  treatment.      Patient required ICU stay this admission after she had developed seizure like activity and had persistent AMS, she was unable to protect her airway and was transferred to the Neuro ICU s/p intubation. She was able to be successfully extubated and transferred out of ICU and back to the medical floor.     Interval Events:   --01/24/2023: STAT team called due to lethargy, POA updated, code status changed to DNR with no plans of escalation of cares    Subjective        Review of Systems  Review of Systems  Tired      ·   Palliative Care Assessment Tools    Pain Assessment N/A                          ESAS Scale  N/A        Palliative Performance Scale   Palliative Performance Scale: Ambulation Totally Bed-Bound. Unable to do Any Work Extensive Disease. Self-Care Total Care. Intake Minimal Sips. Consciousness Level Full or Drowsy or Confusion.          Functional Assessment Staging (FAST) N/A        Heart Failure Tools N/A              Objective      Vital Signs:   Vital Last Value (24 Hour) 24 Hour Range   Temperature 97.8 °F (36.6 °C) (01/24/23 1945) Temp  Min: 97.8 °F (36.6 °C)  Max: 97.8 °F (36.6 °C)   Pulse 87 (01/25/23 0728) Pulse  Min: 82  Max: 87   Arterial   Blood Pressure   No data recorded   Non-Invasive  Blood Pressure 110/67 (01/25/23 0728) BP  Min: 110/67  Max: 113/68   Central Venous Pressure   No data recorded   Respiratory 18 (01/25/23 0728) Resp  Min: 16  Max: 18   SpO2 99 % (01/25/23 0728) SpO2  Min: 97 %  Max: 99 %   Last BM:      Weight Trends    Wt Readings from Last 10 Encounters:   01/25/23 52 kg (114 lb 10.2 oz)   10/06/22 54.2 kg (119 lb 6.4 oz)   10/03/22 53.5 kg (118 lb)   09/19/22 57 kg (125 lb 10.6 oz)   08/21/22 54.7 kg (120 lb 9.5 oz)   08/08/22 57.9 kg (127 lb 10.3 oz)   08/01/22 59.8 kg (131 lb 13.4 oz)   07/25/22 57 kg (125 lb 10.6 oz)   07/18/22 59.5 kg (131 lb 1.6 oz)   06/22/22 59.9 kg (132 lb)       Physical Exam  Physical Exam:  GEN:  The patient is mostly resting  with eyes closed, but does states she is listening and participates in areas of conversation with family at the bedside, cooperative, appears older than stated age. NAD.  MS: Moderate sarcopenia   EYES: No icterus or conjunctivitis. Normal lids.   HENT: NCAT, dry mucous membranes  NECK: Trachea midline  CHEST: Symmetrical with respirations  PUL: Breathing is regular and unlabored on RA at rest  ABD: Soft, nontender, nondistended.  EXTREMITIES:  BUE without cyanosis, clubbing or edema.   SKIN:  Warm, dry, and well perfused. No rashes on visible skin. Large area of ecchymosis to LUE. +pressure sore to coccyx.   Psychiatric: Speech is mostly coherent. Flat affect. Verbalizes understanding of our discussion.     Labs & Imaging  Recent Labs   Lab 01/25/23  0348 01/24/23  1145 01/23/23  0700 01/22/23  0412 01/21/23  0415 01/20/23  0353 01/19/23  0350   SODIUM 136 136 136   < > 139   < > 139   POTASSIUM 5.4* 5.5* 5.0   < > 4.4   < > 3.9   CHLORIDE 105 105 105   < > 108   < > 107   CO2 22 22 23   < > 23   < > 24   BUN 87* 87* 84*   < > 72*   < > 53*   CREATININE 3.85* 3.54* 3.20*   < > 2.50*   < > 2.08*   CALCIUM 7.3* 8.1* 8.8   < > 8.5   < > 8.6   ALBUMIN  --  1.6*  --   --  1.5*  --  1.4*   BILIRUBIN  --  1.4*  --   --  0.8  --  0.8   ALKPT  --  414*  --   --  552*  --  598*   GPT  --  84*  --   --  83*  --  78*   AST  --  145*  --   --  213*  --  257*   GLUCOSE 99 76 93   < > 90   < > 94    < > = values in this interval not displayed.      Recent Labs   Lab 01/25/23  0348   WBC 17.4*   RBC 3.01*   HGB 8.0*   HCT 26.4*   *        Results for orders placed or performed during the hospital encounter of 01/03/23 (from the past 72 hour(s))   US Renal Complete (Comp Urinary System)    Impression    IMPRESSION:    1. No evidence of hydroureteronephrosis.  2.  Bilateral simple renal cysts.             XR Pelvis 1 or 2 Views    Impression    IMPRESSION:  Interval resolution of this postoperative subcutaneous gas and  removal of  the lateral drain adjacent to the left hip.  Otherwise no change from 1/10/2023.           Advance Care Planning/Goals of Care/Discussion   Medical Decision-making capacity: Unable to Determine Brother/POA (Seth) heavily involved in decision making process currently.   POAHC/Substitute Decision Maker: POAHC / Document Located: In EMR    POAHC: Listed #1 Seth Marrero (Relation: Brother), Listed #2 Pelon Marrero (Relation: Brother), Listed #3 Monica Marrero (Sister-in-law)  Code Status:   Code Status: Do Not Resuscitate   State DNR: None     Goals of Care Discussion  Participants: Writer, Patient, Patient's brother/POA (Seth), Patient's sister-in-law (Aurea)  Location: patient's room  Patient able to participate: Yes (at times)   Decision maker: Patient/POA    Summary of discussion:  --Updated per RN staff. Patient not taking medications this AM, continues with minimal PO intake, requiring total cares.     --Met with above participants at the bedside. Introduced self and role of palliative care. Again Seth states she has received thorough updates on patient's current medical challenges by both Dr Crouch and Dr Champagne and has updated his wife (present as well). Patient oriented to place (\"hospital\"), but unable to list any of her current medical problems. She reports \"they are trying to make me better.\"     --Per family, patient's mentation has been as her current state for about the past 2 weeks now without improvement. Seth shares that she seems to have set back after set back with her infections. He states he has never directly talked to her about dialysis, but understands that even if some things could improve with dialysis, she would still have her metastatic cancer to continue to deal with and does not believe she will be able to improve to be able to receive treatment again and states he knows the cancer continues to grow while she is not receiving treatment.     --Seth states he as talked directly  with Juanita regarding feeding tube and this is something she very clearly has stated she would not want. He also firmly believes she would not want a transfer to the ICU if her current state were to further decline. He states they know the cancer cannot be cured, \"could continue to drag it out, but what is the point.\"     --Juanita states she has been listening to the above conversation. Asked patient if she is agreeing with what her brother has shared or if she feels differently and she states there is \"nothing I'm not agreeing with.\" Seth also reports he has had multiple conversation with other persons close to Juanita including other siblings and very close neighbors they have grown up with and all are in agreement with not further escalating cares, that this is not something Juanita would want. Asked Seth if he feels Juanita would find her QOL acceptable to prolong and he reports \"I know that is a no.\" Then asked Juanita if she feels this is a QOL she finds acceptable to prolong and patient replies \"absolutely not.\"     --Discussed the option of hospice. Seth and Aurea verbalize understanding of the philosophy and goals of hospice. Discussed with patient. Patient too states she is familiar with hospice, but does not address if this is an option she would like to pursue at this time. Patient was given the opportunity to talk alone with both my self and then with her family. During my discussion with patient she told me its not that she is not ready to make a decision, but then did not elaborate. She states she would like peace and quiet at this time and wants more time to think about her options.     --Discussed with Seth and Aurea. They are hopeful patient can continue to weigh in on her wishes and ultimately say what she wants.     Patient and family agreeable to meeting again tomorrow AM. Family would like to discuss possible hospice options with both the  and [place] hospice liaisons.                    Assessment         -- Encounter for Palliative Care   -- Goals of Care  -- Metastatic colorectal cancer, with mets to liver, lungs, and lymph nodes   --Left hip septic joint, s/p left hip arthroplasty on 01/10/2023 for left femoral head collapse and presumed septic joint, abx  --Encephalopathy, toxic metabolic  --JEANNE, nephrology following   --Recent acute hypercarbic/hypoxic respiratory failure, required intubation, now extubated   --Poor PO intake with protein calorie malnutrition   --Physical debility              Plan      No new Assessment & Plan notes have been filed under this hospital service since the last note was generated.  Service: Palliative Care    Recommendations & Plan:  1. Goals of Care: Continue current supportive cares with no plans to further escalates cares if patient's condition further declines.   o Patient /POA do not want dialysis, feeding tube, or transfer to ICU.   o Considering hospice, would like to further discuss with SW and hospice liaison options for hospice cares.   o Family hoping patient will continue to weigh in on her wishes. Patient states she wants more time to think. All agreeable to meeting again tomorrow AM to discuss.   2. Code Status: DNR  o Will need state DNR prior to discharge   3. POA-HC: NOT activated, although would strongly recommend, brother/POA Seth be involved in decision making.   4. Spiritual Care/Counseling: Empathetic listening and emotional support provided to patient and family.   5. Disposition: TBD  6. Hospice Qualifying Diagnosis: Metastatic colorectal cancer, protein calorie malnutrition (albumin 1.6), physical debility/PPS score (20).  7. Palliative Care will continue to follow closely for ongoing goals of care discussions.         Billing Statement: Total time for today's encounter was 122 minutes.  Discussed with: Patient, Patient's brother/POA (Seth), Patient's sister-in-law (Aurea), Dr Crouch, Laureano RN, Zayra RN,  Iman BLAS, Angela Alvarez RN.      Thank you for  involving Palliative Care.     OLIVE Cole  [facility] Palliative [facility]  Preferred Method of Communication: Epic SecureChat  (Between the hours of 5 PM and 9AM, as well as Sat and Sun,   please call Palliative Care answering service at [phone])    Note to Patient: The 21st Century Cures Act makes medical notes like these available to patients in the interest of transparency. However, be advised this is a medical document. It is intended as [id number] communication. It is written in medical language and may contain abbreviations or verbiage that are unfamiliar. It may appear blunt or direct. Medical documents are intended to carry relevant information, facts as evident, and the clinical opinion of the practitioner.  This note may have been transcribed using a voice dictation system. Voice recognition errors may occur. This should not be taken to alter the content or meaning of this note.       Progress Note For Department Use Only          LVAD: No  #1 Post-Visit: Yes  #2 Post-Visit: Yes  #3 Post-Visit: No          generally intact

## 2025-06-16 NOTE — SWALLOW BEDSIDE ASSESSMENT ADULT - SWALLOW EVAL: DIAGNOSIS
Patient is a Patient is an 84 year old male with pmhx of HTN, HLD, CAD, CHF presenting with frequent falls, cough. found with PE. Patient now presents with evidence of a mild oropharyngeal dysphagia, however fully functional to resume regular/thin. Swallow sequence is marked by prolonged yet functional mastication across chewables, appropriate bolus formation and anterior-posterior transit, suspect delay in pharyngeal swallow trigger, palpable hyolaryngeal excursion. Instance of spontaneous cough post-intake of thin liquids via consecutive straw sips. No overt s/s of aspiration observed across administered solids or thin liquids via cup or thin liquids via straw via single sips. This service to f/u at bedside for diet monitoring as schedule permits.

## 2025-06-16 NOTE — SWALLOW BEDSIDE ASSESSMENT ADULT - PHARYNGEAL PHASE
no overt s/s of aspiration/Delayed pharyngeal swallow instance of spontaneous cough post-intake of thin liquid via consecutive straw sips; no overt s/s of aspiration observed with thin liquids via cup or thin liquids via straw when cued to take single sips/Delayed pharyngeal swallow

## 2025-06-16 NOTE — DISCHARGE NOTE PROVIDER - CARE PROVIDER_API CALL
Luis Tam  Neurosurgery  [address], [address]  [place] [address]  Phone: [phone]  Fax: [phone]  Follow Up Time: 2 weeks

## 2025-06-16 NOTE — DISCHARGE NOTE PROVIDER - CARE PROVIDERS DIRECT ADDRESSES
,[email or web address]

## 2025-06-16 NOTE — DISCHARGE NOTE PROVIDER - HOSPITAL COURSE
HPI:  85 y/o M with past medical history of HTN, HLD, CAD with stents on DAPT, CHF presenting from the [facility] with falls. Per the granddaughter, patient has had multiple falls over the past few months (usually from sliding out of bed). Over the course of the past week, patient has had a new cough and resultant weakness and unsteadiness. The day prior to presentation, patient had a fall while ambulating with a walker. Family saw the patient that day and noted no new symptoms (f/chills/SOB/dysuria/worsened LE edema). After they left he had 2 more falls so presented to the ED.     In the ED afeb hds, labs notable for Cr 1.8 (unclear baseline) proBNP 2933. UA negative. CXR without infiltrate. Duplex with RLE DVT. started on hep gtt.  (31 May 2025 12:52)    Hospital Course:  Patient was admitted for fall (history of recurrent falls) and acute respiratory failure with hypoxia concerning for CHF exacerbation. Patient was started on 6L NC. Lower extremity swelling noted, LE dopplers positive for acute R popliteal DVT. CT Angio positive for new pulmonary emboli bilaterally. Started heparin gtt. Vascular Cardiology consulted, recommended no catheter based procedures at the time. Course complicated by non-traumatic subdural hemorrhage, CT head on 6/6/25 showed L frontal arafalcine subdural hemorrhage new since 5/31/2025, stable on short interval repeat and subsequent 24hr scan. Neurosurgery consulted, held heparin gtt. Recommended IVC Filter, however discussed with family no absolute neurosurgical contraindication to systemic anticoagulation (though it does have increased risk with morbidity including but not limited to headache, seizure, stroke, paralysis, and in severe cases even death). Restarted heparin and CT head 48 hours after re-initiation was stable. Can follow up outpatient with Dr. Tam. Per vascular cardiology, transitioned to Lovenox 70% therapeutic dose 6/10/25. Repeat imaging (CTH, LE venous duplex and TTE) reassuring. Evaluated by physical therapy and recommended to go to subacute rehab. On day of discharge, patient was weaned to 2L NC with plans to continue weaning O2 while at rehab; airway clearance ordered to facilitate breathing, while inpatient.    Patient is medically cleared for discharge. Medication reconciliation reviewed, revised, and resolved with Dr. High who had medically cleared patient for discharge with follow-up as advised. Patient is currently stable for discharge to subacute rehab at this time.    Important Medication Changes and Reason:  Please see medication reconciliation for any medication changes    Active or Pending Issues Requiring Follow-up:  - PCP  - Neurosurgery Dr. Tam 1-2 weeks    Advanced Directives:   [x] Full code  [ ] DNR  [ ] Hospice    Discharge Diagnoses:  Fall  AHRF  Non-traumatic SDH  PE  HFpEF  CAD     HPI:  85 y/o M with past medical history of HTN, HLD, CAD with stents on DAPT, CHF presenting from the [facility] with falls. Per the granddaughter, patient has had multiple falls over the past few months (usually from sliding out of bed). Over the course of the past week, patient has had a new cough and resultant weakness and unsteadiness. The day prior to presentation, patient had a fall while ambulating with a walker. Family saw the patient that day and noted no new symptoms (f/chills/SOB/dysuria/worsened LE edema). After they left he had 2 more falls so presented to the ED.     In the ED afeb hds, labs notable for Cr 1.8 (unclear baseline) proBNP 2933. UA negative. CXR without infiltrate. Duplex with RLE DVT. started on hep gtt.  (31 May 2025 12:52)    Hospital Course:  Patient was admitted for fall (history of recurrent falls) and acute respiratory failure with hypoxia concerning for CHF exacerbation. Patient was started on 6L NC. Lower extremity swelling noted, LE dopplers positive for acute R popliteal DVT. CT Angio positive for new pulmonary emboli bilaterally. Started heparin gtt. Vascular Cardiology consulted, recommended no catheter based procedures at the time. Course complicated by non-traumatic subdural hemorrhage, CT head on 6/6/25 showed L frontal arafalcine subdural hemorrhage new since 5/31/2025, stable on short interval repeat and subsequent 24hr scan. Neurosurgery consulted, held heparin gtt. Recommended IVC Filter, however discussed with family no absolute neurosurgical contraindication to systemic anticoagulation (though it does have increased risk with morbidity including but not limited to headache, seizure, stroke, paralysis, and in severe cases even death). Restarted heparin and CT head 48 hours after re-initiation was stable. Can follow up outpatient with Dr. Tam. Per vascular cardiology, transitioned to Lovenox 70% therapeutic dose 6/10/25. Plavix held. Repeat imaging (CTH, LE venous duplex and TTE) reassuring. Pt's home gabapentin restarted at low dose (600 mg daily rather than TID); evaluated by physical therapy and recommended to go to subacute rehab. On day of discharge, patient was weaned to 2L NC with plans to continue weaning O2 while at rehab; airway clearance ordered to facilitate breathing, while inpatient.    Patient is medically cleared for discharge. Medication reconciliation reviewed, revised, and resolved with Dr. High who had medically cleared patient for discharge with follow-up as advised. Patient is currently stable for discharge to subacute rehab at this time.    Important Medication Changes and Reason:  Please see medication reconciliation for any medication changes    Active or Pending Issues Requiring Follow-up:  - PCP  - Neurosurgery Dr. Tam 1-2 weeks    Advanced Directives:   [x] Full code  [ ] DNR  [ ] Hospice    Discharge Diagnoses:  Fall  Acute hypoxic respiratory failure in setting of PE  Non-traumatic SDH  Bilateral PE  HFpEF  MATILDA, cardiorenal in nature   CAD     HPI:  83 y/o M with past medical history of HTN, HLD, CAD with stents on DAPT, CHF presenting from the [facility] with falls. Per the granddaughter, patient has had multiple falls over the past few months (usually from sliding out of bed). Over the course of the past week, patient has had a new cough and resultant weakness and unsteadiness. The day prior to presentation, patient had a fall while ambulating with a walker. Family saw the patient that day and noted no new symptoms (f/chills/SOB/dysuria/worsened LE edema). After they left he had 2 more falls so presented to the ED.     In the ED afeb hds, labs notable for Cr 1.8 (unclear baseline) proBNP 2933. UA negative. CXR without infiltrate. Duplex with RLE DVT. started on hep gtt.  (31 May 2025 12:52)    Hospital Course:  Patient was admitted for fall (history of recurrent falls) and acute respiratory failure with hypoxia concerning for CHF exacerbation. Patient was started on 6L NC. Lower extremity swelling noted, LE dopplers positive for acute R popliteal DVT. CT Angio positive for new pulmonary emboli bilaterally. Started heparin gtt. Vascular Cardiology consulted, recommended no catheter based procedures at the time. Course complicated by non-traumatic subdural hemorrhage, CT head on 6/6/25 showed L frontal arafalcine subdural hemorrhage new since 5/31/2025, stable on short interval repeat and subsequent 24hr scan. Neurosurgery consulted, held heparin gtt. Recommended IVC Filter, however discussed with family no absolute neurosurgical contraindication to systemic anticoagulation (though it does have increased risk with morbidity including but not limited to headache, seizure, stroke, paralysis, and in severe cases even death). Restarted heparin and CT head 48 hours after re-initiation was stable. Can follow up outpatient with Dr. Tam. Per vascular cardiology, transitioned to Lovenox 70% therapeutic dose 6/10/25. Plavix held. Repeat imaging (CTH, LE venous duplex and TTE) reassuring. Pt's home gabapentin held (on 600 mg TID); can consider restarting at low dose  as tolerated. He was weaned from 5L NC to 2L NC for several days, then RA prior to discharge. Pt was evaluated by physical therapy and recommended to go to subacute rehab.    Patient is medically cleared for discharge. Medication reconciliation reviewed, revised, and resolved with Dr. High who had medically cleared patient for discharge with follow-up as advised. Patient is currently stable for discharge to subacute rehab at this time.    Important Medication Changes and Reason:  Please see medication reconciliation for any medication changes    Active or Pending Issues Requiring Follow-up:  - PCP  - Neurosurgery Dr. Tam 1-2 weeks    Advanced Directives:   [x] Full code  [ ] DNR  [ ] Hospice    Discharge Diagnoses:  Fall  Acute hypoxic respiratory failure in setting of PE  Non-traumatic SDH  Bilateral PE  HFpEF  MATILDA, cardiorenal in nature   CAD     HPI:  83 y/o M with past medical history of HTN, HLD, CAD with stents on DAPT, CHF presenting from the [facility] with falls. Per the granddaughter, patient has had multiple falls over the past few months (usually from sliding out of bed). Over the course of the past week, patient has had a new cough and resultant weakness and unsteadiness. The day prior to presentation, patient had a fall while ambulating with a walker. Family saw the patient that day and noted no new symptoms (f/chills/SOB/dysuria/worsened LE edema). After they left he had 2 more falls so presented to the ED.     In the ED afeb hds, labs notable for Cr 1.8 (unclear baseline) proBNP 2933. UA negative. CXR without infiltrate. Duplex with RLE DVT. started on hep gtt.  (31 May 2025 12:52)    Hospital Course:  Patient was admitted for fall (history of recurrent falls) and acute respiratory failure with hypoxia concerning for CHF exacerbation. Patient was started on 6L NC. Lower extremity swelling noted, LE dopplers positive for acute R popliteal DVT. CT Angio positive for new pulmonary emboli bilaterally. Started heparin gtt. Vascular Cardiology consulted, recommended no catheter based procedures at the time. Course complicated by non-traumatic subdural hemorrhage, CT head on 6/6/25 showed L frontal arafalcine subdural hemorrhage new since 5/31/2025, stable on short interval repeat and subsequent 24hr scan. Neurosurgery consulted, held heparin gtt. Recommended IVC Filter, however discussed with family no absolute neurosurgical contraindication to systemic anticoagulation (though it does have increased risk with morbidity including but not limited to headache, seizure, stroke, paralysis, and in severe cases even death). Restarted heparin and CT head 48 hours after re-initiation was stable. Can follow up outpatient with Dr. Tam. Per vascular cardiology, transitioned to Lovenox 70% therapeutic dose 6/10/25. Plavix held. Repeat imaging (CTH, LE venous duplex and TTE) reassuring. Pt's home gabapentin held (on 600 mg TID); can consider restarting at low dose  as tolerated. He was weaned from 5L NC to 2L NC for several days, then RA prior to discharge; airway clearance with duonebs, inhaled hypertonic nebs and acetylcysteine. Pt was evaluated by physical therapy and recommended to go to subacute rehab.    Patient is medically cleared for discharge. Medication reconciliation reviewed, revised, and resolved with Dr. High who had medically cleared patient for discharge with follow-up as advised. Patient is currently stable for discharge to subacute rehab at this time.    Important Medication Changes and Reason:  Please see medication reconciliation for any medication changes    Active or Pending Issues Requiring Follow-up:  - PCP  - Neurosurgery Dr. Tam 1-2 weeks    Advanced Directives:   [x] Full code  [ ] DNR  [ ] Hospice    Discharge Diagnoses:  Fall  Acute hypoxic respiratory failure in setting of PE  Non-traumatic SDH  Bilateral PE  HFpEF  MATILDA, cardiorenal in nature   CAD

## 2025-06-16 NOTE — DISCHARGE NOTE PROVIDER - ATTENDING DISCHARGE PHYSICAL EXAMINATION:
CONSTITUTIONAL: Well-groomed, in no apparent distress  EYES: No conjunctival or scleral injection  ENMT: No external nasal lesions; oral mucosa with moist membranes  RESPIRATORY: Breathing comfortably; lungs CTA without wheeze/rales  CARDIOVASCULAR: +S1S2, RRR, no M/G/R; pedal pulses full and symmetric, no peripheral edema  GASTROINTESTINAL: No palpable tenderness, +BS throughout, no rebound/guarding  MUSCULOSKELETAL: no digital clubbing or cyanosis  SKIN: No rashes or ulcers noted  NEUROLOGIC: mild R sided facial droop (baseline), no new FND  PSYCHIATRIC: A+O x 3 CONSTITUTIONAL: Well-groomed, in no apparent distress  EYES: No conjunctival or scleral injection  ENMT: No external nasal lesions; oral mucosa with moist membranes  RESPIRATORY: Breathing comfortably on RA, occasional cough/wheeze  CARDIOVASCULAR: +S1S2, RRR, no M/G/R; pedal pulses full and symmetric, no edema  GASTROINTESTINAL: No palpable tenderness, +BS throughout, no rebound/guarding  MUSCULOSKELETAL: no digital clubbing or cyanosis  SKIN: No rashes or ulcers noted  NEUROLOGIC: mild R sided facial droop (baseline), no new FND  PSYCHIATRIC: A+O x 3, with moments of confusion/reorientation

## 2025-06-16 NOTE — PROGRESS NOTE ADULT - SUBJECTIVE AND OBJECTIVE BOX
Vascular Cardiology Progress Note     DIRECT PROVIDER NUMBER: [phone]  / Available on TEAMS    CC:  weakness and DVT    Interval Events:  Reports breathing improved.  Reduced cough.   Remains on Lovenox 60 mg BID.   LE venous duplex 6/15 showed:  No evidence of deep venous thrombosis in either lower extremity.   Limited calf veins evaluation .    MEDICATIONS  (STANDING):  albuterol/ipratropium for Nebulization 3 milliLiter(s) Nebulizer every 6 hours  allopurinol 100 milliGRAM(s) Oral daily  enoxaparin Injectable 60 milliGRAM(s) SubCutaneous every 12 hours  escitalopram 10 milliGRAM(s) Oral daily  guaiFENesin  milliGRAM(s) Oral every 12 hours  linaclotide 72 MICROGram(s) Oral before breakfast  metoprolol succinate ER 25 milliGRAM(s) Oral daily  mirabegron ER 25 milliGRAM(s) Oral daily  rosuvastatin 10 milliGRAM(s) Oral at bedtime  sodium chloride 3%  Inhalation 4 milliLiter(s) Inhalation every 6 hours  spironolactone 25 milliGRAM(s) Oral daily    PAST MEDICAL & SURGICAL HISTORY:  Chronic diastolic congestive heart failure  CAD (coronary artery disease)  No significant past surgical history    FAMILY HISTORY:  No pertinent family history in first degree relatives    SOCIAL HISTORY:  unchanged    REVIEW OF SYSTEMS:  CONSTITUTIONAL: No fever  EYES: No eye pain  ENT:  No throat pain  NECK: No pain   RESPIRATORY: SOB on admission    CARDIOVASCULAR:  No C/P  GASTROINTESTINAL: No abdominal pain  GENITOURINARY: No hematuria  SKIN: No LE wounds  LYMPH Nodes: No enlarged glands noted  ENDOCRINE: No heat or cold intolerance noted  MUSCULOSKELETAL:  Decreased LE edema  PSYCHIATRIC: No depression, anxiety  HEME/LYMPH: No bleeding gums  ALLERGY AND IMMUNOLOGIC: No hives    [ x] All others negative	    Vital Signs Last 24 Hrs  T(C): 36.6 (2025 05:05), Max: 36.8 (2025 00:39)  T(F): 97.9 (2025 05:05), Max: 98.3 (2025 00:39)  HR: 63 (2025 05:05) (52 - 79)  BP: 131/78 (2025 05:05) (108/64 - 155/78)  BP(mean): --  RR: 18 (2025 05:05) (16 - 18)  SpO2: 94% (2025 05:05) (91% - 97%)    Parameters below as of 2025 05:05  Patient On (Oxygen Delivery Method): nasal cannula  O2 Flow (L/min): 2      Appearance: NAD  Cardiovascular: RRR, S1 and S2  Respiratory: Decreased BS Bases  Gastrointestinal:  Soft, Non-tender, + BS	  Skin: No cyanosis	  Extremities:  LE edema, bilateral calves soft   Foot Exam: No tissue loss               Labs:                        14.9   8.00  )-----------( 315      ( 15 Jean 2025 10:01 )             42.7     06-16    135  |  95[L]  |  27[H]  ----------------------------<  104[H]  3.6   |  26  |  1.26    Ca    9.4      2025 06:55      Urinalysis Basic - ( 2025 06:55 )    Color: x / Appearance: x / SG: x / pH: x  Gluc: 104 mg/dL / Ketone: x  / Bili: x / Urobili: x   Blood: x / Protein: x / Nitrite: x   Leuk Esterase: x / RBC: x / WBC x   Sq Epi: x / Non Sq Epi: x / Bacteria: x      Assessment:  1. Bilateral PE  Lobar and Segmental PE  2. R LE DVT  R popliteal DVT (resolved)  3. HFpEF  4. Cough  5. CAD s/p PCI  6. HTN  7. History of Falls  8. EF 46%  9. SDH  10. LV thrombus    Plan:  1. Transitioned to Lovenox 60 mg BID  (70% of therapeutic dose) on 6/10. Recommend to continue.   Dose modification recommended in setting of SDH. Paired with surveillance TTE and LE venous duplex.  2. Appreciate Neurosurgical evaluation.  3. Repeat CT head on 12 for surveillance of SDH, read as smaller and with no new hemorrhage.  4. Repeat LE venous duplex on 6/15 without DVT noted. Study improved.  5. Recommend repeat TTE on  for surveillance of LV thrombus.   6. Creatinine improving . Volume status stable. Continue Torsemide 20 mg daily.  7. Pneumatic compression to L lower extremity.  8. If TTE is stable, patient is stable for transfer to [facility], from a Vascular Cardiology perspective.   9. Appreciate excellent care by Dr. Ruffin and Medicine Team.     Thank you  STEVEN Bryant, MS, [facility]  Vascular Cardiology Service    Please call with any questions:   DIRECT SERVICE NUMBER: [phone] / Available on TEAMS

## 2025-06-16 NOTE — DISCHARGE NOTE PROVIDER - NSDCCPCAREPLAN_GEN_ALL_CORE_FT
PRINCIPAL DISCHARGE DIAGNOSIS  Diagnosis: Right leg DVT  Assessment and Plan of Treatment: Continue taking "blood thinners" as prescribed.  Blood thinners can result in abnormal bleeding and brushing.  Be mindful to avoid accidental trauma.  Participate in activities as prescribed.  If you develop new leg pain, swelling, and/or redness contact your healthcare provider.  Call your doctor if you experience lightheadedness, loss of consciousness, shortness of breath (especially with exercise), feel your heart racing or beating unusually, or experience frequent or abnormal bleeding.      SECONDARY DISCHARGE DIAGNOSES  Diagnosis: Pulmonary embolism  Assessment and Plan of Treatment: Take your anticoagulation (Lovenox) as directed.  Follow up with your health care provider within one week. Call for appointment.  If you develop shortness of breath or if your shortness of breath worsens call your Health Care Provider or go to the Emergency Department.    Diagnosis: Fall  Assessment and Plan of Treatment: Causes of fall may be due to illness, change in the medicines you take, unsafe or unfamiliar setting and conditions that affect eyesight, hearing, muscle strength, or balance.                                                            Certain medicines used for sleep, anxiety, or depression can cause falls. Adding new medicines, or changing doses of some medicines, can also affect your risk of falling. Your doctor might switch you to a different medicine.  Prevent falls by make your home safer – To avoid falling at home, get rid of things that might make you trip or slip. This might include furniture, electrical cords, clutter, and loose rugs. Keep your home well lit to avoid falls or accidents. Avoid storing things in high places so you don't have to reach or climb.     Wear sturdy shoes that fit well – Wearing shoes with high heels or slippery soles, or shoes that are too loose, can lead to falls.  Take vitamin D pills which might lower the risk of falls in older people. This is because vitamin D helps make bones and muscles stronger.  Use a cane, walker, and other safety devices as these devices help you avoid falling, too. These include grab bars or a sturdy seat for the shower, non-slip bath mats, and hand rails or treads for the stairs (to prevent slipping). If you worry that you could fall, there are also alarm buttons that let you call for help if you fall and can't get up.   It is important to tell your doctor about any times you have fallen or almost fallen.  Seek immediate help for pain or injury after a fall.    Diagnosis: Non-traumatic subdural hematoma (SDH)  Assessment and Plan of Treatment: A subdural hematoma is a buildup of blood between the layers of tissue that cover the brain. The blood collects under the layer closest to the skull.  You may not need treatment if you have a small hematoma that is not causing symptoms.  Do not drink alcohol until your doctor says it is okay.  Don't drive a car, ride a bike, or operate machinery until your doctor says it's okay.  Call 911 anytime you think you may need emergency care. For example, call if:  You have a seizure.  You passed out (lost consciousness).  You are confused or can't stay awake.  Call your doctor now or seek immediate medical care if:  You have new or worse vomiting.  You feel less alert.  You have new weakness or numbness in any part of your body.  You have a headache that is getting worse.  Watch closely for changes in your health, and be sure to contact your doctor if:  You do not get better as expected.  You have new symptoms, such as headaches, trouble concentrating, or changes in mood.

## 2025-06-16 NOTE — SWALLOW BEDSIDE ASSESSMENT ADULT - COMMENTS
Cardiology c/s -> . Repeat LE venous duplex on 6/15 without DVT noted. Study improved. Pulm c/s for cough and atelectasis on imaging -> Hypoxemia likely secondary to a combination of PE and atelectasis.   Infectious work up ngtd. On workup was found to have bilateral pulmonary thromboemboli and a 1.7CM JS Lung nodule which is indeterminant. Differential diagnoses includes granuloma, mucus plug versus a primary lung neoplasm. F/u CT Chest in 8 weeks. Neurosurg c/s c/f new R facial droop ->CTH done showing small left frontal parafalcine subdural hemorrhage. Pt states R facial droop is old. Long interval CTH reviewed with stable findings. from neurosurgical perspective, would recommend IVC filter if LE dvt is source of PE. Repeat CT head on 6/12 read as decreased SDH. Now on 2L NC spo2 93% (down from 5), continue to wean as tolerated, cough improving.     OTHER IMAGING: CXR 6/11: Increased aeration of the right lung when compared to prior. Blateral linear opacities, likely atelectasis.    *New to this service.* Cardiology c/s -> . Repeat LE venous duplex on 6/15 without DVT noted. Study improved. Pulm c/s for cough and atelectasis on imaging -> Hypoxemia likely secondary to a combination of PE and atelectasis. Infectious work up ngtd. On workup was found to have bilateral pulmonary thromboemboli and a 1.7CM JS Lung nodule which is indeterminant. Differential diagnoses includes granuloma, mucus plug versus a primary lung neoplasm. F/u CT Chest in 8 weeks. Neurosurg c/s c/f new R facial droop ->CTH done showing small left frontal parafalcine subdural hemorrhage. Pt states R facial droop is old. Long interval CTH reviewed with stable findings. from neurosurgical perspective, would recommend IVC filter if LE dvt is source of PE. Repeat CT head on 6/12 read as decreased SDH. Now on 2L NC spo2 93% (down from 5), continue to wean as tolerated, cough improving.     OTHER IMAGING: CXR 6/11: Increased aeration of the right lung when compared to prior. Blateral linear opacities, likely atelectasis.    *New to this service.* Cardiology c/s -> . Repeat LE venous duplex on 6/15 without DVT noted. Study improved. Pulm c/s for cough and atelectasis on imaging -> Hypoxemia likely secondary to a combination of PE and atelectasis. Infectious work up ngtd. On workup was found to have bilateral pulmonary thromboemboli and a 1.7CM JS Lung nodule which is indeterminant. Differential diagnoses includes granuloma, mucus plug versus a primary lung neoplasm. F/u CT Chest in 8 weeks. Neurosurg c/s c/f new R facial droop ->CTH done showing small left frontal parafalcine subdural hemorrhage. Pt states R facial droop is old. Long interval CTH reviewed with stable findings. from neurosurgical perspective, would recommend IVC filter if LE dvt is source of PE. Repeat CT head on 6/12 read as decreased SDH. Now on 2L NC spo2 93% (down from 5), continue to wean as tolerated, cough improving.     OTHER IMAGING: CXR 6/11: Increased aeration of the right lung when compared to prior. Blateral linear opacities, likely atelectasis.    *New to this service.* D/w chiki on phone. Reports no prior hx of dysphagia or SLP intervention. No known pna. Notes occasional coughing with thin liquids via straw as of recent.

## 2025-06-16 NOTE — SWALLOW BEDSIDE ASSESSMENT ADULT - H & P REVIEW
yes 85 y/o M with pmhx of HTN, HLD, CAD with stents on DAPT, CHF presenting from the [facility] with falls. Per the granddaughter, patient has had multiple falls over the past few months (usually from sliding out of bed). Over the course of the past week, patient has had a new cough and resultant weakness and unsteadiness. The day prior to presentation, patient had a fall while ambulating with a walker. Family saw the patient that day and noted no new symptoms (f/chills/SOB/dysuria/worsened LE edema). After they left he had 2 more falls so presented to the ED. In the ED afeb hds, labs notable for Cr 1.8 (unclear baseline) proBNP 2933. UA negative. CXR without infiltrate. Duplex with RLE DVT. started on hep gtt. CTA chest 6/2 showed bilateral pulmonary thromboemboli, most proximally in the right interlobar pulmonary artery. No evidence of right heart strain. R LE venous duplex 5/31 showed right popliteal vein DVT.  Patient noted to have coughing and congestion during bedside exam. No chest pain. Mild LE edema./yes

## 2025-06-16 NOTE — PROGRESS NOTE ADULT - SUBJECTIVE AND OBJECTIVE BOX
[facility] Division of Hospital Medicine  Bridger High MD  Available on Teams Tyesha    Patient is a 84y old  Male who presents with a chief complaint of weakness and DVT (16 Jun 2025 11:51)      SUBJECTIVE / OVERNIGHT EVENTS:  Patient seen and assessed at bedside, with no acute events overnight. on 2L NC this AM, with intermittent cough but denies any other complaints at this time.    ADDITIONAL REVIEW OF SYSTEMS: negative    MEDICATIONS  (STANDING):  albuterol/ipratropium for Nebulization 3 milliLiter(s) Nebulizer every 6 hours  allopurinol 100 milliGRAM(s) Oral daily  enoxaparin Injectable 60 milliGRAM(s) SubCutaneous every 12 hours  escitalopram 10 milliGRAM(s) Oral daily  guaiFENesin  milliGRAM(s) Oral every 12 hours  linaclotide 72 MICROGram(s) Oral before breakfast  metoprolol succinate ER 25 milliGRAM(s) Oral daily  mirabegron ER 25 milliGRAM(s) Oral daily  rosuvastatin 10 milliGRAM(s) Oral at bedtime  sodium chloride 3%  Inhalation 4 milliLiter(s) Inhalation every 6 hours  spironolactone 25 milliGRAM(s) Oral daily  torsemide 20 milliGRAM(s) Oral daily    MEDICATIONS  (PRN):  acetylcysteine 10%  Inhalation 4 milliLiter(s) Inhalation every 4 hours PRN increased secretions  guaiFENesin Oral Liquid (Sugar-Free) 100 milliGRAM(s) Oral every 6 hours PRN Cough  sodium chloride 0.65% Nasal 1 Spray(s) Both Nostrils three times a day PRN Nasal Congestion      CAPILLARY BLOOD GLUCOSE        I&O's Summary    15 Jean 2025 07:01  -  16 Jun 2025 07:00  --------------------------------------------------------  IN: 600 mL / OUT: 150 mL / NET: 450 mL        PHYSICAL EXAM:  Vital Signs Last 24 Hrs  T(C): 37.1 (16 Jun 2025 12:26), Max: 37.1 (16 Jun 2025 12:26)  T(F): 98.7 (16 Jun 2025 12:26), Max: 98.7 (16 Jun 2025 12:26)  HR: 56 (16 Jun 2025 12:26) (55 - 79)  BP: 105/60 (16 Jun 2025 12:26) (105/60 - 155/78)  BP(mean): --  RR: 18 (16 Jun 2025 12:26) (16 - 18)  SpO2: 95% (16 Jun 2025 12:26) (93% - 97%)    Parameters below as of 16 Jun 2025 12:26  Patient On (Oxygen Delivery Method): nasal cannula  O2 Flow (L/min): 2      CONSTITUTIONAL: Well-groomed, in no apparent distress  EYES: No conjunctival or scleral injection  ENMT: No external nasal lesions; oral mucosa with moist membranes  RESPIRATORY: Breathing comfortably; lungs CTA without wheeze/rales  CARDIOVASCULAR: +S1S2, RRR, no M/G/R; pedal pulses full and symmetric  GASTROINTESTINAL: No palpable tenderness, +BS throughout, no rebound/guarding  MUSCULOSKELETAL: no digital clubbing or cyanosis  SKIN: No rashes or ulcers noted  NEUROLOGIC: mild R sided facial droop (baseline), no new FND  PSYCHIATRIC: A+O x 3    LABS:                        14.9   8.00  )-----------( 315      ( 15 Jean 2025 10:01 )             42.7     06-16    135  |  95[L]  |  27[H]  ----------------------------<  104[H]  3.6   |  26  |  1.26    Ca    9.4      16 Jun 2025 06:55            Urinalysis Basic - ( 16 Jun 2025 06:55 )    Color: x / Appearance: x / SG: x / pH: x  Gluc: 104 mg/dL / Ketone: x  / Bili: x / Urobili: x   Blood: x / Protein: x / Nitrite: x   Leuk Esterase: x / RBC: x / WBC x   Sq Epi: x / Non Sq Epi: x / Bacteria: x          RADIOLOGY & ADDITIONAL TESTS:  Results Reviewed:   Imaging Personally Reviewed:  Electrocardiogram Personally Reviewed:    COORDINATION OF CARE:  Care Discussed with Consultants/Other Providers [Y/N]:  Prior or Outpatient Records Reviewed [Y/N]:

## 2025-06-16 NOTE — DISCHARGE NOTE PROVIDER - NSDCMRMEDTOKEN_GEN_ALL_CORE_FT
allopurinol 100 mg oral tablet: orally once a day  aspirin 81 mg oral tablet: orally once a day  clopidogrel 75 mg oral tablet: 1 tab(s) orally once a day  Eliquis Starter Pack for Treatment of DVT and PE 5 mg oral tablet: 1 tab(s) orally 2 times a day Please take 2 tablets (10mg) TWICE a day for 7 days, and then 1 tablet (5mg) TWICE a day thereafter  gabapentin 600 mg oral tablet: 1 tab(s) orally every 8 hours  Lexapro 10 mg oral tablet: 1 tab(s) orally once a day  Linzess 72 mcg oral capsule: 1 cap(s) orally once a day  Metoprolol Succinate ER 25 mg oral tablet, extended release: 1 tab(s) orally once a day  mirabegron 25 mg oral tablet, extended release: 1 tab(s) orally once a day  rosuvastatin 10 mg oral capsule: 1 cap(s) orally once a day  spironolactone 25 mg oral tablet: 1 tab(s) orally once a day  torsemide 40 mg oral tablet: 1 tab(s) orally once a day   acetylcysteine 10% inhalation solution: 4 milliliter(s) inhaled every 4 hours As needed increased secretions  allopurinol 100 mg oral tablet: orally once a day  aspirin 81 mg oral tablet: orally once a day  enoxaparin: 60 milligram(s) subcutaneous every 12 hours  gabapentin 600 mg oral tablet: 1 tab(s) orally once a day  guaiFENesin 100 mg/5 mL oral liquid: 5 milliliter(s) orally every 6 hours As needed Cough  guaiFENesin 600 mg oral tablet, extended release: 1 tab(s) orally every 12 hours  ipratropium-albuterol 0.5 mg-2.5 mg/3 mL inhalation solution: 3 milliliter(s) inhaled every 6 hours  Lexapro 10 mg oral tablet: 1 tab(s) orally once a day  Linzess 72 mcg oral capsule: 1 cap(s) orally once a day  Metoprolol Succinate ER 25 mg oral tablet, extended release: 1 tab(s) orally once a day  mirabegron 25 mg oral tablet, extended release: 1 tab(s) orally once a day  rosuvastatin 10 mg oral capsule: 1 cap(s) orally once a day  sodium chloride 3% inhalation solution: 4 milliliter(s) inhaled every 6 hours  spironolactone 25 mg oral tablet: 1 tab(s) orally once a day  torsemide 40 mg oral tablet: 1 tab(s) orally once a day   acetylcysteine 10% inhalation solution: 4 milliliter(s) inhaled every 4 hours As needed increased secretions  allopurinol 100 mg oral tablet: orally once a day  enoxaparin: 60 milligram(s) subcutaneous every 12 hours  gabapentin 600 mg oral tablet: 1 tab(s) orally once a day  guaiFENesin 100 mg/5 mL oral liquid: 5 milliliter(s) orally every 6 hours As needed Cough  guaiFENesin 600 mg oral tablet, extended release: 1 tab(s) orally every 12 hours  ipratropium-albuterol 0.5 mg-2.5 mg/3 mL inhalation solution: 3 milliliter(s) inhaled every 6 hours  Lexapro 10 mg oral tablet: 1 tab(s) orally once a day  Linzess 72 mcg oral capsule: 1 cap(s) orally once a day  Metoprolol Succinate ER 25 mg oral tablet, extended release: 1 tab(s) orally once a day  mirabegron 25 mg oral tablet, extended release: 1 tab(s) orally once a day  rosuvastatin 10 mg oral capsule: 1 cap(s) orally once a day  sodium chloride 3% inhalation solution: 4 milliliter(s) inhaled every 6 hours  spironolactone 25 mg oral tablet: 1 tab(s) orally once a day  torsemide 40 mg oral tablet: 1 tab(s) orally once a day   acetylcysteine 10% inhalation solution: 4 milliliter(s) inhaled every 4 hours As needed increased secretions  allopurinol 100 mg oral tablet: orally once a day  enoxaparin: 60 milligram(s) subcutaneous every 12 hours  guaiFENesin 100 mg/5 mL oral liquid: 5 milliliter(s) orally every 6 hours As needed Cough  guaiFENesin 600 mg oral tablet, extended release: 1 tab(s) orally every 12 hours  ipratropium-albuterol 0.5 mg-2.5 mg/3 mL inhalation solution: 3 milliliter(s) inhaled every 6 hours  Lexapro 10 mg oral tablet: 1 tab(s) orally once a day  Linzess 72 mcg oral capsule: 1 cap(s) orally once a day  Metoprolol Succinate ER 25 mg oral tablet, extended release: 1 tab(s) orally once a day  mirabegron 25 mg oral tablet, extended release: 1 tab(s) orally once a day  rosuvastatin 10 mg oral capsule: 1 cap(s) orally once a day  sodium chloride 3% inhalation solution: 4 milliliter(s) inhaled every 6 hours  spironolactone 25 mg oral tablet: 1 tab(s) orally once a day  torsemide 40 mg oral tablet: 1 tab(s) orally once a day

## 2025-06-17 PROCEDURE — 99232 SBSQ HOSP IP/OBS MODERATE 35: CPT

## 2025-06-17 RX ORDER — ENOXAPARIN SODIUM 100 MG/ML
60 INJECTION SUBCUTANEOUS
Qty: 0 | Refills: 0 | DISCHARGE
Start: 2025-06-17

## 2025-06-17 RX ORDER — GABAPENTIN 400 MG/1
300 CAPSULE ORAL DAILY
Refills: 0 | Status: DISCONTINUED | OUTPATIENT
Start: 2025-06-17 | End: 2025-06-19

## 2025-06-17 RX ORDER — DEXTROMETHORPHAN HBR, GUAIFENESIN 200 MG/10ML
5 LIQUID ORAL
Qty: 0 | Refills: 0 | DISCHARGE
Start: 2025-06-17

## 2025-06-17 RX ORDER — DEXTROMETHORPHAN HBR, GUAIFENESIN 200 MG/10ML
1 LIQUID ORAL
Qty: 0 | Refills: 0 | DISCHARGE
Start: 2025-06-17

## 2025-06-17 RX ORDER — CLOPIDOGREL BISULFATE 75 MG/1
1 TABLET, FILM COATED ORAL
Refills: 0 | DISCHARGE

## 2025-06-17 RX ORDER — IPRATROPIUM BROMIDE AND ALBUTEROL SULFATE .5; 2.5 MG/3ML; MG/3ML
3 SOLUTION RESPIRATORY (INHALATION)
Qty: 0 | Refills: 0 | DISCHARGE
Start: 2025-06-17

## 2025-06-17 RX ORDER — ACETYLCYSTEINE 200 MG/ML
4 INHALANT RESPIRATORY (INHALATION)
Qty: 0 | Refills: 0 | DISCHARGE
Start: 2025-06-17

## 2025-06-17 RX ORDER — ASPIRIN 325 MG
0 TABLET ORAL
Refills: 0 | DISCHARGE

## 2025-06-17 RX ADMIN — DEXTROMETHORPHAN HBR, GUAIFENESIN 600 MILLIGRAM(S): 200 LIQUID ORAL at 17:05

## 2025-06-17 RX ADMIN — DEXTROMETHORPHAN HBR, GUAIFENESIN 600 MILLIGRAM(S): 200 LIQUID ORAL at 05:54

## 2025-06-17 RX ADMIN — ROSUVASTATIN CALCIUM 10 MILLIGRAM(S): 20 TABLET, FILM COATED ORAL at 22:03

## 2025-06-17 RX ADMIN — IPRATROPIUM BROMIDE AND ALBUTEROL SULFATE 3 MILLILITER(S): .5; 2.5 SOLUTION RESPIRATORY (INHALATION) at 05:54

## 2025-06-17 RX ADMIN — ENOXAPARIN SODIUM 60 MILLIGRAM(S): 100 INJECTION SUBCUTANEOUS at 17:05

## 2025-06-17 RX ADMIN — Medication 20 MILLIGRAM(S): at 05:54

## 2025-06-17 RX ADMIN — MIRABEGRON 25 MILLIGRAM(S): 50 TABLET, FILM COATED, EXTENDED RELEASE ORAL at 11:57

## 2025-06-17 RX ADMIN — ESCITALOPRAM OXALATE 10 MILLIGRAM(S): 20 TABLET ORAL at 11:57

## 2025-06-17 RX ADMIN — Medication 4 MILLILITER(S): at 00:17

## 2025-06-17 RX ADMIN — Medication 25 MILLIGRAM(S): at 05:54

## 2025-06-17 RX ADMIN — Medication 100 MILLIGRAM(S): at 11:59

## 2025-06-17 RX ADMIN — ENOXAPARIN SODIUM 60 MILLIGRAM(S): 100 INJECTION SUBCUTANEOUS at 05:53

## 2025-06-17 RX ADMIN — IPRATROPIUM BROMIDE AND ALBUTEROL SULFATE 3 MILLILITER(S): .5; 2.5 SOLUTION RESPIRATORY (INHALATION) at 17:06

## 2025-06-17 RX ADMIN — IPRATROPIUM BROMIDE AND ALBUTEROL SULFATE 3 MILLILITER(S): .5; 2.5 SOLUTION RESPIRATORY (INHALATION) at 11:56

## 2025-06-17 RX ADMIN — IPRATROPIUM BROMIDE AND ALBUTEROL SULFATE 3 MILLILITER(S): .5; 2.5 SOLUTION RESPIRATORY (INHALATION) at 00:17

## 2025-06-17 RX ADMIN — METOPROLOL SUCCINATE 25 MILLIGRAM(S): 50 TABLET, EXTENDED RELEASE ORAL at 05:54

## 2025-06-17 RX ADMIN — LINACLOTIDE 72 MICROGRAM(S): 290 CAPSULE, GELATIN COATED ORAL at 05:54

## 2025-06-17 RX ADMIN — Medication 4 MILLILITER(S): at 11:56

## 2025-06-17 RX ADMIN — Medication 4 MILLILITER(S): at 05:54

## 2025-06-17 RX ADMIN — Medication 4 MILLILITER(S): at 17:06

## 2025-06-17 NOTE — PROGRESS NOTE ADULT - SUBJECTIVE AND OBJECTIVE BOX
Vascular Cardiology Progress Note     DIRECT PROVIDER NUMBER: [phone]  / Available on TEAMS    CC:  weakness and DVT    Interval Events:  Reports breathing improved.  Reduced cough.   Remains on Lovenox 60 mg BID.   Repeat TTE  showed: No obvious thrombus see on imaages obtained.      MEDICATIONS  (STANDING):  albuterol/ipratropium for Nebulization 3 milliLiter(s) Nebulizer every 6 hours  allopurinol 100 milliGRAM(s) Oral daily  enoxaparin Injectable 60 milliGRAM(s) SubCutaneous every 12 hours  escitalopram 10 milliGRAM(s) Oral daily  guaiFENesin  milliGRAM(s) Oral every 12 hours  linaclotide 72 MICROGram(s) Oral before breakfast  metoprolol succinate ER 25 milliGRAM(s) Oral daily  mirabegron ER 25 milliGRAM(s) Oral daily  rosuvastatin 10 milliGRAM(s) Oral at bedtime  sodium chloride 3%  Inhalation 4 milliLiter(s) Inhalation every 6 hours  spironolactone 25 milliGRAM(s) Oral daily  torsemide 20 milliGRAM(s) Oral daily      PAST MEDICAL & SURGICAL HISTORY:  Chronic diastolic congestive heart failure  CAD (coronary artery disease)  No significant past surgical history    FAMILY HISTORY:  No pertinent family history in first degree relatives    SOCIAL HISTORY:  unchanged    REVIEW OF SYSTEMS:  CONSTITUTIONAL: No fever  EYES: No eye pain  ENT:  No throat pain  NECK: No pain   RESPIRATORY: SOB on admission    CARDIOVASCULAR:  No C/P  GASTROINTESTINAL: No abdominal pain  GENITOURINARY: No hematuria  SKIN: No LE wounds  LYMPH Nodes: No enlarged glands noted  ENDOCRINE: No heat or cold intolerance noted  MUSCULOSKELETAL:  Decreased LE edema  PSYCHIATRIC: No depression, anxiety  HEME/LYMPH: No bleeding gums  ALLERGY AND IMMUNOLOGIC: No hives    [ x] All others negative	    Vital Signs Last 24 Hrs  Vital Signs Last 24 Hrs  T(C): 36.8 (2025 11:11), Max: 37.1 (2025 12:26)  T(F): 98.3 (2025 11:11), Max: 98.7 (2025 12:26)  HR: 60 (2025 11:11) (56 - 76)  BP: 119/79 (2025 11:11) (105/60 - 119/79)  BP(mean): --  RR: 18 (2025 11:11) (18 - 18)  SpO2: 96% (2025 11:11) (95% - 98%)    Parameters below as of 2025 11:11  Patient On (Oxygen Delivery Method): nasal cannula  O2 Flow (L/min): 2    Appearance: NAD  Cardiovascular: RRR, S1 and S2  Respiratory: Decreased BS Bases  Gastrointestinal:  Soft, Non-tender, + BS	  Skin: No cyanosis	  Extremities:  LE edema, bilateral calves soft   Foot Exam: No tissue loss               Labs:        135  |  95[L]  |  27[H]  ----------------------------<  104[H]  3.6   |  26  |  1.26    Ca    9.4      2025 06:55    Urinalysis Basic - ( 2025 06:55 )    Color: x / Appearance: x / SG: x / pH: x  Gluc: 104 mg/dL / Ketone: x  / Bili: x / Urobili: x   Blood: x / Protein: x / Nitrite: x   Leuk Esterase: x / RBC: x / WBC x   Sq Epi: x / Non Sq Epi: x / Bacteria: x        Assessment:  1. Bilateral PE  Lobar and Segmental PE  2. R LE DVT  R popliteal DVT (resolved)  3. HFpEF  4. Cough  5. CAD s/p PCI  6. HTN  7. History of Falls  8. EF 46%  9. SDH  10. LV thrombus    Plan:  1. Transitioned to Lovenox 60 mg BID  (70% of therapeutic dose) on 6/10. Recommend to continue.   Dose modification recommended in setting of SDH. Paired with surveillance TTE and LE venous duplex.  2. Appreciate Neurosurgical evaluation.  3. Repeat CT head on 12 for surveillance of SDH, read as smaller and with no new hemorrhage.  4. Repeat LE venous duplex on 6/15 without DVT noted. Study improved.  5. Repeat TTE on  showed no obvious thrombus see on imaages obtained.  6. Creatinine improving . Volume status stable. Continue Torsemide 20 mg daily.  7. Pneumatic compression to L lower extremity.  8. Patient is stable for transfer to [facility], from a Vascular Cardiology perspective.   9. Patient should also have outpatient Neurosurgical follow-up. Our office will arrange Vascular Cardiology follow-up.   10. Recommend repeat surveillance LE venous duplex in 1-2 weeks.  11. Recommend repeat TTE in 1 month for continued surveillance, given previously noted LV thrombus.   12. Appreciate excellent care by  Medicine Team.     Thank you  STEVEN Bryant, MS, [facility]  Vascular Cardiology Service    Please call with any questions:   DIRECT SERVICE NUMBER: [phone] / Available on TEAMS

## 2025-06-17 NOTE — PROGRESS NOTE ADULT - SUBJECTIVE AND OBJECTIVE BOX
[facility] Division of Hospital Medicine  Bridger High MD  Available on Teams Tyesha    Patient is a 84y old  Male who presents with a chief complaint of weakness and DVT (17 Jun 2025 11:52)      SUBJECTIVE / OVERNIGHT EVENTS:  Pt seen and evaluated at bedside, with no acute overnight events. Notes cough is improved, denies any CP, or any other acute concerns. Had a BM this am.     ADDITIONAL REVIEW OF SYSTEMS: negative    MEDICATIONS  (STANDING):  albuterol/ipratropium for Nebulization 3 milliLiter(s) Nebulizer every 6 hours  allopurinol 100 milliGRAM(s) Oral daily  enoxaparin Injectable 60 milliGRAM(s) SubCutaneous every 12 hours  escitalopram 10 milliGRAM(s) Oral daily  gabapentin 300 milliGRAM(s) Oral daily  guaiFENesin  milliGRAM(s) Oral every 12 hours  linaclotide 72 MICROGram(s) Oral before breakfast  metoprolol succinate ER 25 milliGRAM(s) Oral daily  mirabegron ER 25 milliGRAM(s) Oral daily  rosuvastatin 10 milliGRAM(s) Oral at bedtime  sodium chloride 3%  Inhalation 4 milliLiter(s) Inhalation every 6 hours  spironolactone 25 milliGRAM(s) Oral daily  torsemide 20 milliGRAM(s) Oral daily    MEDICATIONS  (PRN):  acetylcysteine 10%  Inhalation 4 milliLiter(s) Inhalation every 4 hours PRN increased secretions  guaiFENesin Oral Liquid (Sugar-Free) 100 milliGRAM(s) Oral every 6 hours PRN Cough  sodium chloride 0.65% Nasal 1 Spray(s) Both Nostrils three times a day PRN Nasal Congestion      CAPILLARY BLOOD GLUCOSE        I&O's Summary    16 Jun 2025 07:01  -  17 Jun 2025 07:00  --------------------------------------------------------  IN: 360 mL / OUT: 0 mL / NET: 360 mL        PHYSICAL EXAM:  Vital Signs Last 24 Hrs  T(C): 36.8 (17 Jun 2025 11:11), Max: 36.8 (16 Jun 2025 17:42)  T(F): 98.3 (17 Jun 2025 11:11), Max: 98.3 (16 Jun 2025 17:42)  HR: 68 (17 Jun 2025 13:53) (60 - 76)  BP: 117/81 (17 Jun 2025 13:53) (112/74 - 119/79)  BP(mean): --  RR: 18 (17 Jun 2025 13:53) (18 - 18)  SpO2: 97% (17 Jun 2025 13:53) (95% - 98%)    Parameters below as of 17 Jun 2025 13:53  Patient On (Oxygen Delivery Method): nasal cannula  O2 Flow (L/min): 2      CONSTITUTIONAL: Well-groomed, in no apparent distress  EYES: No conjunctival or scleral injection  ENMT: No external nasal lesions; oral mucosa with moist membranes  RESPIRATORY: Breathing comfortably; lungs CTA without wheeze/rales  CARDIOVASCULAR: +S1S2, RRR, no M/G/R; pedal pulses full and symmetric, no edema  GASTROINTESTINAL: No palpable tenderness, +BS throughout, no rebound/guarding  MUSCULOSKELETAL: no digital clubbing or cyanosis  SKIN: No rashes or ulcers noted  NEUROLOGIC: mild R sided facial droop (baseline), no new FND  PSYCHIATRIC: A+O x 3      LABS:    06-16    135  |  95[L]  |  27[H]  ----------------------------<  104[H]  3.6   |  26  |  1.26    Ca    9.4      16 Jun 2025 06:55            Urinalysis Basic - ( 16 Jun 2025 06:55 )    Color: x / Appearance: x / SG: x / pH: x  Gluc: 104 mg/dL / Ketone: x  / Bili: x / Urobili: x   Blood: x / Protein: x / Nitrite: x   Leuk Esterase: x / RBC: x / WBC x   Sq Epi: x / Non Sq Epi: x / Bacteria: x          RADIOLOGY & ADDITIONAL TESTS:  Results Reviewed:   Imaging Personally Reviewed:  Electrocardiogram Personally Reviewed:    COORDINATION OF CARE:  Care Discussed with Consultants/Other Providers [Y/N]:  Prior or Outpatient Records Reviewed [Y/N]:

## 2025-06-18 PROCEDURE — 99232 SBSQ HOSP IP/OBS MODERATE 35: CPT

## 2025-06-18 RX ADMIN — DEXTROMETHORPHAN HBR, GUAIFENESIN 600 MILLIGRAM(S): 200 LIQUID ORAL at 17:28

## 2025-06-18 RX ADMIN — MIRABEGRON 25 MILLIGRAM(S): 50 TABLET, FILM COATED, EXTENDED RELEASE ORAL at 12:24

## 2025-06-18 RX ADMIN — Medication 4 MILLILITER(S): at 12:25

## 2025-06-18 RX ADMIN — ROSUVASTATIN CALCIUM 10 MILLIGRAM(S): 20 TABLET, FILM COATED ORAL at 22:33

## 2025-06-18 RX ADMIN — Medication 4 MILLILITER(S): at 00:20

## 2025-06-18 RX ADMIN — IPRATROPIUM BROMIDE AND ALBUTEROL SULFATE 3 MILLILITER(S): .5; 2.5 SOLUTION RESPIRATORY (INHALATION) at 12:25

## 2025-06-18 RX ADMIN — LINACLOTIDE 72 MICROGRAM(S): 290 CAPSULE, GELATIN COATED ORAL at 06:25

## 2025-06-18 RX ADMIN — ENOXAPARIN SODIUM 60 MILLIGRAM(S): 100 INJECTION SUBCUTANEOUS at 17:28

## 2025-06-18 RX ADMIN — Medication 4 MILLILITER(S): at 05:38

## 2025-06-18 RX ADMIN — Medication 100 MILLIGRAM(S): at 12:24

## 2025-06-18 RX ADMIN — DEXTROMETHORPHAN HBR, GUAIFENESIN 600 MILLIGRAM(S): 200 LIQUID ORAL at 05:39

## 2025-06-18 RX ADMIN — ENOXAPARIN SODIUM 60 MILLIGRAM(S): 100 INJECTION SUBCUTANEOUS at 05:38

## 2025-06-18 RX ADMIN — IPRATROPIUM BROMIDE AND ALBUTEROL SULFATE 3 MILLILITER(S): .5; 2.5 SOLUTION RESPIRATORY (INHALATION) at 05:38

## 2025-06-18 RX ADMIN — GABAPENTIN 300 MILLIGRAM(S): 400 CAPSULE ORAL at 12:25

## 2025-06-18 RX ADMIN — ESCITALOPRAM OXALATE 10 MILLIGRAM(S): 20 TABLET ORAL at 12:24

## 2025-06-18 RX ADMIN — IPRATROPIUM BROMIDE AND ALBUTEROL SULFATE 3 MILLILITER(S): .5; 2.5 SOLUTION RESPIRATORY (INHALATION) at 00:20

## 2025-06-18 RX ADMIN — Medication 4 MILLILITER(S): at 17:28

## 2025-06-18 RX ADMIN — Medication 25 MILLIGRAM(S): at 05:38

## 2025-06-18 RX ADMIN — Medication 20 MILLIGRAM(S): at 05:38

## 2025-06-18 RX ADMIN — IPRATROPIUM BROMIDE AND ALBUTEROL SULFATE 3 MILLILITER(S): .5; 2.5 SOLUTION RESPIRATORY (INHALATION) at 17:28

## 2025-06-18 RX ADMIN — METOPROLOL SUCCINATE 25 MILLIGRAM(S): 50 TABLET, EXTENDED RELEASE ORAL at 05:39

## 2025-06-18 NOTE — PROGRESS NOTE ADULT - ASSESSMENT
85 y/o M with past medical history of HTN, HLD, CAD with stents on DAPT, CHF presenting from the [facility] with recurrent falls, found to have new RLE DVT. 
  -CTH in 4-6 hours  -hold plavix and heparin for now  -rpt 24hour CTH, if stable then can discuss restarting AC for PEs  -hold plavix for now, stent was placed 10 years ago
83 y/o M w/CAD and CHF w/unknown EF admitted for multiple falls found to be hypoxemic with DVT/PE and bibasilar atelectasis. Hypoxemia likely secondary to a combination of PE and atelectasis.    # Hypoxemia  # Acute PE  # Atelectasis    - Supplemental O2 as needed goal O2 sat >= 90%  - Airway clearance, acapella device, hypertonic saline, duonebs  - Therapeutic AC, follow up TTE  - Incentive spirometry, OOB as tolerated
83 y/o M with past medical history of HTN, HLD, CAD with stents on DAPT, CHF presenting from the [facility] with recurrent falls, found to have new RLE DVT. 
85 y/o M with past medical history of HTN, HLD, CAD with stents on DAPT, CHF presenting from the [facility] with recurrent falls, found to have new RLE DVT. 
Patient is an 85 yo M w/ HTN, HLD, CAD w/ Stents, CHF who p/w falls found to have bilateral PE, R pop DVT as well as L frontal parafalcine SDH    #Acute hypoxemic respiratory failure  #PE  #DVT  #Atelectasis  #Abnormal CT chets  - c/w supplemental O2, wean as tolerated  - c/w Hep gtt  - Transition to DOAC and discussion re possible IVC filter as per vascular cardiology/neurosurgery  - Encourage incentive spirometer, OOB, ambulation as tolerated    Pulmonary to sign off, please call back if any questions    Chato Green MD  Pulmonary & Critical Care  
83 y/o M with past medical history of HTN, HLD, CAD with stents on DAPT, CHF presenting from the [facility] with recurrent falls, found to have new RLE DVT. 
85 y/o M with past medical history of HTN, HLD, CAD with stents on DAPT, CHF presenting from the [facility] with recurrent falls, found to have new RLE DVT. 
85 y/o M with past medical history of HTN, HLD, CAD with stents on DAPT, CHF presenting from the [facility] with recurrent falls, found to have new RLE DVT. 
83 y/o M with past medical history of HTN, HLD, CAD with stents on DAPT, CHF presenting from the [facility] with recurrent falls, found to have new RLE DVT. 
85 y/o M with past medical history of HTN, HLD, CAD with stents on DAPT, CHF presenting from the [facility] with recurrent falls, found to have new RLE DVT. 
85 y/o M with past medical history of HTN, HLD, CAD with stents on DAPT, CHF presenting from the [facility] with recurrent falls, found to have new RLE DVT. 
85 y/o M with past medical history of HTN, HLD, CAD with stents on DAPT, CHF presenting from the [facility] with recurrent falls, found to have new RLE DVT. 
85 y/o M with past medical history of HTN, HLD, CAD with stents on DAPT, CHF presenting from the [facility] with recurrent falls, found to have new RLE DVT. 
85 y/o M with past medical history of HTN, HLD, CAD with stents on DAPT, CHF presenting from the [facility] with recurrent falls, found to have new RLE DVT. 
83 y/o M with past medical history of HTN, HLD, CAD with stents on DAPT, CHF presenting from the [facility] with recurrent falls, found to have new RLE DVT. 
83 y/o M with past medical history of HTN, HLD, CAD with stents on DAPT, CHF presenting from the [facility] with recurrent falls, found to have new RLE DVT. 
83 y/o M with past medical history of HTN, HLD, CAD with stents on DAPT, CHF presenting from the [facility] with recurrent falls, found to have new RLE DVT. 
85 y/o M with past medical history of HTN, HLD, CAD with stents on DAPT, CHF presenting from the [facility] with recurrent falls, found to have new RLE DVT. 
83 y/o M with past medical history of HTN, HLD, CAD with stents on DAPT, CHF presenting from the [facility] with recurrent falls, found to have new RLE DVT. 
83 y/o M with past medical history of HTN, HLD, CAD with stents on DAPT, CHF presenting from the [facility] with recurrent falls, found to have new RLE DVT.

## 2025-06-18 NOTE — DIETITIAN INITIAL EVALUATION ADULT - PERTINENT MEDS FT
MEDICATIONS  (STANDING):  albuterol/ipratropium for Nebulization 3 milliLiter(s) Nebulizer every 6 hours  allopurinol 100 milliGRAM(s) Oral daily  enoxaparin Injectable 60 milliGRAM(s) SubCutaneous every 12 hours  escitalopram 10 milliGRAM(s) Oral daily  gabapentin 300 milliGRAM(s) Oral daily  guaiFENesin  milliGRAM(s) Oral every 12 hours  linaclotide 72 MICROGram(s) Oral before breakfast  metoprolol succinate ER 25 milliGRAM(s) Oral daily  mirabegron ER 25 milliGRAM(s) Oral daily  rosuvastatin 10 milliGRAM(s) Oral at bedtime  sodium chloride 3%  Inhalation 4 milliLiter(s) Inhalation every 6 hours  spironolactone 25 milliGRAM(s) Oral daily  torsemide 20 milliGRAM(s) Oral daily    MEDICATIONS  (PRN):  acetylcysteine 10%  Inhalation 4 milliLiter(s) Inhalation every 4 hours PRN increased secretions  guaiFENesin Oral Liquid (Sugar-Free) 100 milliGRAM(s) Oral every 6 hours PRN Cough  sodium chloride 0.65% Nasal 1 Spray(s) Both Nostrils three times a day PRN Nasal Congestion

## 2025-06-18 NOTE — DIETITIAN INITIAL EVALUATION ADULT - LITERATURE/VIDEOS GIVEN
Patient not feeling well during RD visit. Diet education not appropriate/ warranted at this time. RD remains available to provide diet education as indicated.

## 2025-06-18 NOTE — DIETITIAN INITIAL EVALUATION ADULT - PHYSCIAL ASSESSMENT
- Per [facility] HIE: 232lbs (11/19/2024), 229lbs (5/30/2025), 198lbs (6/10/2025). 13.5% weight loss in < two weeks indicated, question accuracy.     - Per chart, patient's current dosing weight 198lbs (6/10/2025). Patient denies dosing weight, and reports usual and current weight to be 220lbs. Patient denies unintentional weight loss at this time.    - Question dosing weight vs patient's weight hx.    - Congestive Heart Failure and edema noted, diuretics in use; weight fluctuations to be expected. Continue to monitor.

## 2025-06-18 NOTE — PROGRESS NOTE ADULT - PROBLEM SELECTOR PROBLEM 4
DVT of lower limb, acute
Pulmonary embolism
MATILDA (acute kidney injury)
Pulmonary embolism
Pulmonary embolism
DVT of lower limb, acute
Pulmonary embolism
MATILDA (acute kidney injury)
Pulmonary embolism
MATILDA (acute kidney injury)
Pulmonary embolism
Pulmonary embolism

## 2025-06-18 NOTE — PROGRESS NOTE ADULT - SUBJECTIVE AND OBJECTIVE BOX
Vascular Cardiology Progress Note     DIRECT PROVIDER NUMBER: [phone]  / Available on TEAMS    CC:  weakness and DVT    Interval Events:  Today is our patient's birthday.  He received warm congratulations from our service.   Breathing improved.  Reduced cough.   Remains on Lovenox 60 mg BID.   Doing well overall.       MEDICATIONS  (STANDING):  albuterol/ipratropium for Nebulization 3 milliLiter(s) Nebulizer every 6 hours  allopurinol 100 milliGRAM(s) Oral daily  enoxaparin Injectable 60 milliGRAM(s) SubCutaneous every 12 hours  escitalopram 10 milliGRAM(s) Oral daily  gabapentin 300 milliGRAM(s) Oral daily  guaiFENesin  milliGRAM(s) Oral every 12 hours  linaclotide 72 MICROGram(s) Oral before breakfast  metoprolol succinate ER 25 milliGRAM(s) Oral daily  mirabegron ER 25 milliGRAM(s) Oral daily  rosuvastatin 10 milliGRAM(s) Oral at bedtime  sodium chloride 3%  Inhalation 4 milliLiter(s) Inhalation every 6 hours  spironolactone 25 milliGRAM(s) Oral daily  torsemide 20 milliGRAM(s) Oral daily      PAST MEDICAL & SURGICAL HISTORY:  Chronic diastolic congestive heart failure  CAD (coronary artery disease)  No significant past surgical history    FAMILY HISTORY:  No pertinent family history in first degree relatives    SOCIAL HISTORY:  unchanged    REVIEW OF SYSTEMS:  CONSTITUTIONAL: No fever  EYES: No eye pain  ENT:  No throat pain  NECK: No pain   RESPIRATORY: SOB resolved   CARDIOVASCULAR:  No C/P  GASTROINTESTINAL: No abdominal pain  GENITOURINARY: No hematuria  SKIN: No LE wounds  LYMPH Nodes: No enlarged glands noted  ENDOCRINE: No heat or cold intolerance noted  MUSCULOSKELETAL:  Decreased LE edema  PSYCHIATRIC: No depression, anxiety  HEME/LYMPH: No bleeding gums  ALLERGY AND IMMUNOLOGIC: No hives    [ x] All others negative	    Vital Signs Last 24 Hrs  T(C): 36.8 (2025 12:00), Max: 36.8 (2025 23:58)  T(F): 98.2 (2025 12:00), Max: 98.2 (2025 23:58)  HR: 75 (2025 12:00) (70 - 83)  BP: 118/85 (2025 12:00) (114/80 - 120/78)  BP(mean): --  RR: 18 (2025 12:00) (18 - 19)  SpO2: 96% (2025 12:00) (94% - 96%)    Parameters below as of 2025 12:00  Patient On (Oxygen Delivery Method): nasal cannula  O2 Flow (L/min): 2    Appearance: NAD  Cardiovascular: RRR, S1 and S2  Respiratory: Decreased BS Bases  Gastrointestinal:  Soft, Non-tender, + BS	  Skin: No cyanosis	  Extremities:  LE edema, bilateral calves soft   Foot Exam: No tissue loss               Labs: see sunrise      Assessment:  1. Bilateral PE  Lobar and Segmental PE  2. R LE DVT  R popliteal DVT (resolved)  3. HFpEF  4. Cough  5. CAD s/p PCI  6. HTN  7. History of Falls  8. EF 46%  9. SDH  10. LV thrombus    Plan:  1. Transitioned to Lovenox 60 mg BID  (70% of therapeutic dose) on 6/10. Recommend to continue.   Dose modification recommended in setting of SDH. Paired with surveillance TTE and LE venous duplex.  2. Appreciate Neurosurgical evaluation.  3. Repeat CT head on 12 for surveillance of SDH, read as smaller and with no new hemorrhage.  4. Repeat LE venous duplex on 6/15 without DVT noted. Study improved.  5. Repeat TTE on  showed no obvious thrombus seen on images obtained.  6. Creatinine improving . Volume status stable. Continue Torsemide 20 mg daily.  7. Pneumatic compression to L lower extremity.  8. Patient is stable for transfer to [facility], from a Vascular Cardiology perspective.   9. Patient should also have outpatient Neurosurgical follow-up. Our office will arrange Vascular Cardiology follow-up.   10. Recommend repeat surveillance LE venous duplex in 1-2 weeks.  11. Recommend repeat TTE in 1 month for continued surveillance, given previously noted LV thrombus.   12. Patient congratulated on his Birthday today. Given warm regards and wishes by our service.   13. Appreciate excellent care by  Medicine Team.     Thank you  STEVEN Bryant, MS, [facility]  Vascular Cardiology Service    Please call with any questions:   DIRECT SERVICE NUMBER: [phone] / Available on TEAMS

## 2025-06-18 NOTE — PROGRESS NOTE ADULT - PROBLEM SELECTOR PROBLEM 2
Acute respiratory failure with hypoxia

## 2025-06-18 NOTE — DIETITIAN INITIAL EVALUATION ADULT - ORAL INTAKE PTA/DIET HISTORY
Patient visited. Patient appearing disoriented during visit, question hx accuracy. Per patient, denies adhering to a therapeutic diet and reports fair appetite prior to admission. NKFA or intolerances reported by the patient. Patient denies micronutrient supplementation prior to admission.

## 2025-06-18 NOTE — PROGRESS NOTE ADULT - PROBLEM SELECTOR PROBLEM 7
CAD (coronary artery disease)
Encounter for deep vein thrombosis (DVT) prophylaxis
CAD (coronary artery disease)
Encounter for deep vein thrombosis (DVT) prophylaxis
Encounter for deep vein thrombosis (DVT) prophylaxis
CAD (coronary artery disease)
CAD (coronary artery disease)
Encounter for deep vein thrombosis (DVT) prophylaxis
CAD (coronary artery disease)
Encounter for deep vein thrombosis (DVT) prophylaxis
CAD (coronary artery disease)

## 2025-06-18 NOTE — PROGRESS NOTE ADULT - SUBJECTIVE AND OBJECTIVE BOX
[facility] Division of Hospital Medicine  Bridger High MD  Available on Teams Tyesha    Patient is a 85y old  Male who presents with a chief complaint of weakness and DVT (18 Jun 2025 14:07)      SUBJECTIVE / OVERNIGHT EVENTS:  Pt seen and evaluated at bedside, with no acute overnight events. Awaiting d/c to rehab, notes overall improvement with cough. Denies any CP, abd pain. Last BM 6/17.     ADDITIONAL REVIEW OF SYSTEMS: negative    MEDICATIONS  (STANDING):  albuterol/ipratropium for Nebulization 3 milliLiter(s) Nebulizer every 6 hours  allopurinol 100 milliGRAM(s) Oral daily  enoxaparin Injectable 60 milliGRAM(s) SubCutaneous every 12 hours  escitalopram 10 milliGRAM(s) Oral daily  gabapentin 300 milliGRAM(s) Oral daily  guaiFENesin  milliGRAM(s) Oral every 12 hours  linaclotide 72 MICROGram(s) Oral before breakfast  metoprolol succinate ER 25 milliGRAM(s) Oral daily  mirabegron ER 25 milliGRAM(s) Oral daily  rosuvastatin 10 milliGRAM(s) Oral at bedtime  sodium chloride 3%  Inhalation 4 milliLiter(s) Inhalation every 6 hours  spironolactone 25 milliGRAM(s) Oral daily  torsemide 20 milliGRAM(s) Oral daily    MEDICATIONS  (PRN):  acetylcysteine 10%  Inhalation 4 milliLiter(s) Inhalation every 4 hours PRN increased secretions  guaiFENesin Oral Liquid (Sugar-Free) 100 milliGRAM(s) Oral every 6 hours PRN Cough  sodium chloride 0.65% Nasal 1 Spray(s) Both Nostrils three times a day PRN Nasal Congestion      CAPILLARY BLOOD GLUCOSE        I&O's Summary    17 Jun 2025 07:01  -  18 Jun 2025 07:00  --------------------------------------------------------  IN: 480 mL / OUT: 0 mL / NET: 480 mL        PHYSICAL EXAM:  Vital Signs Last 24 Hrs  T(C): 36.8 (18 Jun 2025 12:00), Max: 36.8 (17 Jun 2025 23:58)  T(F): 98.2 (18 Jun 2025 12:00), Max: 98.2 (17 Jun 2025 23:58)  HR: 75 (18 Jun 2025 12:00) (70 - 83)  BP: 118/85 (18 Jun 2025 12:00) (114/80 - 120/78)  BP(mean): --  RR: 18 (18 Jun 2025 12:00) (18 - 19)  SpO2: 96% (18 Jun 2025 12:00) (94% - 96%)    Parameters below as of 18 Jun 2025 12:00  Patient On (Oxygen Delivery Method): nasal cannula  O2 Flow (L/min): 2    CONSTITUTIONAL: Well-groomed, in no apparent distress  EYES: No conjunctival or scleral injection  ENMT: No external nasal lesions; oral mucosa with moist membranes  RESPIRATORY: Breathing comfortably; lungs with some expiratory wheezes   CARDIOVASCULAR: +S1S2, RRR, no M/G/R; pedal pulses full and symmetric, no edema  GASTROINTESTINAL: No palpable tenderness, +BS throughout, no rebound/guarding  MUSCULOSKELETAL: no digital clubbing or cyanosis  SKIN: No rashes or ulcers noted  NEUROLOGIC: mild R sided facial droop (baseline), no new FND  PSYCHIATRIC: A+O x 3    LABS:    RADIOLOGY & ADDITIONAL TESTS:  Results Reviewed:   Imaging Personally Reviewed:  Electrocardiogram Personally Reviewed:    COORDINATION OF CARE:  Care Discussed with Consultants/Other Providers [Y/N]:  Prior or Outpatient Records Reviewed [Y/N]:

## 2025-06-18 NOTE — DIETITIAN INITIAL EVALUATION ADULT - OTHER INFO
Pertinent History:   - PMH: CHF   - Per chart, MATILDA noted during admission    Pertinent Nutrition Related Labs:  Most recent available labs reviewed (6/16)  - BUN 27 (H), GFR 57 (L)  - Glucose 104 (H)  -  (H) 6/06    Pertinent Medications:  Diuretics in use

## 2025-06-18 NOTE — PROGRESS NOTE ADULT - PROBLEM SELECTOR PROBLEM 5
MATILDA (acute kidney injury)
Chronic diastolic congestive heart failure
Chronic diastolic congestive heart failure
MATILDA (acute kidney injury)
Chronic diastolic congestive heart failure
MATILDA (acute kidney injury)

## 2025-06-18 NOTE — PROGRESS NOTE ADULT - PROBLEM SELECTOR PROBLEM 6
Chronic diastolic congestive heart failure
CAD (coronary artery disease)
Chronic diastolic congestive heart failure
CAD (coronary artery disease)
Chronic diastolic congestive heart failure
Chronic diastolic congestive heart failure
CAD (coronary artery disease)
CAD (coronary artery disease)
Chronic diastolic congestive heart failure
CAD (coronary artery disease)
Chronic diastolic congestive heart failure

## 2025-06-18 NOTE — DIETITIAN INITIAL EVALUATION ADULT - ENERGY INTAKE
Per nursing flowsheets, fluctuating poor/fair p.o. intake noted during admission, 0-75%. Patient reports fair appetite. Offered smoothie of the day, and dietary preferences were reviewed. Food and nutrition services to be made aware./Poor (<50%)

## 2025-06-18 NOTE — PROGRESS NOTE ADULT - PROBLEM SELECTOR PLAN 2
2/2 bilateral PE. CTPE with atelectasis, no discrete PNA. Febrile 5/31, but likely 2/2 DVT/PE. BNP 2.9k with hx CHF however does not appear overloaded on exam  - on 6L NC, wean as tolerated  - monitor off abx  - airway clearance with chest vest, vibrapep, xopenex (became tachycardic with albuterol in ED), ipratroprium and hypersal  - Pulmonary consulted, f/u recs  - PE treatment as below
2/2 bilateral PE. CTPE with atelectasis, no discrete PNA. Febrile 5/31, but likely 2/2 DVT/PE. BNP 2.9k with hx CHF however does not appear overloaded on exam  - on 2L NC spo2 93% (down from 5), continue to wean as tolerated  - monitor off abx  - airway clearance with chest vest, IS, vibrapep, duonebs and hypersal  - Start mucinex PO BID and mucomyst  - Pulmonary folowing  - PE treatment as below
- febrile on 5/31, started on ceftriaxone/azithromycin for community acquired pneumonia causing acute hypoxic respiratory failure  - CXR clear though he's newly hypoxic, will get CT PE to evaluate for pneumonia and PE given recent DVT dx  - possible CHF exacerbation, BNP 2.9k, f/u CT chest, TTE. Diuretics as below.  - on 6L NC, wean as tolerated
2/2 bilateral PE. CTPE with atelectasis, no discrete PNA. Febrile 5/31, but likely 2/2 DVT/PE. BNP 2.9k with hx CHF however does not appear overloaded on exam  - on 2L NC spo2 93% (down from 5), continue to wean as tolerated  - monitor off abx  - airway clearance with chest vest, IS, vibrapep, duonebs and hypersal  - Continue mucinex PO BID and mucomyst  - Pulmonary recs appreciated  - PE treatment as below
2/2 bilateral PE. CTPE with atelectasis, no discrete PNA. Febrile 5/31, but likely 2/2 DVT/PE. BNP 2.9k with hx CHF however does not appear overloaded on exam  - on 5L NC, wean as tolerated  - monitor off abx  - airway clearance with chest vest, IS, vibrapep, duonebs and hypersal  - Pulmonary consulted, f/u recs  - PE treatment as below
2/2 bilateral PE. CTPE with atelectasis, no discrete PNA. Febrile 5/31, but likely 2/2 DVT/PE. BNP 2.9k with hx CHF however does not appear overloaded on exam  - on 4L NC, wean as tolerated  - monitor off abx  - airway clearance with chest vest, IS, vibrapep, duonebs and hypersal  - Pulmonary consulted, f/u recs  - PE treatment as below
2/2 bilateral PE. CTPE with atelectasis, no discrete PNA. Febrile 5/31, but likely 2/2 DVT/PE. BNP 2.9k with hx CHF however does not appear overloaded on exam  - on 6L NC, wean as tolerated  - monitor off abx  - airway clearance with chest vest, IS, vibrapep, will trial duonebs again (was tachycardic in ED post duoneb and was placed on xopenex) and hypersal  - Pulmonary consulted, f/u recs  - PE treatment as below
2/2 bilateral PE. CTPE with atelectasis, no discrete PNA. Febrile 5/31, but likely 2/2 DVT/PE. BNP 2.9k with hx CHF however does not appear overloaded on exam  - on 2L NC (down from 5), continue to wean as tolerated  - monitor off abx  - airway clearance with chest vest, IS, vibrapep, duonebs and hypersal  - Pulmonary consulted, f/u recs  - PE treatment as below
2/2 bilateral PE. CTPE with atelectasis, no discrete PNA. Febrile 5/31, but likely 2/2 DVT/PE. BNP 2.9k with hx CHF however does not appear overloaded on exam  - on 2L NC spo2 93% (down from 5), continue to wean as tolerated  - monitor off abx  - airway clearance with chest vest, IS, vibrapep, duonebs and hypersal  - Start mucinex PO BID and mucomyst  - Pulmonary following  - PE treatment as below
2/2 bilateral PE. CTPE with atelectasis, no discrete PNA. Febrile 5/31, but likely 2/2 DVT/PE. BNP 2.9k with hx CHF however does not appear overloaded on exam  - on 2L NC spo2 93% (down from 5), continue to wean as tolerated  - monitor off abx  - airway clearance with chest vest, IS, vibrapep, duonebs and hypersal  - Continue mucinex PO BID and mucomyst  - Pulmonary following  - PE treatment as below
2/2 bilateral PE. CTPE with atelectasis, no discrete PNA. Febrile 5/31, but likely 2/2 DVT/PE. BNP 2.9k with hx CHF however does not appear overloaded on exam  - on 2L NC spo2 93% (down from 5), continue to wean as tolerated  - monitor off abx  - airway clearance with chest vest, IS, vibrapep, duonebs and hypersal  - Start mucinex PO BID and mucomyst  - Pulmonary folowing  - PE treatment as below
2/2 bilateral PE. CTPE with atelectasis, no discrete PNA. Febrile 5/31, but likely 2/2 DVT/PE. BNP 2.9k with hx CHF however does not appear overloaded on exam  - on 4L NC, wean as tolerated  - monitor off abx  - airway clearance with chest vest, IS, vibrapep, duonebs and hypersal  - Pulmonary consulted, f/u recs  - PE treatment as below
- febrile on 5/31, started on ceftriaxone/azithromycin for community acquired pneumonia causing acute hypoxic respiratory failure  - f/u legionella ag  - CXR clear though he's newly hypoxic, will get CT PE to evaluate for pneumonia and PE given recent DVT dx
2/2 bilateral PE. CTPE with atelectasis, no discrete PNA. Febrile 5/31, but likely 2/2 DVT/PE. BNP 2.9k with hx CHF however does not appear overloaded on exam  - on 2L NC (down from 5), continue to wean as tolerated  - monitor off abx  - airway clearance with chest vest, IS, vibrapep, duonebs and hypersal  - Pulmonary consulted, f/u recs  - PE treatment as below
2/2 bilateral PE. CTPE with atelectasis, no discrete PNA. Febrile 5/31, but likely 2/2 DVT/PE. BNP 2.9k with hx CHF however does not appear overloaded on exam  - on 2L NC spo2 93% (down from 5), continue to wean as tolerated  - monitor off abx  - airway clearance with chest vest, IS, vibrapep, duonebs and hypersal  - Continue mucinex PO BID and mucomyst  - Pulmonary recs appreciated  - PE treatment as below
2/2 bilateral PE. CTPE with atelectasis, no discrete PNA. Febrile 5/31, but likely 2/2 DVT/PE. BNP 2.9k with hx CHF however does not appear overloaded on exam  - on 2L NC spo2 93% (down from 5), continue to wean as tolerated  - monitor off abx  - airway clearance with chest vest, IS, vibrapep, duonebs and hypersal  - Start mucinex PO BID  - Pulmonary consulted, f/u recs  - PE treatment as below
2/2 bilateral PE. CTPE with atelectasis, no discrete PNA. Febrile 5/31, but likely 2/2 DVT/PE. BNP 2.9k with hx CHF however does not appear overloaded on exam  - on 2L NC spo2 93% (down from 5), continue to wean as tolerated  - monitor off abx  - airway clearance with chest vest, IS, vibrapep, duonebs and hypersal  - Continue mucinex PO BID and mucomyst  - Pulmonary recs appreciated  - PE treatment as below
2/2 bilateral PE. CTPE with atelectasis, no discrete PNA. Febrile 5/31, but likely 2/2 DVT/PE. BNP 2.9k with hx CHF however does not appear overloaded on exam  - on 6L NC, wean as tolerated  - monitor off abx  - airway clearance with chest vest, IS, vibrapep, duonebs and hypersal  - Pulmonary consulted, f/u recs  - PE treatment as below

## 2025-06-18 NOTE — PROGRESS NOTE ADULT - PROBLEM SELECTOR PLAN 8
DVT PPX: therapeutic lovenox  Ex-wife Alyssa ([phone])  is the designated primary contact  Dispo: DC to [facility], pending auth
DVT PPX: therpautic lovenox    Ex-wife Alyssa ([phone])  is the designated primary contact.    Dispo: DC to [facility], pending repeat TTE and LE 
DVT PPX: therapeutic lovenox  Ex-wife Alyssa ([phone])  is the designated primary contact; updated 6/16.  Dispo: DC to [facility], pending auth
DVT PPX: transition from heparin gtt to lovenox    Ex-wife Alyssa ([phone])  is the designated primary contact.    Dispo: DC to SU once cleared by cardiology and NSGY
DVT PPX: transition from heparin gtt to lovenox    Ex-wife Alyssa ([phone])  is the designated primary contact.    Dispo: DC to [facility], pending auth and repeat CT head
DVT PPX: hep gtt    Ex-wife Alyssa ([phone]) updated 6/8. She is the designated primary contact.    Dispo: DC to SU once cleared by cardiology and NSGY
DVT PPX: therapeutic lovenox    Ex-wife Alyssa ([phone])  is the designated primary contact.    Dispo: DC to SU, pending repeat TTE and KELSIE HERMAN
DVT PPX: transition from heparin gtt to lovenox    Ex-wife Alyssa ([phone]) updated 6/10. She is the designated primary contact.    Dispo: DC to SU once cleared by cardiology and NSGY
DVT PPX: therapeutic lovenox  Ex-wife Alyssa ([phone])  is the designated primary contact; updated 6/16.  Dispo: DC to SU, pending repeat TTE
-hep gtt    Ex-wife Alyssa ([phone]) updated 6/8. She is the designated primary contact.
-hep gtt on hold    Ex wife Alyssa ( [phone] ) and daughter updated at bedside 6/6
-hep gtt    Ex wife Alyssa ( [phone] ) updated 6/7.
DVT PPX: transition from heparin gtt to lovenox    Ex-wife Alyssa ([phone])  is the designated primary contact.    Dispo: DC to [facility], pending auth and repeat CT head

## 2025-06-18 NOTE — DIETITIAN INITIAL EVALUATION ADULT - REASON
Deferred. Patient not feeling well during RD visit. RD to perform physical exam at follow up or as able.

## 2025-06-18 NOTE — PROGRESS NOTE ADULT - PROBLEM SELECTOR PLAN 4
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 Acute R popliteal DVT. LLE duplex neg.  - TTE obtained, LV thrombus noted. Repeat TTE per cardiology recs.  - Discussed with vascular cardiology: transitioned to lovenox 60 mg BID reduced dose in the setting of SDH  - Repeat LE doppler 6/11 per vascular cards recs  - APLS labs neg, outpatient hypercoag eval  - outpatient malignancy eval:  has 1.7 cm JS nodule, needs repeat CT chest in 8 weeks; cancer care connect referral
[Good] : ~his/her~  mood as  good
[] : No
[Yes] : Yes
[Monthly or less (1 pt)] : Monthly or less (1 point)
[No] : In the past 12 months have you used drugs other than those required for medical reasons? No
[No falls in past year] : Patient reported no falls in the past year
[de-identified] : active
[de-identified] : regular
#LV thrombus  #DVT  bilateral PE, most proximal is lobar. Acute R popliteal DVT. LLE duplex neg.  - TTE obtained, LV thrombus noted  - Continue lovenox 60 mg BID reduced dose in the setting of SDH  - Discussed with vascular cardiology: repeat third LE duplex 6/15 without DVT, TTE 6/16 with preserved EF  - outpatient malignancy eval:  has 1.7 cm JS nodule, needs repeat CT chest in 8 weeks; cancer care [facility] referral
[None] : None
[With Family] : lives with family
[Employed] : employed
[Feels Safe at Home] : Feels safe at home
#LV thrombus  #DVT  bilateral PE, most proximal is lobar. Acute R popliteal DVT. LLE duplex neg.  - TTE obtained, LV thrombus noted  - Continue lovenox 60 mg BID reduced dose in the setting of SDH  - Discussed with vascular cardiology: repeat third LE duplex 6/15 without DVT, pending TTE 6/16 - if stable can be dc from vascular cards perspective  - outpatient malignancy eval:  has 1.7 cm JS nodule, needs repeat CT chest in 8 weeks; cancer care [facility] referral
[Fully functional (bathing, dressing, toileting, transferring, walking, feeding)] : Fully functional (bathing, dressing, toileting, transferring, walking, feeding)
[Fully functional (using the telephone, shopping, preparing meals, housekeeping, doing laundry, using] : Fully functional and needs no help or supervision to perform IADLs (using the telephone, shopping, preparing meals, housekeeping, doing laundry, using transportation, managing medications and managing finances)
[Reports changes in hearing] : Reports no changes in hearing
acute popliteal, started on hep gtt  -continue hep for now, transition to DOAC after CT PE, previous attending discussed risk/benefits with family  -aspirin d/gloria
[Reports changes in vision] : Reports no changes in vision
#LV thrombus  #DVT  bilateral PE, most proximal is lobar. Acute R popliteal DVT. LLE duplex neg.  - TTE obtained, LV thrombus noted  - Continue lovenox 60 mg BID reduced dose in the setting of SDH  - Discussed with vascular cardiology: repeat third TTE and LE duplex 6/16 on Lovenox  - outpatient malignancy eval:  has 1.7 cm JS nodule, needs repeat CT chest in 8 weeks; cancer care [facility] referral
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 Acute R popliteal DVT. LLE duplex neg.  - TTE obtained, LV thrombus noted  - AC with heparin gtt   - vascular cards consulted, appreciate recs  - APLS labs neg, outpatient hypercoag eval  - outpatient malignancy eval:  has 1.7 cm JS nodule, needs repeat CT chest in 8 weeks; cancer care [facility] referral
[Reports changes in dental health] : Reports no changes in dental health
[Smoke Detector] : smoke detector
acute popliteal, started on hep gtt  -continue hep for now, transition to DOAC after CT PE, previous attending discussed risk/benefits with family  -aspirin d/gloria
[Carbon Monoxide Detector] : carbon monoxide detector
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 Acute R popliteal DVT. LLE duplex neg.  - TTE obtained, LV thrombus noted. Repeat TTE per cardiology recs.  - Discussed with vascular cardiology: recommend transition from heparin gtt to lovenox dose 0.7 mg /kg reduced dose in the setting of SDH  - Repeat LE doppler 6/11 per vascular cards recs  - APLS labs neg, outpatient hypercoag eval  - outpatient malignancy eval:  has 1.7 cm JS nodule, needs repeat CT chest in 8 weeks; cancer care [facility] referral
[Seat Belt] :  uses seat belt
bilateral PE, most proximal is lobar. Acute R popliteal DVT. LLE duplex neg.  - heparin gtt on hold as above  - vascular cards consulted, appreciate recs  - TTE obtained, LV thrombus noted  - outpatient malignancy eval  - has 1.7 cm JS nodule, needs repeat CT chest in 8 weeks; cancer care [facility] referral  - APLS labs neg, outpatient hypercoag eval  - will repeat LE duplex 6/7 since off AC
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 Acute R popliteal DVT. LLE duplex neg.  - TTE obtained, LV thrombus noted. Repeat TTE per cardiology recs. Repeat TTE 6/11 stable.   - Discussed with vascular cardiology: transitioned to lovenox 60 mg BID reduced dose in the setting of SDH  - Repeat LE doppler 6/11 stable  - APLS labs neg, outpatient hypercoag eval  - outpatient malignancy eval:  has 1.7 cm JS nodule, needs repeat CT chest in 8 weeks; cancer care [facility] referral
-improving now with diuresis, due to cardiorenal syndrome? F/u TTE  -s/p 1 dose lasix 40mg IV, resume torsemide 40mg PO now  -bladder scan only 100ml  -trend Cr
-improving now with diuresis, due to cardiorenal syndrome? F/u TTE  -s/p 1 dose lasix 40mg IV, resume torsemide 40mg PO now  -bladder scan only 100ml  -trend Cr
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 Acute R popliteal DVT. LLE duplex neg.  - TTE obtained, LV thrombus noted. Repeat TTE per cardiology recs. Repeat TTE 6/11 stable.   - Discussed with vascular cardiology: transitioned to lovenox 60 mg BID reduced dose in the setting of SDH  - Repeat LE doppler 6/11 stable  - APLS labs neg, outpatient hypercoag eval  - outpatient malignancy eval:  has 1.7 cm JS nodule, needs repeat CT chest in 8 weeks; cancer care connect referral
#LV thrombus  #DVT  bilateral PE, most proximal is lobar. Acute R popliteal DVT. LLE duplex neg.  - TTE obtained, LV thrombus noted  - Continue lovenox 60 mg BID reduced dose in the setting of SDH  - Discussed with vascular cardiology: repeat third TTE and LE duplex 6/16 on Lovenox  - outpatient malignancy eval:  has 1.7 cm JS nodule, needs repeat CT chest in 8 weeks; cancer care [facility] referral
-improving now with diuresis, due to cardiorenal syndrome? F/u TTE  -s/p 1 dose lasix 40mg IV, resume torsemide 40mg PO now  -bladder scan only 100ml  -trend Cr
bilateral PE, most proximal is lobar. Acute R popliteal DVT. LLE duplex neg.  - heparin gtt on hold as above  - vascular cards consulted, appreciate recs  - TTE obtained, LV thrombus noted  - outpatient malignancy eval  - has 1.7 cm JS nodule, needs repeat CT chest in 8 weeks; cancer care [facility] referral  - APLS labs neg, outpatient hypercoag eval
#LV thrombus  #DVT  bilateral PE, most proximal is lobar. Acute R popliteal DVT. LLE duplex neg.  - TTE obtained, LV thrombus noted  - Continue lovenox 60 mg BID reduced dose in the setting of SDH  - Discussed with vascular cardiology: repeat third LE duplex 6/15 without DVT, TTE 6/16 with preserved EF  - outpatient malignancy eval:  has 1.7 cm JS nodule, needs repeat CT chest in 8 weeks; cancer care connect referral
bilateral PE, most proximal is lobar. Acute R popliteal DVT. LLE duplex neg.  - heparin gtt on hold as above  - vascular cards consulted, appreciate recs  - TTE obtained, LV thrombus noted  - outpatient malignancy eval  - has 1.7 cm JS nodule, needs repeat CT chest in 8 weeks; cancer care [facility] referral  - APLS labs neg, outpatient hypercoag eval

## 2025-06-18 NOTE — DIETITIAN INITIAL EVALUATION ADULT - PROBLEM SELECTOR PLAN 1
Recurrent over the past few months, although appears to have worsened this week. c/f infectious process. UA clean. Has had ongoing cough c/f viral illness  -f/u RVP  -PT  -holding home gabapentin (600mg TID), pending mental status tomorrow consider restarting at lower dose

## 2025-06-18 NOTE — PROGRESS NOTE ADULT - PROBLEM SELECTOR PLAN 3
CTH 6/6: left frontal parafalcine subdural hemorrhage new since 5/31/2025, stable on short interval repeat  - repeat CTH stable  - heparin gtt restarted with low ptt goal of ~60-65, no bolus. CTH after therapeutic was stable. Will need to f/u NSGY when appropriate to switch to DOAC.
bilateral PE, most proximal is lobar. Acute R popliteal DVT. LLE duplex neg.  - c/w heparin gtt  - vascular cards consulted, appreciate recs  - TTE obtained, read pending  - outpatient malignancy eval  - has 1.7 cm JS nodule, needs repeat CT chest in 8 weeks; cancer care [facility] referral  - APLS labs neg, outpatient hypercoag eval
CTH 6/6: left frontal parafalcine subdural hemorrhage new since 5/31/2025, stable on short interval repeat  - CTH 6/12 on lovenox with improvement  - Neurosurgery following
bilateral PE, most proximal is lobar. Acute R popliteal DVT  - c/w heparin gtt  - vascular cards consulted, appreciate recs  - TTE, LLE duplex pending  - outpatient malignancy eval  - has 1.7 cm JS nodule, needs repeat CT chest in 8 weeks; cancer [facility] referral  - APLS labs, outpatient hypercoag eval
bilateral PE, most proximal is lobar. Acute R popliteal DVT. LLE duplex neg.  - c/w heparin gtt  - vascular cards consulted, appreciate recs  - TTE  - outpatient malignancy eval  - has 1.7 cm JS nodule, needs repeat CT chest in 8 weeks; cancer [facility] referral  - APLS labs neg, outpatient hypercoag eval
CTH 6/6: left frontal parafalcine subdural hemorrhage new since 5/31/2025, stable on short interval repeat  - repeat CTH 6/9 stable  - Cleared by NSGY to start lovenox and recommend repeat CHT after 48h.
CTH 6/6: left frontal parafalcine subdural hemorrhage new since 5/31/2025, stable on short interval repeat  - repeat CTH 6/9 stable  - Cleared by NSGY to start lovenox and recommend repeat CHT after 48h (6/12)
CTH 6/6: left frontal parafalcine subdural hemorrhage new since 5/31/2025, stable on short interval repeat  - repeat CTH stable  - heparin gtt restarted with low ptt goal of ~60-65, no bolus. CTH after therapeutic was stable.  - Repeat CTH pending  - Will need to f/u NSGY ans vascular cardiology when appropriate to switch to DOAC and discussed benefit of IVC filter, pending repeat CTH.
-improving now with diuresis, due to cardiorenal syndrome? F/u TTE  -s/p 1 dose lasix 40mg IV, resume torsemide 40mg PO now  -bladder scan only 100ml  -trend Cr
CTH 6/6: left frontal parafalcine subdural hemorrhage new since 5/31/2025, stable on short interval repeat  - CTH 6/12 on lovenox with improvement  - Home ASA, plavix held   - Neurosurgery following
CTH 6/6: left frontal parafalcine subdural hemorrhage new since 5/31/2025, stable on short interval repeat  - CTH 6/12 on lovenox with improvement  - Neurosurgery following
CTH 6/6: left frontal parafalcine subdural hemorrhage new since 5/31/2025, stable on short interval repeat  - repeat CTH 6/9 stable  - Cleared by NSGY to start lovenox and recommend repeat CHT after 48h (6/12)
CTH 6/6: left frontal parafalcine subdural hemorrhage new since 5/31/2025, stable on short interval repeat  - CTH 6/12 on lovenox with improvement  - Neurosurgery consulted, recs appreciated
CTH 6/6: left frontal parafalcine subdural hemorrhage new since 5/31/2025, stable on short interval repeat  - repeat CTH stable  - heparin gtt restarted with low ptt goal of ~60-65, no bolus. Repeat CTH when therapeutic. F/u NSGY recs
-improving now with diuresis, due to cardiorenal syndrome? F/u TTE  -s/p 1 dose lasix 40mg IV, resume torsemide 40mg PO now  -bladder scan only 100ml  -trend Cr
CTH 6/6: left frontal parafalcine subdural hemorrhage new since 5/31/2025, stable on short interval repeat  - obtain 24h repeat CTH  - AC/AP on hold pending NSGY recs
CTH 6/6: left frontal parafalcine subdural hemorrhage new since 5/31/2025, stable on short interval repeat  - CTH 6/12 on lovenox with improvement  - Home ASA, plavix held   - Neurosurgery following
CTH 6/6: left frontal parafalcine subdural hemorrhage new since 5/31/2025, stable on short interval repeat  - CTH 6/12 on lovenox with improvement  - Neurosurgery consulted, recs appreciated

## 2025-06-18 NOTE — DIETITIAN INITIAL EVALUATION ADULT - ADD RECOMMEND
1. Recommend liberalize to low sodium diet for p.o. optimization  - RD to provide smoothie of the day (~328kcals, ~26g protein) for p.o. optimization   2. Recommend Multivitamin to promote pressure injury healing   3. CHF noted, recommend daily weights   4. Monitor routine weights, nutrition related labs, diet advancements, p.o. intake and tolerance, and skin integrity

## 2025-06-18 NOTE — PROGRESS NOTE ADULT - PROBLEM SELECTOR PLAN 1
Recurrent over the past few months, although appears to have worsened this week. May be 2/2 PE. Infectious work up ngtd  -covid/flu/rsv negative  -PT rec SU  -holding home gabapentin (600mg TID), will consider restarting at lower dose
Recurrent over the past few months, although appears to have worsened this week. May be 2/2 PE. Infectious work up ngtd  -covid/flu/rsv negative  -holding home gabapentin (600mg TID), restarted at low dose 300 mg daily   -PT rec SU
Recurrent over the past few months, although appears to have worsened this week. May be 2/2 PE. Infectious work up ngtd  -covid/flu/rsv negative  -holding home gabapentin (600mg TID), will consider restarting at lower dose  -PT rec SU
Recurrent over the past few months, although appears to have worsened this week. May be 2/2 PE. Infectious work up ngtd  -covid/flu/rsv negative  -PT rec SU  -holding home gabapentin (600mg TID), will consider restarting at lower dose
Recurrent over the past few months, although appears to have worsened this week. May be 2/2 PE. Infectious work up ngtd  -covid/flu/rsv negative  -holding home gabapentin (600mg TID), consider restarting at lower dose  -PT rec SU
Recurrent over the past few months, although appears to have worsened this week. May be 2/2 PE. Infectious work up ngtd  -covid/flu/rsv negative  -holding home gabapentin (600mg TID), restarted at low dose 300 mg daily   -PT rec SU
Recurrent over the past few months, although appears to have worsened this week. May be 2/2 PE. Infectious work up ngtd  -covid/flu/rsv negative  -holding home gabapentin (600mg TID), will consider restarting at lower dose  -PT rec SU
Recurrent over the past few months, although appears to have worsened this week. May be 2/2 PE. Infectious work up ngtd  -covid/flu/rsv negative  -PT rec SU  -holding home gabapentin (600mg TID), will consider restarting at lower dose
Recurrent over the past few months, although appears to have worsened this week. c/f infectious process. UA clean. Has had ongoing cough c/f viral illness  -covid/flu/rsv negative  -PT rec SU  -holding home gabapentin (600mg TID), will consider restarting at lower dose
Recurrent over the past few months, although appears to have worsened this week. May be 2/2 PE. Infectious work up ngtd  -covid/flu/rsv negative  -PT rec SU  -holding home gabapentin (600mg TID), will consider restarting at lower dose
Recurrent over the past few months, although appears to have worsened this week. c/f infectious process. UA clean. Has had ongoing cough c/f viral illness  -covid/flu/rsv negative  -PT  -holding home gabapentin (600mg TID), pending mental status tomorrow consider restarting at lower dose
Recurrent over the past few months, although appears to have worsened this week. May be 2/2 PE. Infectious work up ngtd  -covid/flu/rsv negative  -holding home gabapentin (600mg TID), will consider restarting at lower dose  -PT catherine BLUE CM working on placement
Recurrent over the past few months, although appears to have worsened this week. May be 2/2 PE. Infectious work up ngtd  -covid/flu/rsv negative  -PT rec SU  -holding home gabapentin (600mg TID), will consider restarting at lower dose
Recurrent over the past few months, although appears to have worsened this week. May be 2/2 PE. Infectious work up ngtd  -covid/flu/rsv negative  -PT rec SU  -holding home gabapentin (600mg TID), will consider restarting at lower dose

## 2025-06-18 NOTE — PROGRESS NOTE ADULT - REASON FOR ADMISSION
weakness and DVT

## 2025-06-18 NOTE — PROGRESS NOTE ADULT - PROBLEM SELECTOR PLAN 5
Improved with diuretics earlier this admission.  - Cr stable
Improved with diuretics earlier this admission.  - Cr stable
Improved with diuretics earlier this admission.  -resumed torsemide 40mg PO now  -bladder scan only 100ml  -trend Cr
Improved with diuretics earlier this admission.  - Cr stable
f/u with [facility] outpatient. Unclear if reduced or preserved EF. Has struggled with chronic LE edema, unclear how much is 2/2 to HF vs Chronic venous insufficieny (per outpatient notes appears more likely the later)   -on torsemide 40mg outpatient, resumed  -continue spironolactone 25mg daily  -continue metop 25mg daily  -previously on ace, off currently  -TTE
Improved with diuretics earlier this admission.  - Cr stable
-improving now with diuresis, due to cardiorenal syndrome? F/u TTE  -s/p 1 dose lasix 40mg IV, resume torsemide 40mg PO now  -bladder scan only 100ml  -trend Cr
Improved with diuretics earlier this admission.  -resumed torsemide 40mg PO now  -bladder scan only 100ml  -trend Cr
f/u with [facility] outpatient. Unclear if reduced or preserved EF. Has struggled with chronic LE edema, unclear how much is 2/2 to HF vs Chronic venous insufficieny (per outpatient notes appears more likely the later)   -on torsemide 40mg outpatient, resumed  -continue spironolactone 25mg daily  -continue metop 25mg daily  -previously on ace, off currently  -TTE
Improved with diuretics earlier this admission.  -resumed torsemide 40mg PO now  - Cr stable
Improved with diuretics earlier this admission.  - Cr stable
Improved with diuretics earlier this admission.  - Cr stable
f/u with [facility] outpatient. Unclear if reduced or preserved EF. Has struggled with chronic LE edema, unclear how much is 2/2 to HF vs Chronic venous insufficieny (per outpatient notes appears more likely the later)   -acute on chronic diastolic heart failure exacerbation  -s/p 1 dose Lasix 40 IV  -on torsemide 40mg outpatient, resume now  -continue spironolactone 25mg daily  -continue metop 25mg daily  -previously on ace, off currently  -TTE
Improved with diuretics earlier this admission.  - Cr stable
Improved with diuretics earlier this admission.  - Cr stable
f/u with [facility] outpatient. Unclear if reduced or preserved EF. Has struggled with chronic LE edema, unclear how much is 2/2 to HF vs Chronic venous insufficieny (per outpatient notes appears more likely the later)   -on torsemide 40mg outpatient, resumed  -continue spironolactone 25mg daily  -continue metop 25mg daily  -previously on ace, off currently  -TTE
Improved with diuretics earlier this admission.  - Cr stable
f/u with [facility] outpatient. Unclear if reduced or preserved EF. Has struggled with chronic LE edema, unclear how much is 2/2 to HF vs Chronic venous insufficieny (per outpatient notes appears more likely the later)   -acute on chronic diastolic heart failure exacerbation  -s/p 1 dose Lasix 40 IV  -on torsemide 40mg outpatient, resume now  -continue spironolactone 25mg daily  -continue metop 25mg daily  -previously on ace, off currently  -TTE

## 2025-06-18 NOTE — PROGRESS NOTE ADULT - PROBLEM SELECTOR PLAN 7
-hep gtt    Ex wife Alyssa updated at [phone] 6/3
-hep gtt    Ex wife Alyssa updated at [phone] 6/3
last stent > 10 years prior  -continue statin  -plavix on hold as above
last stent > 10 years prior  -continue statin  -plavix on hold as above, ASA on hold; on AC
-hep gtt    Wife Alyssa updated at [phone] 6/2
-continue statin  -plavix on hold as above  last stent > 10 years prior
last stent > 10 years prior  -continue statin  -plavix on hold as above
last stent > 10 years prior  -continue statin  -plavix on hold as above
-hep gtt    Ex wife Alyssa updated at [phone] 6/5
last stent > 10 years prior  -continue statin  -plavix on hold as above
last stent > 10 years prior  -continue statin  -plavix on hold as above
-hep gtt    New thrombocytopenia  - hgb stable  - unclear etiology, cont to monitor, plt still >100 so continue heparin  - doubt HIT, has only been here <5 days, don't think he's been hospitalized recently, [facility]
last stent > 10 years prior  -continue statin  -plavix on hold as above
-continue statin  -plavix on hold as above  last stent > 10 years prior
-continue statin  -plavix on hold as above  last stent > 10 years prior
last stent > 10 years prior  -continue statin  -plavix on hold as above
last stent > 10 years prior  -continue statin  -plavix on hold as above
last stent > 10 years prior  -continue statin  -plavix on hold as above, ASA on hold; on AC

## 2025-06-18 NOTE — DIETITIAN INITIAL EVALUATION ADULT - REASON INDICATOR FOR ASSESSMENT
Dietitian visit for: Length of stay  Chart reviewed, events noted  Information obtained from: electronic medical record, patient

## 2025-06-18 NOTE — PROGRESS NOTE ADULT - PROVIDER SPECIALTY LIST ADULT
Neurosurgery
Pulmonology
Pulmonology
Vascular Cardiology
Hospitalist
Internal Medicine
Hospitalist

## 2025-06-18 NOTE — DIETITIAN INITIAL EVALUATION ADULT - [id number]_GEN_A_CORE
Per nursing flowsheets,  Per nursing flowsheets, rey heel deep tissue injury and stage one sacral pressure injury noted.

## 2025-06-18 NOTE — PROGRESS NOTE ADULT - PROBLEM SELECTOR PROBLEM 3
Non-traumatic subdural hematoma (SDH)
Pulmonary embolism
Non-traumatic subdural hematoma (SDH)
MATILDA (acute kidney injury)
Non-traumatic subdural hematoma (SDH)
Non-traumatic subdural hematoma (SDH)
Pulmonary embolism
Non-traumatic subdural hematoma (SDH)
MATILDA (acute kidney injury)
Non-traumatic subdural hematoma (SDH)
Pulmonary embolism
Non-traumatic subdural hematoma (SDH)

## 2025-06-18 NOTE — PROGRESS NOTE ADULT - TIME BILLING
- Reviewing, and interpreting labs and testing.  - Independently obtaining a review of systems and performing a physical exam  - Reviewing consultant documentation/recommendations in addition to discussing plan of care with consultants.  - Counselling and educating patient regarding interpretation of aforementioned items and plan of care.  - This excludes time spent teaching residents/medical students
Time spent on review of vitals, physical exam, documentation, and discussion of plan of care with patient, patient family, consultants and multidisciplinary team.
- Reviewing, and interpreting labs and testing.  - Independently obtaining a review of systems and performing a physical exam  - Reviewing consultant documentation/recommendations in addition to discussing plan of care with consultants.  - Counselling and educating patient regarding interpretation of aforementioned items and plan of care.  - This excludes time spent teaching residents/medical students
Time spent on review of vitals, physical exam, documentation, and discussion of plan of care with patient, patient family, and multidisciplinary team.
- Reviewing, and interpreting labs and testing.  - Independently obtaining a review of systems and performing a physical exam  - Reviewing consultant documentation/recommendations in addition to discussing plan of care with consultants.  - Counselling and educating patient regarding interpretation of aforementioned items and plan of care.  - This excludes time spent teaching residents/medical students
Time spent on review of vitals, physical exam, documentation, and discussion of plan of care with patient, patient family, consultants and multidisciplinary team.
- Reviewing, and interpreting labs and testing.  - Independently obtaining a review of systems and performing a physical exam  - Reviewing consultant documentation/recommendations in addition to discussing plan of care with consultants.  - Counselling and educating patient regarding interpretation of aforementioned items and plan of care.  - This excludes time spent teaching residents/medical students
- Reviewing, and interpreting labs and testing.  - Independently obtaining a review of systems and performing a physical exam  - Reviewing consultant documentation/recommendations in addition to discussing plan of care with consultants.  - Counselling and educating patient regarding interpretation of aforementioned items and plan of care.  - This excludes time spent teaching residents/medical students
Medical management as above, reviewing chart and coordinating care with primary team/staff, as well as reviewing vitals, radiology, medication list, recent labs, and prior records.    Does not include teaching time.
Preparation to see the patient including review of labs and prior notes, performing a physical exam, counseling and educating the patient, ordering medications and tests, documenting the note, and coordinating care.
- Reviewing, and interpreting labs and testing.  - Independently obtaining a review of systems and performing a physical exam  - Reviewing consultant documentation/recommendations in addition to discussing plan of care with consultants.  - Counselling and educating patient regarding interpretation of aforementioned items and plan of care.  - This excludes time spent teaching residents/medical students
Time spent on review of vitals, physical exam, documentation, and discussion of plan of care with patient, patient family, consultants and multidisciplinary team.
- Reviewing, and interpreting labs and testing.  - Independently obtaining a review of systems and performing a physical exam  - Reviewing consultant documentation/recommendations in addition to discussing plan of care with consultants.  - Counselling and educating patient regarding interpretation of aforementioned items and plan of care.  - This excludes time spent teaching residents/medical students
- Reviewing, and interpreting labs and testing.  - Independently obtaining a review of systems and performing a physical exam  - Reviewing consultant documentation/recommendations in addition to discussing plan of care with consultants.  - Counselling and educating patient regarding interpretation of aforementioned items and plan of care.  - This excludes time spent teaching residents/medical students
Time spent on review of vitals, physical exam, documentation, and discussion of plan of care with patient, patient family, consultants and multidisciplinary team.
- Reviewing, and interpreting labs and testing.  - Independently obtaining a review of systems and performing a physical exam  - Reviewing consultant documentation/recommendations in addition to discussing plan of care with consultants.  - Counselling and educating patient regarding interpretation of aforementioned items and plan of care.  - This excludes time spent teaching residents/medical students
- Reviewing, and interpreting labs and testing.  - Independently obtaining a review of systems and performing a physical exam  - Reviewing consultant documentation/recommendations in addition to discussing plan of care with consultants.  - Counselling and educating patient regarding interpretation of aforementioned items and plan of care.  - This excludes time spent teaching residents/medical students
Time spent on review of vitals, physical exam, documentation, and discussion of plan of care with patient, patient family, consultants and multidisciplinary team.
Time spent on review of vitals, physical exam, documentation, and discussion of plan of care with patient, patient family and multidisciplinary team.

## 2025-06-18 NOTE — DIETITIAN INITIAL EVALUATION ADULT - NSFNSNUTRCHEWSWALLOWFT_GEN_A_CORE
- Per patient, denies chewing or swallowing difficulty at this time.   - Per chart, swallow assessment performed 6/16, SLP recommending, "regular solids with thin liquids via single sips"

## 2025-06-19 ENCOUNTER — TRANSCRIPTION ENCOUNTER (OUTPATIENT)
Age: 85
End: 2025-06-19

## 2025-06-19 VITALS
HEART RATE: 78 BPM | TEMPERATURE: 98 F | SYSTOLIC BLOOD PRESSURE: 118 MMHG | DIASTOLIC BLOOD PRESSURE: 78 MMHG | OXYGEN SATURATION: 95 % | RESPIRATION RATE: 18 BRPM

## 2025-06-19 LAB
ANION GAP SERPL CALC-SCNC: 17 MMOL/L — SIGNIFICANT CHANGE UP (ref 5–17)
BUN SERPL-MCNC: 29 MG/DL — HIGH (ref 7–23)
CALCIUM SERPL-MCNC: 9.4 MG/DL — SIGNIFICANT CHANGE UP (ref 8.4–10.5)
CHLORIDE SERPL-SCNC: 92 MMOL/L — LOW (ref 96–108)
CO2 SERPL-SCNC: 24 MMOL/L — SIGNIFICANT CHANGE UP (ref 22–31)
CREAT SERPL-MCNC: 1.18 MG/DL — SIGNIFICANT CHANGE UP (ref 0.5–1.3)
EGFR: 61 ML/MIN/1.73M2 — SIGNIFICANT CHANGE UP
EGFR: 61 ML/MIN/1.73M2 — SIGNIFICANT CHANGE UP
GLUCOSE SERPL-MCNC: 115 MG/DL — HIGH (ref 70–99)
HCT VFR BLD CALC: 40.1 % — SIGNIFICANT CHANGE UP (ref 39–50)
HGB BLD-MCNC: 13.8 G/DL — SIGNIFICANT CHANGE UP (ref 13–17)
MCHC RBC-ENTMCNC: 32.3 PG — SIGNIFICANT CHANGE UP (ref 27–34)
MCHC RBC-ENTMCNC: 34.4 G/DL — SIGNIFICANT CHANGE UP (ref 32–36)
MCV RBC AUTO: 93.9 FL — SIGNIFICANT CHANGE UP (ref 80–100)
NRBC # BLD AUTO: 0 K/UL — SIGNIFICANT CHANGE UP (ref 0–0)
NRBC # FLD: 0 K/UL — SIGNIFICANT CHANGE UP (ref 0–0)
NRBC BLD AUTO-RTO: 0 /100 WBCS — SIGNIFICANT CHANGE UP (ref 0–0)
PLATELET # BLD AUTO: 258 K/UL — SIGNIFICANT CHANGE UP (ref 150–400)
PMV BLD: 10.1 FL — SIGNIFICANT CHANGE UP (ref 7–13)
POTASSIUM SERPL-MCNC: 3.1 MMOL/L — LOW (ref 3.5–5.3)
POTASSIUM SERPL-SCNC: 3.1 MMOL/L — LOW (ref 3.5–5.3)
RBC # BLD: 4.27 M/UL — SIGNIFICANT CHANGE UP (ref 4.2–5.8)
RBC # FLD: 12.7 % — SIGNIFICANT CHANGE UP (ref 10.3–14.5)
SODIUM SERPL-SCNC: 133 MMOL/L — LOW (ref 135–145)
WBC # BLD: 6.42 K/UL — SIGNIFICANT CHANGE UP (ref 3.8–10.5)
WBC # FLD AUTO: 6.42 K/UL — SIGNIFICANT CHANGE UP (ref 3.8–10.5)

## 2025-06-19 PROCEDURE — 99285 EMERGENCY DEPT VISIT HI MDM: CPT

## 2025-06-19 PROCEDURE — 71045 X-RAY EXAM CHEST 1 VIEW: CPT

## 2025-06-19 PROCEDURE — 83605 ASSAY OF LACTIC ACID: CPT

## 2025-06-19 PROCEDURE — 93971 EXTREMITY STUDY: CPT

## 2025-06-19 PROCEDURE — 87086 URINE CULTURE/COLONY COUNT: CPT

## 2025-06-19 PROCEDURE — 84132 ASSAY OF SERUM POTASSIUM: CPT

## 2025-06-19 PROCEDURE — 80053 COMPREHEN METABOLIC PANEL: CPT

## 2025-06-19 PROCEDURE — 80048 BASIC METABOLIC PNL TOTAL CA: CPT

## 2025-06-19 PROCEDURE — 97161 PT EVAL LOW COMPLEX 20 MIN: CPT

## 2025-06-19 PROCEDURE — C8929: CPT

## 2025-06-19 PROCEDURE — 84295 ASSAY OF SERUM SODIUM: CPT

## 2025-06-19 PROCEDURE — 85014 HEMATOCRIT: CPT

## 2025-06-19 PROCEDURE — 36415 COLL VENOUS BLD VENIPUNCTURE: CPT

## 2025-06-19 PROCEDURE — 72125 CT NECK SPINE W/O DYE: CPT

## 2025-06-19 PROCEDURE — 84484 ASSAY OF TROPONIN QUANT: CPT

## 2025-06-19 PROCEDURE — 71275 CT ANGIOGRAPHY CHEST: CPT

## 2025-06-19 PROCEDURE — 85732 THROMBOPLASTIN TIME PARTIAL: CPT

## 2025-06-19 PROCEDURE — 85610 PROTHROMBIN TIME: CPT

## 2025-06-19 PROCEDURE — 82330 ASSAY OF CALCIUM: CPT

## 2025-06-19 PROCEDURE — 97530 THERAPEUTIC ACTIVITIES: CPT

## 2025-06-19 PROCEDURE — 93970 EXTREMITY STUDY: CPT

## 2025-06-19 PROCEDURE — 94640 AIRWAY INHALATION TREATMENT: CPT

## 2025-06-19 PROCEDURE — 86146 BETA-2 GLYCOPROTEIN ANTIBODY: CPT

## 2025-06-19 PROCEDURE — 99231 SBSQ HOSP IP/OBS SF/LOW 25: CPT

## 2025-06-19 PROCEDURE — 86147 CARDIOLIPIN ANTIBODY EA IG: CPT

## 2025-06-19 PROCEDURE — 83735 ASSAY OF MAGNESIUM: CPT

## 2025-06-19 PROCEDURE — 85025 COMPLETE CBC W/AUTO DIFF WBC: CPT

## 2025-06-19 PROCEDURE — 87637 SARSCOV2&INF A&B&RSV AMP PRB: CPT

## 2025-06-19 PROCEDURE — 85027 COMPLETE CBC AUTOMATED: CPT

## 2025-06-19 PROCEDURE — 70450 CT HEAD/BRAIN W/O DYE: CPT

## 2025-06-19 PROCEDURE — 83880 ASSAY OF NATRIURETIC PEPTIDE: CPT

## 2025-06-19 PROCEDURE — 82803 BLOOD GASES ANY COMBINATION: CPT

## 2025-06-19 PROCEDURE — 36600 WITHDRAWAL OF ARTERIAL BLOOD: CPT

## 2025-06-19 PROCEDURE — 97110 THERAPEUTIC EXERCISES: CPT

## 2025-06-19 PROCEDURE — 84100 ASSAY OF PHOSPHORUS: CPT

## 2025-06-19 PROCEDURE — 82947 ASSAY GLUCOSE BLOOD QUANT: CPT

## 2025-06-19 PROCEDURE — 97116 GAIT TRAINING THERAPY: CPT

## 2025-06-19 PROCEDURE — 85730 THROMBOPLASTIN TIME PARTIAL: CPT

## 2025-06-19 PROCEDURE — 85018 HEMOGLOBIN: CPT

## 2025-06-19 PROCEDURE — 82435 ASSAY OF BLOOD CHLORIDE: CPT

## 2025-06-19 PROCEDURE — 87040 BLOOD CULTURE FOR BACTERIA: CPT

## 2025-06-19 PROCEDURE — C8924: CPT

## 2025-06-19 PROCEDURE — 93005 ELECTROCARDIOGRAM TRACING: CPT

## 2025-06-19 PROCEDURE — 72170 X-RAY EXAM OF PELVIS: CPT

## 2025-06-19 PROCEDURE — 81001 URINALYSIS AUTO W/SCOPE: CPT

## 2025-06-19 PROCEDURE — 85613 RUSSELL VIPER VENOM DILUTED: CPT

## 2025-06-19 PROCEDURE — 92526 ORAL FUNCTION THERAPY: CPT

## 2025-06-19 PROCEDURE — 92610 EVALUATE SWALLOWING FUNCTION: CPT

## 2025-06-19 PROCEDURE — 99239 HOSP IP/OBS DSCHRG MGMT >30: CPT

## 2025-06-19 RX ORDER — GABAPENTIN 400 MG/1
1 CAPSULE ORAL
Qty: 0 | Refills: 0 | DISCHARGE

## 2025-06-19 RX ADMIN — Medication 4 MILLILITER(S): at 11:49

## 2025-06-19 RX ADMIN — Medication 4 MILLILITER(S): at 05:54

## 2025-06-19 RX ADMIN — IPRATROPIUM BROMIDE AND ALBUTEROL SULFATE 3 MILLILITER(S): .5; 2.5 SOLUTION RESPIRATORY (INHALATION) at 05:53

## 2025-06-19 RX ADMIN — Medication 20 MILLIGRAM(S): at 05:53

## 2025-06-19 RX ADMIN — MIRABEGRON 25 MILLIGRAM(S): 50 TABLET, FILM COATED, EXTENDED RELEASE ORAL at 11:48

## 2025-06-19 RX ADMIN — IPRATROPIUM BROMIDE AND ALBUTEROL SULFATE 3 MILLILITER(S): .5; 2.5 SOLUTION RESPIRATORY (INHALATION) at 11:48

## 2025-06-19 RX ADMIN — ESCITALOPRAM OXALATE 10 MILLIGRAM(S): 20 TABLET ORAL at 11:48

## 2025-06-19 RX ADMIN — DEXTROMETHORPHAN HBR, GUAIFENESIN 600 MILLIGRAM(S): 200 LIQUID ORAL at 05:53

## 2025-06-19 RX ADMIN — IPRATROPIUM BROMIDE AND ALBUTEROL SULFATE 3 MILLILITER(S): .5; 2.5 SOLUTION RESPIRATORY (INHALATION) at 02:00

## 2025-06-19 RX ADMIN — GABAPENTIN 300 MILLIGRAM(S): 400 CAPSULE ORAL at 11:48

## 2025-06-19 RX ADMIN — Medication 4 MILLILITER(S): at 01:59

## 2025-06-19 RX ADMIN — LINACLOTIDE 72 MICROGRAM(S): 290 CAPSULE, GELATIN COATED ORAL at 06:46

## 2025-06-19 RX ADMIN — ENOXAPARIN SODIUM 60 MILLIGRAM(S): 100 INJECTION SUBCUTANEOUS at 05:52

## 2025-06-19 RX ADMIN — METOPROLOL SUCCINATE 25 MILLIGRAM(S): 50 TABLET, EXTENDED RELEASE ORAL at 05:53

## 2025-06-19 RX ADMIN — Medication 100 MILLIGRAM(S): at 11:48

## 2025-06-19 RX ADMIN — Medication 25 MILLIGRAM(S): at 05:54

## 2025-06-19 NOTE — CHART NOTE - NSCHARTNOTEFT_GEN_A_CORE
INTERVAL HPI/OVERNIGHT EVENTS:  Patient S&E at bedside. No o/n events, patient resting comfortably. No complaints at this time. Patient denies fever, chills, dizziness, weakness, CP, palpitations, SOB, cough, N/V/D/C, dysuria, changes in bowel movements, LE edema.    VITAL SIGNS:  T(F): 97.8 (06-19-25 @ 12:30)  HR: 78 (06-19-25 @ 12:30)  BP: 118/78 (06-19-25 @ 12:30)  RR: 18 (06-19-25 @ 12:30)  SpO2: 95% (06-19-25 @ 12:30)  Wt(kg): --    PHYSICAL EXAM:    Constitutional: NAD  Eyes: EOMI, sclera non-icteric  Neck: supple, no masses, no JVD  Respiratory: CTA b/l, good air entry b/l  Cardiovascular: RRR, no M/R/G  Gastrointestinal: soft, NTND, no masses palpable, + BS, no hepatosplenomegaly  Extremities: no c/c/e  Neurological: AAOx3      MEDICATIONS  (STANDING):  albuterol/ipratropium for Nebulization 3 milliLiter(s) Nebulizer every 6 hours  allopurinol 100 milliGRAM(s) Oral daily  enoxaparin Injectable 60 milliGRAM(s) SubCutaneous every 12 hours  escitalopram 10 milliGRAM(s) Oral daily  gabapentin 300 milliGRAM(s) Oral daily  guaiFENesin  milliGRAM(s) Oral every 12 hours  linaclotide 72 MICROGram(s) Oral before breakfast  metoprolol succinate ER 25 milliGRAM(s) Oral daily  mirabegron ER 25 milliGRAM(s) Oral daily  rosuvastatin 10 milliGRAM(s) Oral at bedtime  sodium chloride 3%  Inhalation 4 milliLiter(s) Inhalation every 6 hours  spironolactone 25 milliGRAM(s) Oral daily  torsemide 20 milliGRAM(s) Oral daily    MEDICATIONS  (PRN):  acetylcysteine 10%  Inhalation 4 milliLiter(s) Inhalation every 4 hours PRN increased secretions  guaiFENesin Oral Liquid (Sugar-Free) 100 milliGRAM(s) Oral every 6 hours PRN Cough  sodium chloride 0.65% Nasal 1 Spray(s) Both Nostrils three times a day PRN Nasal Congestion      Allergies    No Known Allergies    Intolerances        LABS:                        13.8   6.42  )-----------( 258      ( 19 Jun 2025 07:18 )             40.1     06-19    133[L]  |  92[L]  |  29[H]  ----------------------------<  115[H]  3.1[L]   |  24  |  1.18    Ca    9.4      19 Jun 2025 07:18        Urinalysis Basic - ( 19 Jun 2025 07:18 )    Color: x / Appearance: x / SG: x / pH: x  Gluc: 115 mg/dL / Ketone: x  / Bili: x / Urobili: x   Blood: x / Protein: x / Nitrite: x   Leuk Esterase: x / RBC: x / WBC x   Sq Epi: x / Non Sq Epi: x / Bacteria: x        RADIOLOGY & ADDITIONAL TESTS:  IMPRESSION:    Bilateral pulmonary thromboemboli, most proximally in the right   interlobar pulmonary artery. No evidenceof right heart strain, although   recommend correlation with echocardiogram if clinically appropriate.   Findings were discussed by Dr. Dailey with JEREMIE Vergara NP on   6/2/2025 11:46 PM with read back confirmation.    Bibasilar atelectasis. Cannot exclude concurrent infarct.    Indeterminate left upper lobe 1.7 cm nodule. Recommend 8 week follow-up.     Assessment:   85 y/o M with past medical history of HTN, HLD, CAD with stents on DAPT, CHF presenting from the [facility] with falls, on workup was found to have bilateral pulmonary thromboemboli and a 1.7CM JS Lung nodule which is indeterminant. Differential diagnoses includes granuloma, mucus plug versus a primary lung neoplasm. No prior images available for comparison. Patient is an appropriate candidate for CCD- Lung Nodule Program.       PLAN  -- Per primary team, patient to be discharged to [facility] today. Will send patient information to Lung Nodule Program.     Sarah Earl PA-C  [facility]   Available on TEAMS INTERVAL HPI/OVERNIGHT EVENTS:  Patient S&E at bedside. No o/n events, patient resting comfortably. No complaints at this time. Patient denies fever, chills, dizziness, weakness, CP, palpitations, SOB, cough, N/V/D/C, dysuria, changes in bowel movements, LE edema.    VITAL SIGNS:  T(F): 97.8 (06-19-25 @ 12:30)  HR: 78 (06-19-25 @ 12:30)  BP: 118/78 (06-19-25 @ 12:30)  RR: 18 (06-19-25 @ 12:30)  SpO2: 95% (06-19-25 @ 12:30)  Wt(kg): --    PHYSICAL EXAM:    Constitutional: NAD  Eyes: EOMI, sclera non-icteric  Neck: supple, no masses, no JVD  Respiratory: CTA b/l, good air entry b/l  Cardiovascular: RRR, no M/R/G  Gastrointestinal: soft, NTND, no masses palpable, + BS, no hepatosplenomegaly  Extremities: no c/c/e  Neurological: AAOx3      MEDICATIONS  (STANDING):  albuterol/ipratropium for Nebulization 3 milliLiter(s) Nebulizer every 6 hours  allopurinol 100 milliGRAM(s) Oral daily  enoxaparin Injectable 60 milliGRAM(s) SubCutaneous every 12 hours  escitalopram 10 milliGRAM(s) Oral daily  gabapentin 300 milliGRAM(s) Oral daily  guaiFENesin  milliGRAM(s) Oral every 12 hours  linaclotide 72 MICROGram(s) Oral before breakfast  metoprolol succinate ER 25 milliGRAM(s) Oral daily  mirabegron ER 25 milliGRAM(s) Oral daily  rosuvastatin 10 milliGRAM(s) Oral at bedtime  sodium chloride 3%  Inhalation 4 milliLiter(s) Inhalation every 6 hours  spironolactone 25 milliGRAM(s) Oral daily  torsemide 20 milliGRAM(s) Oral daily    MEDICATIONS  (PRN):  acetylcysteine 10%  Inhalation 4 milliLiter(s) Inhalation every 4 hours PRN increased secretions  guaiFENesin Oral Liquid (Sugar-Free) 100 milliGRAM(s) Oral every 6 hours PRN Cough  sodium chloride 0.65% Nasal 1 Spray(s) Both Nostrils three times a day PRN Nasal Congestion      Allergies    No Known Allergies    Intolerances        LABS:                        13.8   6.42  )-----------( 258      ( 19 Jun 2025 07:18 )             40.1     06-19    133[L]  |  92[L]  |  29[H]  ----------------------------<  115[H]  3.1[L]   |  24  |  1.18    Ca    9.4      19 Jun 2025 07:18        Urinalysis Basic - ( 19 Jun 2025 07:18 )    Color: x / Appearance: x / SG: x / pH: x  Gluc: 115 mg/dL / Ketone: x  / Bili: x / Urobili: x   Blood: x / Protein: x / Nitrite: x   Leuk Esterase: x / RBC: x / WBC x   Sq Epi: x / Non Sq Epi: x / Bacteria: x        RADIOLOGY & ADDITIONAL TESTS:  IMPRESSION:    Bilateral pulmonary thromboemboli, most proximally in the right   interlobar pulmonary artery. No evidenceof right heart strain, although   recommend correlation with echocardiogram if clinically appropriate.   Findings were discussed by Dr. Dailey with JEREMIE Vergara NP on   6/2/2025 11:46 PM with read back confirmation.    Bibasilar atelectasis. Cannot exclude concurrent infarct.    Indeterminate left upper lobe 1.7 cm nodule. Recommend 8 week follow-up.     Assessment:   85 y/o M with past medical history of HTN, HLD, CAD with stents on DAPT, CHF presenting from the [facility] with falls, on workup was found to have bilateral pulmonary thromboemboli and a 1.7CM JS Lung nodule which is indeterminant. Differential diagnoses includes granuloma, mucus plug versus a primary lung neoplasm. No prior images available for comparison. Patient is an appropriate candidate for CCD- Lung Nodule Program.       PLAN  -- Per primary team, patient to be discharged to [facility] today. Will send patient information to Lung Nodule Program.     Sarah Earl PA-C  [facility]   Available on TEAMS    Time-based billing (NON-critical care).     25 minutes spent on total encounter. The necessity of the time spent during the encounter on this date of service was due to:     -- Reviewing, and interpreting labs, testing, and imaging.  -- Independently obtaining a review of systems and performing a physical exam  -- Reviewing consultant documentation/recommendations in addition to discussing plan of care with consultants.  -- Counselling and educating patient and family regarding interpretation of aforementioned items and plan of care.

## 2025-06-19 NOTE — DISCHARGE NOTE NURSING/CASE MANAGEMENT/SOCIAL WORK - FINANCIAL ASSISTANCE
[facility] provides services at a reduced cost to those who are determined to be eligible through [facility]’s financial assistance program. Information regarding [facility]’s financial assistance program can be found by going to [email or web address] or by calling [phone].

## 2025-06-19 NOTE — CHART NOTE - NSCHARTNOTESELECT_GEN_ALL_CORE
[facility]- Initial Consult- Physician Assistant/Event Note
[facility]-Physician Assistant/Event Note
Neurosurgery/Event Note
Event Note
Event Note
Neurosurgery CT

## 2025-06-19 NOTE — DISCHARGE NOTE NURSING/CASE MANAGEMENT/SOCIAL WORK - NSTRANSFERBELONGINGSDISPO_GEN_A_NUR
"Completed form(s) and placed in \"To Do\" bin in peds pod.   Electronically signed by Ivet Kapoor MD.      "
Form returned in pre addressed envelope.     Rupert Smith, Pediatric     
Reason for Call:  Form, our goal is to have forms completed with 72 hours, however, some forms may require a visit or additional information.    Type of letter, form or note:  medical    Who is the form from?: [facility] (if other please explain)    Where did the form come from: form was faxed in    What clinic location was the form placed at?: [facility] - [phone]    Where the form was placed: [facility]    What number is listed as a contact on the form?: [phone]       Additional comments: please review form,complete and mail back in envelope provided    Call taken on 7/20/2017 at 11:56 AM by Josy Kenny        
with patient

## 2025-06-24 PROBLEM — I25.10 ATHEROSCLEROTIC HEART DISEASE OF NATIVE CORONARY ARTERY WITHOUT ANGINA PECTORIS: Chronic | Status: ACTIVE | Noted: 2025-05-31

## 2025-06-24 PROBLEM — I50.32 CHRONIC DIASTOLIC (CONGESTIVE) HEART FAILURE: Chronic | Status: ACTIVE | Noted: 2025-05-31

## 2025-06-26 ENCOUNTER — APPOINTMENT (OUTPATIENT)
Dept: CARDIOLOGY | Facility: CLINIC | Age: 85
End: 2025-06-26